# Patient Record
Sex: MALE | Race: WHITE | Employment: OTHER
[De-identification: names, ages, dates, MRNs, and addresses within clinical notes are randomized per-mention and may not be internally consistent; named-entity substitution may affect disease eponyms.]

---

## 2024-04-09 ENCOUNTER — APPOINTMENT (OUTPATIENT)
Facility: HOSPITAL | Age: 69
DRG: 871 | End: 2024-04-09
Payer: MEDICARE

## 2024-04-09 ENCOUNTER — HOSPITAL ENCOUNTER (INPATIENT)
Facility: HOSPITAL | Age: 69
LOS: 6 days | Discharge: NURSING, REHAB OR LONG-TERM CARE | DRG: 871 | End: 2024-04-15
Attending: EMERGENCY MEDICINE | Admitting: HOSPITALIST
Payer: MEDICARE

## 2024-04-09 ENCOUNTER — APPOINTMENT (OUTPATIENT)
Facility: HOSPITAL | Age: 69
DRG: 871 | End: 2024-04-09
Attending: EMERGENCY MEDICINE
Payer: MEDICARE

## 2024-04-09 DIAGNOSIS — J18.9 HAP (HOSPITAL-ACQUIRED PNEUMONIA): Primary | ICD-10-CM
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
## 2024-04-09 DIAGNOSIS — Z86.79 HISTORY OF INTRACRANIAL HEMORRHAGE: ICD-10-CM

## 2024-04-09 PROBLEM — R41.82 ALTERED MENTAL STATUS: Status: ACTIVE | Noted: 2024-04-09

## 2024-04-09 LAB
ALBUMIN SERPL-MCNC: 2.4 G/DL (ref 3.5–5)
ALBUMIN/GLOB SERPL: 0.5 (ref 1.1–2.2)
ALP SERPL-CCNC: 227 U/L (ref 45–117)
ALT SERPL-CCNC: 98 U/L (ref 12–78)
AMMONIA PLAS-SCNC: 48 UMOL/L
ANION GAP SERPL CALC-SCNC: 6 MMOL/L (ref 5–15)
APPEARANCE UR: CLEAR
AST SERPL W P-5'-P-CCNC: 73 U/L (ref 15–37)
BACTERIA URNS QL MICRO: ABNORMAL /HPF
BASE EXCESS BLD CALC-SCNC: 0.1 MMOL/L
BASOPHILS # BLD: 0.2 K/UL (ref 0–0.1)
BASOPHILS NFR BLD: 1 % (ref 0–1)
BILIRUB SERPL-MCNC: 1.8 MG/DL (ref 0.2–1)
BILIRUB UR QL: NEGATIVE
BUN SERPL-MCNC: 39 MG/DL (ref 6–20)
BUN/CREAT SERPL: 28 (ref 12–20)
CA-I BLD-MCNC: 1.26 MMOL/L (ref 1.12–1.32)
CA-I BLD-MCNC: 9.2 MG/DL (ref 8.5–10.1)
CHLORIDE BLD-SCNC: 97 MMOL/L (ref 98–107)
CHLORIDE SERPL-SCNC: 100 MMOL/L (ref 97–108)
CO2 BLD-SCNC: 24 MMOL/L
CO2 SERPL-SCNC: 24 MMOL/L (ref 21–32)
COLOR UR: ABNORMAL
CREAT SERPL-MCNC: 1.37 MG/DL (ref 0.7–1.3)
CREAT UR-MCNC: 1.05 MG/DL (ref 0.6–1.3)
DIFFERENTIAL METHOD BLD: ABNORMAL
EKG ATRIAL RATE: 97 BPM
EKG DIAGNOSIS: NORMAL
EKG P AXIS: 60 DEGREES
EKG P-R INTERVAL: 262 MS
EKG Q-T INTERVAL: 330 MS
EKG QRS DURATION: 98 MS
EKG QTC CALCULATION (BAZETT): 419 MS
EKG R AXIS: 14 DEGREES
EKG T AXIS: 42 DEGREES
EKG VENTRICULAR RATE: 97 BPM
EOSINOPHIL # BLD: 0 K/UL (ref 0–0.4)
EOSINOPHIL NFR BLD: 0 % (ref 0–7)
EPITH CASTS URNS QL MICRO: ABNORMAL /LPF
ERYTHROCYTE [DISTWIDTH] IN BLOOD BY AUTOMATED COUNT: 14 % (ref 11.5–14.5)
FLUAV AG NPH QL IA: NEGATIVE
FLUBV AG NOSE QL IA: NEGATIVE
GLOBULIN SER CALC-MCNC: 4.7 G/DL (ref 2–4)
GLUCOSE BLD STRIP.AUTO-MCNC: 193 MG/DL (ref 65–100)
GLUCOSE BLD STRIP.AUTO-MCNC: 288 MG/DL (ref 65–100)
GLUCOSE SERPL-MCNC: 280 MG/DL (ref 65–100)
GLUCOSE UR STRIP.AUTO-MCNC: >500 MG/DL
HCO3 BLD-SCNC: 24.3 MMOL/L (ref 19–28)
HCT VFR BLD AUTO: 33.6 % (ref 36.6–50.3)
HGB BLD-MCNC: 11.5 G/DL (ref 12.1–17)
HGB UR QL STRIP: ABNORMAL
IMM GRANULOCYTES # BLD AUTO: 0 K/UL
IMM GRANULOCYTES NFR BLD AUTO: 0 %
KETONES UR QL STRIP.AUTO: NEGATIVE MG/DL
LACTATE BLD-SCNC: 1.49 MMOL/L (ref 0.4–2)
LACTATE BLD-SCNC: 2.08 MMOL/L (ref 0.4–2)
LACTATE BLD-SCNC: 2.99 MMOL/L (ref 0.4–2)
LEUKOCYTE ESTERASE UR QL STRIP.AUTO: ABNORMAL
LYMPHOCYTES # BLD: 1.4 K/UL (ref 0.8–3.5)
LYMPHOCYTES NFR BLD: 7 % (ref 12–49)
MCH RBC QN AUTO: 33 PG (ref 26–34)
MCHC RBC AUTO-ENTMCNC: 34.2 G/DL (ref 30–36.5)
MCV RBC AUTO: 96.6 FL (ref 80–99)
METAMYELOCYTES NFR BLD MANUAL: 1 %
MONOCYTES # BLD: 1.4 K/UL (ref 0–1)
MONOCYTES NFR BLD: 7 % (ref 5–13)
NEUTS BAND NFR BLD MANUAL: 7 % (ref 0–6)
NEUTS SEG # BLD: 16.5 K/UL (ref 1.8–8)
NEUTS SEG NFR BLD: 77 % (ref 32–75)
NITRITE UR QL STRIP.AUTO: POSITIVE
NRBC # BLD: 0 K/UL (ref 0–0.01)
NRBC BLD-RTO: 0 PER 100 WBC
PCO2 BLD: 37 MMHG (ref 35–45)
PERFORMED BY:: ABNORMAL
PERFORMED BY:: NORMAL
PH BLD: 7.42 (ref 7.35–7.45)
PH UR STRIP: 5 (ref 5–8)
PLATELET # BLD AUTO: 73 K/UL (ref 150–400)
PLATELET COMMENT: ABNORMAL
PMV BLD AUTO: 12.6 FL (ref 8.9–12.9)
PO2 BLD: 38 MMHG (ref 75–100)
POTASSIUM BLD-SCNC: 4.2 MMOL/L (ref 3.5–5.5)
POTASSIUM SERPL-SCNC: 4 MMOL/L (ref 3.5–5.1)
PROT SERPL-MCNC: 7.1 G/DL (ref 6.4–8.2)
PROT UR STRIP-MCNC: NEGATIVE MG/DL
RBC # BLD AUTO: 3.48 M/UL (ref 4.1–5.7)
RBC #/AREA URNS HPF: ABNORMAL /HPF (ref 0–5)
RBC MORPH BLD: ABNORMAL
SAO2 % BLD: 74 %
SARS-COV-2 RDRP RESP QL NAA+PROBE: NOT DETECTED
SODIUM BLD-SCNC: 132 MMOL/L (ref 136–145)
SODIUM SERPL-SCNC: 130 MMOL/L (ref 136–145)
SP GR UR REFRACTOMETRY: 1.02 (ref 1–1.03)
SPECIMEN SITE: ABNORMAL
TROPONIN I SERPL HS-MCNC: 5 NG/L (ref 0–76)
URINE CULTURE IF INDICATED: ABNORMAL
UROBILINOGEN UR QL STRIP.AUTO: 2 EU/DL (ref 0.1–1)
WBC # BLD AUTO: 19.7 K/UL (ref 4.1–11.1)
WBC URNS QL MICRO: ABNORMAL /HPF (ref 0–4)

## 2024-04-09 PROCEDURE — 87635 SARS-COV-2 COVID-19 AMP PRB: CPT

## 2024-04-09 PROCEDURE — 80053 COMPREHEN METABOLIC PANEL: CPT

## 2024-04-09 PROCEDURE — 6360000002 HC RX W HCPCS: Performed by: EMERGENCY MEDICINE

## 2024-04-09 PROCEDURE — 3E0G76Z INTRODUCTION OF NUTRITIONAL SUBSTANCE INTO UPPER GI, VIA NATURAL OR ARTIFICIAL OPENING: ICD-10-PCS | Performed by: INTERNAL MEDICINE

## 2024-04-09 PROCEDURE — 81001 URINALYSIS AUTO W/SCOPE: CPT

## 2024-04-09 PROCEDURE — 82140 ASSAY OF AMMONIA: CPT

## 2024-04-09 PROCEDURE — 96375 TX/PRO/DX INJ NEW DRUG ADDON: CPT

## 2024-04-09 PROCEDURE — 87086 URINE CULTURE/COLONY COUNT: CPT

## 2024-04-09 PROCEDURE — 87186 SC STD MICRODIL/AGAR DIL: CPT

## 2024-04-09 PROCEDURE — 96366 THER/PROPH/DIAG IV INF ADDON: CPT

## 2024-04-09 PROCEDURE — 82962 GLUCOSE BLOOD TEST: CPT

## 2024-04-09 PROCEDURE — 70450 CT HEAD/BRAIN W/O DYE: CPT

## 2024-04-09 PROCEDURE — 71045 X-RAY EXAM CHEST 1 VIEW: CPT

## 2024-04-09 PROCEDURE — 2580000003 HC RX 258: Performed by: EMERGENCY MEDICINE

## 2024-04-09 PROCEDURE — 99285 EMERGENCY DEPT VISIT HI MDM: CPT

## 2024-04-09 PROCEDURE — 84295 ASSAY OF SERUM SODIUM: CPT

## 2024-04-09 PROCEDURE — 84484 ASSAY OF TROPONIN QUANT: CPT

## 2024-04-09 PROCEDURE — 6360000002 HC RX W HCPCS: Performed by: HOSPITALIST

## 2024-04-09 PROCEDURE — 83605 ASSAY OF LACTIC ACID: CPT

## 2024-04-09 PROCEDURE — 96365 THER/PROPH/DIAG IV INF INIT: CPT

## 2024-04-09 PROCEDURE — 87804 INFLUENZA ASSAY W/OPTIC: CPT

## 2024-04-09 PROCEDURE — 82803 BLOOD GASES ANY COMBINATION: CPT

## 2024-04-09 PROCEDURE — 2000000000 HC ICU R&B

## 2024-04-09 PROCEDURE — 36415 COLL VENOUS BLD VENIPUNCTURE: CPT

## 2024-04-09 PROCEDURE — 85025 COMPLETE CBC W/AUTO DIFF WBC: CPT

## 2024-04-09 PROCEDURE — 2580000003 HC RX 258: Performed by: HOSPITALIST

## 2024-04-09 PROCEDURE — 93005 ELECTROCARDIOGRAM TRACING: CPT | Performed by: EMERGENCY MEDICINE

## 2024-04-09 PROCEDURE — 6370000000 HC RX 637 (ALT 250 FOR IP): Performed by: HOSPITALIST

## 2024-04-09 PROCEDURE — 96367 TX/PROPH/DG ADDL SEQ IV INF: CPT

## 2024-04-09 PROCEDURE — 87040 BLOOD CULTURE FOR BACTERIA: CPT

## 2024-04-09 PROCEDURE — 82330 ASSAY OF CALCIUM: CPT

## 2024-04-09 PROCEDURE — 82947 ASSAY GLUCOSE BLOOD QUANT: CPT

## 2024-04-09 PROCEDURE — 87088 URINE BACTERIA CULTURE: CPT

## 2024-04-09 PROCEDURE — 84132 ASSAY OF SERUM POTASSIUM: CPT

## 2024-04-09 RX ORDER — ALBUTEROL SULFATE 2.5 MG/3ML
2.5 SOLUTION RESPIRATORY (INHALATION) EVERY 8 HOURS
COMMUNITY
Start: 2024-04-03 | End: 2024-05-03

## 2024-04-09 RX ORDER — ALBUTEROL SULFATE 90 UG/1
2 AEROSOL, METERED RESPIRATORY (INHALATION) EVERY 6 HOURS PRN
Status: DISCONTINUED | OUTPATIENT
Start: 2024-04-09 | End: 2024-04-15 | Stop reason: HOSPADM

## 2024-04-09 RX ORDER — AMANTADINE HYDROCHLORIDE 100 MG/1
100 CAPSULE, GELATIN COATED ORAL DAILY
Status: DISCONTINUED | OUTPATIENT
Start: 2024-04-10 | End: 2024-04-13

## 2024-04-09 RX ORDER — BISACODYL 10 MG
10 SUPPOSITORY, RECTAL RECTAL DAILY PRN
Status: DISCONTINUED | OUTPATIENT
Start: 2024-04-09 | End: 2024-04-15 | Stop reason: HOSPADM

## 2024-04-09 RX ORDER — AMANTADINE HYDROCHLORIDE 100 MG/1
100 CAPSULE, GELATIN COATED ORAL DAILY
Status: ON HOLD | COMMUNITY
End: 2024-04-13 | Stop reason: HOSPADM

## 2024-04-09 RX ORDER — POTASSIUM CHLORIDE 7.45 MG/ML
10 INJECTION INTRAVENOUS PRN
Status: DISCONTINUED | OUTPATIENT
Start: 2024-04-09 | End: 2024-04-15 | Stop reason: HOSPADM

## 2024-04-09 RX ORDER — INSULIN LISPRO 100 [IU]/ML
16 INJECTION, SOLUTION INTRAVENOUS; SUBCUTANEOUS EVERY 4 HOURS
COMMUNITY
Start: 2024-04-03 | End: 2025-04-03

## 2024-04-09 RX ORDER — INSULIN GLARGINE 100 [IU]/ML
34 INJECTION, SOLUTION SUBCUTANEOUS NIGHTLY
COMMUNITY
Start: 2024-04-03 | End: 2024-05-03

## 2024-04-09 RX ORDER — PROPRANOLOL HYDROCHLORIDE 10 MG/1
10 TABLET ORAL EVERY 8 HOURS
Status: ON HOLD | COMMUNITY
Start: 2024-04-03 | End: 2024-04-14 | Stop reason: HOSPADM

## 2024-04-09 RX ORDER — DORZOLAMIDE HCL 20 MG/ML
1 SOLUTION/ DROPS OPHTHALMIC 3 TIMES DAILY
Status: DISCONTINUED | OUTPATIENT
Start: 2024-04-09 | End: 2024-04-10

## 2024-04-09 RX ORDER — GLUCAGON 1 MG/ML
1 KIT INJECTION PRN
Status: DISCONTINUED | OUTPATIENT
Start: 2024-04-09 | End: 2024-04-15 | Stop reason: HOSPADM

## 2024-04-09 RX ORDER — INSULIN LISPRO 100 [IU]/ML
0-8 INJECTION, SOLUTION INTRAVENOUS; SUBCUTANEOUS EVERY 4 HOURS
Status: DISCONTINUED | OUTPATIENT
Start: 2024-04-09 | End: 2024-04-11

## 2024-04-09 RX ORDER — GUAIFENESIN 200 MG/1
200 TABLET ORAL EVERY 6 HOURS PRN
Status: ON HOLD | COMMUNITY
Start: 2024-04-03 | End: 2024-04-12 | Stop reason: HOSPADM

## 2024-04-09 RX ORDER — METHYLPHENIDATE HYDROCHLORIDE 10 MG/1
10 TABLET ORAL 2 TIMES DAILY
Status: ON HOLD | COMMUNITY
End: 2024-04-13 | Stop reason: HOSPADM

## 2024-04-09 RX ORDER — SENNOSIDES A AND B 8.6 MG/1
2 TABLET, FILM COATED ORAL NIGHTLY
Status: ON HOLD | COMMUNITY
End: 2024-04-12 | Stop reason: HOSPADM

## 2024-04-09 RX ORDER — HYDROXYZINE HYDROCHLORIDE 25 MG/1
25 TABLET, FILM COATED ORAL EVERY 6 HOURS PRN
Status: DISCONTINUED | OUTPATIENT
Start: 2024-04-09 | End: 2024-04-15 | Stop reason: HOSPADM

## 2024-04-09 RX ORDER — DORZOLAMIDE HYDROCHLORIDE AND TIMOLOL MALEATE 20; 5 MG/ML; MG/ML
1 SOLUTION/ DROPS OPHTHALMIC 2 TIMES DAILY
COMMUNITY

## 2024-04-09 RX ORDER — INSULIN GLARGINE 100 [IU]/ML
34 INJECTION, SOLUTION SUBCUTANEOUS NIGHTLY
Status: DISCONTINUED | OUTPATIENT
Start: 2024-04-09 | End: 2024-04-15 | Stop reason: HOSPADM

## 2024-04-09 RX ORDER — CALCIUM CARBONATE 500(1250)
500 TABLET ORAL 4 TIMES DAILY
COMMUNITY

## 2024-04-09 RX ORDER — AMLODIPINE BESYLATE 10 MG/1
10 TABLET ORAL DAILY
Status: ON HOLD | COMMUNITY
Start: 2024-04-04 | End: 2024-04-12 | Stop reason: HOSPADM

## 2024-04-09 RX ORDER — POLYETHYLENE GLYCOL 3350 17 G/17G
17 POWDER, FOR SOLUTION ORAL DAILY PRN
Status: DISCONTINUED | OUTPATIENT
Start: 2024-04-09 | End: 2024-04-15 | Stop reason: HOSPADM

## 2024-04-09 RX ORDER — LANOLIN ALCOHOL/MO/W.PET/CERES
3 CREAM (GRAM) TOPICAL NIGHTLY PRN
COMMUNITY

## 2024-04-09 RX ORDER — BENZOCAINE/MENTHOL 6 MG-10 MG
LOZENGE MUCOUS MEMBRANE 2 TIMES DAILY
Status: ON HOLD | COMMUNITY
End: 2024-04-12 | Stop reason: HOSPADM

## 2024-04-09 RX ORDER — 0.9 % SODIUM CHLORIDE 0.9 %
1000 INTRAVENOUS SOLUTION INTRAVENOUS ONCE
Status: COMPLETED | OUTPATIENT
Start: 2024-04-09 | End: 2024-04-09

## 2024-04-09 RX ORDER — LACTULOSE 10 G/15ML
20 SOLUTION ORAL DAILY
Status: DISCONTINUED | OUTPATIENT
Start: 2024-04-10 | End: 2024-04-15 | Stop reason: HOSPADM

## 2024-04-09 RX ORDER — SODIUM CHLORIDE 0.9 % (FLUSH) 0.9 %
5-40 SYRINGE (ML) INJECTION PRN
Status: DISCONTINUED | OUTPATIENT
Start: 2024-04-09 | End: 2024-04-15 | Stop reason: HOSPADM

## 2024-04-09 RX ORDER — ONDANSETRON 4 MG/1
4 TABLET, ORALLY DISINTEGRATING ORAL EVERY 6 HOURS
COMMUNITY

## 2024-04-09 RX ORDER — ONDANSETRON 2 MG/ML
4 INJECTION INTRAMUSCULAR; INTRAVENOUS EVERY 6 HOURS PRN
Status: DISCONTINUED | OUTPATIENT
Start: 2024-04-09 | End: 2024-04-15 | Stop reason: HOSPADM

## 2024-04-09 RX ORDER — SIMETHICONE 80 MG
80 TABLET,CHEWABLE ORAL EVERY 6 HOURS PRN
COMMUNITY

## 2024-04-09 RX ORDER — DORZOLAMIDE HYDROCHLORIDE AND TIMOLOL MALEATE 20; 5 MG/ML; MG/ML
1 SOLUTION/ DROPS OPHTHALMIC 2 TIMES DAILY
Status: DISCONTINUED | OUTPATIENT
Start: 2024-04-09 | End: 2024-04-09

## 2024-04-09 RX ORDER — SODIUM CHLORIDE 9 MG/ML
INJECTION, SOLUTION INTRAVENOUS PRN
Status: DISCONTINUED | OUTPATIENT
Start: 2024-04-09 | End: 2024-04-10

## 2024-04-09 RX ORDER — POTASSIUM CHLORIDE 29.8 MG/ML
20 INJECTION INTRAVENOUS PRN
Status: DISCONTINUED | OUTPATIENT
Start: 2024-04-09 | End: 2024-04-15 | Stop reason: HOSPADM

## 2024-04-09 RX ORDER — LATANOPROST 50 UG/ML
1 SOLUTION/ DROPS OPHTHALMIC NIGHTLY
COMMUNITY

## 2024-04-09 RX ORDER — SODIUM CHLORIDE 0.9 % (FLUSH) 0.9 %
5-40 SYRINGE (ML) INJECTION EVERY 12 HOURS SCHEDULED
Status: DISCONTINUED | OUTPATIENT
Start: 2024-04-09 | End: 2024-04-15 | Stop reason: HOSPADM

## 2024-04-09 RX ORDER — METRONIDAZOLE 500 MG/1
500 TABLET ORAL EVERY 12 HOURS
Status: ON HOLD | COMMUNITY
End: 2024-04-12 | Stop reason: HOSPADM

## 2024-04-09 RX ORDER — LEVETIRACETAM 500 MG/5ML
1500 INJECTION, SOLUTION, CONCENTRATE INTRAVENOUS
Status: COMPLETED | OUTPATIENT
Start: 2024-04-09 | End: 2024-04-09

## 2024-04-09 RX ORDER — SIMETHICONE 80 MG
80 TABLET,CHEWABLE ORAL EVERY 6 HOURS PRN
Status: DISCONTINUED | OUTPATIENT
Start: 2024-04-09 | End: 2024-04-15 | Stop reason: HOSPADM

## 2024-04-09 RX ORDER — LANOLIN ALCOHOL/MO/W.PET/CERES
3 CREAM (GRAM) TOPICAL NIGHTLY PRN
Status: DISCONTINUED | OUTPATIENT
Start: 2024-04-09 | End: 2024-04-15 | Stop reason: HOSPADM

## 2024-04-09 RX ORDER — ASPIRIN 81 MG/1
81 TABLET, CHEWABLE ORAL DAILY
Status: ON HOLD | COMMUNITY
Start: 2024-04-04 | End: 2024-04-12

## 2024-04-09 RX ORDER — POLYETHYLENE GLYCOL 3350 17 G/17G
17 POWDER, FOR SOLUTION ORAL DAILY PRN
Status: ON HOLD | COMMUNITY
End: 2024-04-12 | Stop reason: HOSPADM

## 2024-04-09 RX ORDER — MAGNESIUM SULFATE IN WATER 40 MG/ML
2000 INJECTION, SOLUTION INTRAVENOUS PRN
Status: DISCONTINUED | OUTPATIENT
Start: 2024-04-09 | End: 2024-04-15 | Stop reason: HOSPADM

## 2024-04-09 RX ORDER — BISACODYL 10 MG
10 SUPPOSITORY, RECTAL RECTAL DAILY PRN
COMMUNITY

## 2024-04-09 RX ORDER — SENNOSIDES A AND B 8.6 MG/1
2 TABLET, FILM COATED ORAL NIGHTLY
Status: DISCONTINUED | OUTPATIENT
Start: 2024-04-09 | End: 2024-04-13

## 2024-04-09 RX ORDER — HYDROXYZINE HYDROCHLORIDE 25 MG/1
25 TABLET, FILM COATED ORAL EVERY 6 HOURS PRN
COMMUNITY

## 2024-04-09 RX ORDER — METHYLPHENIDATE HYDROCHLORIDE 5 MG/1
10 TABLET ORAL 2 TIMES DAILY
Status: DISCONTINUED | OUTPATIENT
Start: 2024-04-10 | End: 2024-04-13

## 2024-04-09 RX ORDER — NALOXONE HYDROCHLORIDE 0.4 MG/ML
0.4 INJECTION, SOLUTION INTRAMUSCULAR; INTRAVENOUS; SUBCUTANEOUS PRN
COMMUNITY

## 2024-04-09 RX ORDER — LATANOPROST 50 UG/ML
1 SOLUTION/ DROPS OPHTHALMIC NIGHTLY
Status: DISCONTINUED | OUTPATIENT
Start: 2024-04-09 | End: 2024-04-15 | Stop reason: HOSPADM

## 2024-04-09 RX ORDER — ACETAMINOPHEN 325 MG/1
650 TABLET ORAL EVERY 4 HOURS PRN
Status: ON HOLD | COMMUNITY
End: 2024-04-12

## 2024-04-09 RX ORDER — DEXTROSE MONOHYDRATE 100 MG/ML
INJECTION, SOLUTION INTRAVENOUS CONTINUOUS PRN
Status: DISCONTINUED | OUTPATIENT
Start: 2024-04-09 | End: 2024-04-15 | Stop reason: HOSPADM

## 2024-04-09 RX ORDER — TIMOLOL MALEATE 5 MG/ML
1 SOLUTION/ DROPS OPHTHALMIC 2 TIMES DAILY
Status: DISCONTINUED | OUTPATIENT
Start: 2024-04-09 | End: 2024-04-10

## 2024-04-09 RX ORDER — ACETAMINOPHEN 650 MG/1
650 SUPPOSITORY RECTAL EVERY 6 HOURS PRN
Status: DISCONTINUED | OUTPATIENT
Start: 2024-04-09 | End: 2024-04-12

## 2024-04-09 RX ORDER — LISINOPRIL 10 MG/1
10 TABLET ORAL DAILY
COMMUNITY
Start: 2024-02-03 | End: 2024-04-09 | Stop reason: ALTCHOICE

## 2024-04-09 RX ORDER — ONDANSETRON 4 MG/1
4 TABLET, ORALLY DISINTEGRATING ORAL EVERY 8 HOURS PRN
Status: DISCONTINUED | OUTPATIENT
Start: 2024-04-09 | End: 2024-04-15 | Stop reason: HOSPADM

## 2024-04-09 RX ORDER — ALBUTEROL SULFATE 90 UG/1
2 AEROSOL, METERED RESPIRATORY (INHALATION) EVERY 6 HOURS PRN
COMMUNITY

## 2024-04-09 RX ORDER — ACETAMINOPHEN 325 MG/1
650 TABLET ORAL EVERY 6 HOURS PRN
Status: DISCONTINUED | OUTPATIENT
Start: 2024-04-09 | End: 2024-04-12

## 2024-04-09 RX ORDER — HYDRALAZINE HYDROCHLORIDE 25 MG/1
25 TABLET, FILM COATED ORAL EVERY 6 HOURS PRN
COMMUNITY

## 2024-04-09 RX ADMIN — PIPERACILLIN AND TAZOBACTAM 4500 MG: 4; .5 INJECTION, POWDER, LYOPHILIZED, FOR SOLUTION INTRAVENOUS at 13:22

## 2024-04-09 RX ADMIN — SODIUM CHLORIDE, PRESERVATIVE FREE 10 ML: 5 INJECTION INTRAVENOUS at 23:19

## 2024-04-09 RX ADMIN — SODIUM CHLORIDE 1000 ML: 9 INJECTION, SOLUTION INTRAVENOUS at 13:32

## 2024-04-09 RX ADMIN — SODIUM CHLORIDE 1000 ML: 9 INJECTION, SOLUTION INTRAVENOUS at 16:29

## 2024-04-09 RX ADMIN — VANCOMYCIN HYDROCHLORIDE 1500 MG: 750 INJECTION, POWDER, LYOPHILIZED, FOR SOLUTION INTRAVENOUS at 14:03

## 2024-04-09 RX ADMIN — PIPERACILLIN AND TAZOBACTAM 3375 MG: 3; .375 INJECTION, POWDER, LYOPHILIZED, FOR SOLUTION INTRAVENOUS at 23:11

## 2024-04-09 RX ADMIN — LEVETIRACETAM 1500 MG: 100 INJECTION, SOLUTION INTRAVENOUS at 16:30

## 2024-04-09 RX ADMIN — INSULIN GLARGINE 34 UNITS: 100 INJECTION, SOLUTION SUBCUTANEOUS at 23:10

## 2024-04-09 ASSESSMENT — PAIN SCALES - GENERAL
PAINLEVEL_OUTOF10: 0

## 2024-04-09 ASSESSMENT — PAIN - FUNCTIONAL ASSESSMENT: PAIN_FUNCTIONAL_ASSESSMENT: NONE - DENIES PAIN

## 2024-04-09 ASSESSMENT — LIFESTYLE VARIABLES
HOW MANY STANDARD DRINKS CONTAINING ALCOHOL DO YOU HAVE ON A TYPICAL DAY: PATIENT DOES NOT DRINK
HOW OFTEN DO YOU HAVE A DRINK CONTAINING ALCOHOL: NEVER

## 2024-04-09 NOTE — CONSULTS
Hospitalist Admission Note    NAME: Florentino Dalal   :  1955   MRN:  [id number]     Date/Time:  2024 6:11 PM    Patient PCP: No primary care provider on file.    ______________________________________________________________________  Given the patient's current clinical presentation in regards to the patient's multiple medical problems, complex decision making was performed, which includes reviewing the patient's available past medical records, laboratory results, and diagnostic studies.       My assessment of this patient's clinical condition and my plan of care is as follows.    Assessment / Plan:    68-year-old male with a large stroke burden with new acute bleeding episodes and ongoing evolution of the stroke with new hypoattenuation consistent with ongoing stroke burden.  New neurological changes are consistent with evolving stroke.  Considering the amount of midline shift and effacement of the right ventricle even though slightly improved from previous CT this facility does not provide onsite neurology nor neurosurgery and the family has elected for the patient to be transferred for ongoing care.  They have elected to be transferred back to [facility] and [facility] as a second option.  There is not a significant pneumonia burden to be consistent with this being driven by sepsis although may be contributing.  Would recommend continuing vancomycin and Zosyn for now. Have also discussed comfort care and what the patient would want and the family is considering comfort care. Iif that is the case the patient would be appropriate for admission to this facility.  At this point they have not made that decision and again the recommendation is for a transfer to a higher level of care.  Would also recommend no further heparin treatment due to the thrombocytopenia from the cirrhosis with new acute hemorrhagic stroke.  Have discussed the above plan with the ER physician, Dr. Springer and Dr. Andres.  Would

## 2024-04-09 NOTE — ED PROVIDER NOTES
[facility] EMERGENCY DEPT  EMERGENCY DEPARTMENT HISTORY AND PHYSICAL EXAM      Date: 4/9/2024  Patient Name: Florentino Dalal  MRN: [id number]  Birthdate 1955  Date of evaluation: 4/9/2024  Provider: Trupti Pozo MD   Note Started: 3:27 PM EDT 4/9/24    HISTORY OF PRESENT ILLNESS     Chief Complaint   Patient presents with    Altered Mental Status       History Provided By: Patient    HPI: Florentino Dalal is a 68 y.o. male past medical history of right MCA stroke with hemorrhagic conversion after thrombectomy presents for evaluation of confusion.  Patient was diagnosed with a pneumonia earlier today and started on antibiotics.  Becoming progressively more confused is brought to the emergency department.  Here patient is requiring oxygen which he does not typically need.    PAST MEDICAL HISTORY   Past Medical History:  Past Medical History:   Diagnosis Date    CVA (cerebral vascular accident) (HCC)     Left-sided weakness     Subdural hematoma (HCC)        Past Surgical History:  No past surgical history on file.    Family History:  No family history on file.    Social History:  Social History     Tobacco Use    Smoking status: Never    Smokeless tobacco: Never   Vaping Use    Vaping Use: Never used   Substance Use Topics    Alcohol use: Never    Drug use: Never       Allergies:  Allergies   Allergen Reactions    Statins Myalgia    Levetiracetam Other (See Comments)     agitation    Sulfa Antibiotics Hives       PCP: No primary care provider on file.    Current Meds:   Current Facility-Administered Medications   Medication Dose Route Frequency Provider Last Rate Last Admin    lisinopril (PRINIVIL;ZESTRIL) tablet 10 mg  10 mg Oral Daily Jan Velazquez PA-C   10 mg at 04/15/24 0913    ibuprofen (ADVIL;MOTRIN) 100 MG/5ML suspension 200 mg  200 mg Per NG tube Q6H PRN Jan Velazquez PA-C   200 mg at 04/13/24 0923    acetaminophen (TYLENOL) 160 MG/5ML solution 650 mg  650 mg Per G Tube Q12H PRN Jan Velazquez

## 2024-04-09 NOTE — ED TRIAGE NOTES
Per EMS, pt transferrred to [facility] after hemoorgaic CVA from [facility], Sunday pt becamse altered. Chest XR was done at [facility], showd infiltrates in left lower lung. Pt was 90-92% RA, placed on 2L NC, came up to 98%.

## 2024-04-10 ENCOUNTER — APPOINTMENT (OUTPATIENT)
Facility: HOSPITAL | Age: 69
DRG: 871 | End: 2024-04-10
Payer: MEDICARE

## 2024-04-10 LAB
ALBUMIN SERPL-MCNC: 2.2 G/DL (ref 3.5–5)
ALBUMIN/GLOB SERPL: 0.6 (ref 1.1–2.2)
ALP SERPL-CCNC: 202 U/L (ref 45–117)
ALT SERPL-CCNC: 91 U/L (ref 12–78)
ANION GAP SERPL CALC-SCNC: 6 MMOL/L (ref 5–15)
AST SERPL W P-5'-P-CCNC: 64 U/L (ref 15–37)
BASOPHILS # BLD: 0 K/UL (ref 0–0.1)
BASOPHILS NFR BLD: 0 % (ref 0–1)
BILIRUB SERPL-MCNC: 1.7 MG/DL (ref 0.2–1)
BUN SERPL-MCNC: 26 MG/DL (ref 6–20)
BUN/CREAT SERPL: 27 (ref 12–20)
CA-I BLD-MCNC: 8.2 MG/DL (ref 8.5–10.1)
CHLORIDE SERPL-SCNC: 105 MMOL/L (ref 97–108)
CO2 SERPL-SCNC: 24 MMOL/L (ref 21–32)
CREAT SERPL-MCNC: 0.98 MG/DL (ref 0.7–1.3)
DIFFERENTIAL METHOD BLD: ABNORMAL
EOSINOPHIL # BLD: 0.1 K/UL (ref 0–0.4)
EOSINOPHIL NFR BLD: 1 % (ref 0–7)
ERYTHROCYTE [DISTWIDTH] IN BLOOD BY AUTOMATED COUNT: 13.4 % (ref 11.5–14.5)
EST. AVERAGE GLUCOSE BLD GHB EST-MCNC: 186 MG/DL
GLOBULIN SER CALC-MCNC: 3.6 G/DL (ref 2–4)
GLUCOSE BLD STRIP.AUTO-MCNC: 168 MG/DL (ref 65–100)
GLUCOSE BLD STRIP.AUTO-MCNC: 179 MG/DL (ref 65–100)
GLUCOSE BLD STRIP.AUTO-MCNC: 237 MG/DL (ref 65–100)
GLUCOSE BLD STRIP.AUTO-MCNC: 250 MG/DL (ref 65–100)
GLUCOSE SERPL-MCNC: 153 MG/DL (ref 65–100)
HBA1C MFR BLD: 8.1 % (ref 4–5.6)
HCT VFR BLD AUTO: 30.4 % (ref 36.6–50.3)
HGB BLD-MCNC: 10.6 G/DL (ref 12.1–17)
IMM GRANULOCYTES # BLD AUTO: 0.1 K/UL (ref 0–0.04)
IMM GRANULOCYTES NFR BLD AUTO: 1 % (ref 0–0.5)
LYMPHOCYTES # BLD: 1.1 K/UL (ref 0.8–3.5)
LYMPHOCYTES NFR BLD: 8 % (ref 12–49)
MCH RBC QN AUTO: 33.3 PG (ref 26–34)
MCHC RBC AUTO-ENTMCNC: 34.9 G/DL (ref 30–36.5)
MCV RBC AUTO: 95.6 FL (ref 80–99)
MONOCYTES # BLD: 0.7 K/UL (ref 0–1)
MONOCYTES NFR BLD: 6 % (ref 5–13)
NEUTS SEG # BLD: 11.1 K/UL (ref 1.8–8)
NEUTS SEG NFR BLD: 84 % (ref 32–75)
NRBC # BLD: 0 K/UL (ref 0–0.01)
NRBC BLD-RTO: 0 PER 100 WBC
PERFORMED BY:: ABNORMAL
PHOSPHATE SERPL-MCNC: 2.5 MG/DL (ref 2.6–4.7)
PLATELET # BLD AUTO: 60 K/UL (ref 150–400)
PMV BLD AUTO: 11.6 FL (ref 8.9–12.9)
POTASSIUM SERPL-SCNC: 3.6 MMOL/L (ref 3.5–5.1)
PROT SERPL-MCNC: 5.8 G/DL (ref 6.4–8.2)
RBC # BLD AUTO: 3.18 M/UL (ref 4.1–5.7)
SODIUM SERPL-SCNC: 135 MMOL/L (ref 136–145)
WBC # BLD AUTO: 13.2 K/UL (ref 4.1–11.1)

## 2024-04-10 PROCEDURE — 83036 HEMOGLOBIN GLYCOSYLATED A1C: CPT

## 2024-04-10 PROCEDURE — 92526 ORAL FUNCTION THERAPY: CPT

## 2024-04-10 PROCEDURE — 2000000000 HC ICU R&B

## 2024-04-10 PROCEDURE — 43762 RPLC GTUBE NO REVJ TRC: CPT

## 2024-04-10 PROCEDURE — 6360000002 HC RX W HCPCS: Performed by: PHYSICIAN ASSISTANT

## 2024-04-10 PROCEDURE — 80053 COMPREHEN METABOLIC PANEL: CPT

## 2024-04-10 PROCEDURE — 6360000002 HC RX W HCPCS: Performed by: HOSPITALIST

## 2024-04-10 PROCEDURE — 2580000003 HC RX 258: Performed by: HOSPITALIST

## 2024-04-10 PROCEDURE — 92610 EVALUATE SWALLOWING FUNCTION: CPT

## 2024-04-10 PROCEDURE — 84100 ASSAY OF PHOSPHORUS: CPT

## 2024-04-10 PROCEDURE — 71045 X-RAY EXAM CHEST 1 VIEW: CPT

## 2024-04-10 PROCEDURE — 82962 GLUCOSE BLOOD TEST: CPT

## 2024-04-10 PROCEDURE — 36415 COLL VENOUS BLD VENIPUNCTURE: CPT

## 2024-04-10 PROCEDURE — 85025 COMPLETE CBC W/AUTO DIFF WBC: CPT

## 2024-04-10 PROCEDURE — 6370000000 HC RX 637 (ALT 250 FOR IP): Performed by: HOSPITALIST

## 2024-04-10 RX ORDER — POLYETHYLENE GLYCOL 3350 17 G/17G
17 POWDER, FOR SOLUTION ORAL DAILY PRN
Status: DISCONTINUED | OUTPATIENT
Start: 2024-04-10 | End: 2024-04-10

## 2024-04-10 RX ORDER — SODIUM CHLORIDE 0.9 % (FLUSH) 0.9 %
5-40 SYRINGE (ML) INJECTION EVERY 12 HOURS SCHEDULED
Status: DISCONTINUED | OUTPATIENT
Start: 2024-04-10 | End: 2024-04-10

## 2024-04-10 RX ORDER — DORZOLAMIDE HCL 20 MG/ML
1 SOLUTION/ DROPS OPHTHALMIC DAILY
Status: DISCONTINUED | OUTPATIENT
Start: 2024-04-11 | End: 2024-04-15 | Stop reason: HOSPADM

## 2024-04-10 RX ORDER — SODIUM CHLORIDE 0.9 % (FLUSH) 0.9 %
5-40 SYRINGE (ML) INJECTION PRN
Status: DISCONTINUED | OUTPATIENT
Start: 2024-04-10 | End: 2024-04-10

## 2024-04-10 RX ORDER — SODIUM CHLORIDE 9 MG/ML
INJECTION, SOLUTION INTRAVENOUS PRN
Status: DISCONTINUED | OUTPATIENT
Start: 2024-04-10 | End: 2024-04-15 | Stop reason: HOSPADM

## 2024-04-10 RX ORDER — LEVETIRACETAM 500 MG/5ML
1000 INJECTION, SOLUTION, CONCENTRATE INTRAVENOUS EVERY 12 HOURS
Status: DISCONTINUED | OUTPATIENT
Start: 2024-04-10 | End: 2024-04-11

## 2024-04-10 RX ORDER — ONDANSETRON 4 MG/1
4 TABLET, ORALLY DISINTEGRATING ORAL EVERY 8 HOURS PRN
Status: DISCONTINUED | OUTPATIENT
Start: 2024-04-10 | End: 2024-04-10

## 2024-04-10 RX ORDER — TIMOLOL MALEATE 5 MG/ML
1 SOLUTION/ DROPS OPHTHALMIC DAILY
Status: DISCONTINUED | OUTPATIENT
Start: 2024-04-11 | End: 2024-04-15 | Stop reason: HOSPADM

## 2024-04-10 RX ORDER — ONDANSETRON 2 MG/ML
4 INJECTION INTRAMUSCULAR; INTRAVENOUS EVERY 6 HOURS PRN
Status: DISCONTINUED | OUTPATIENT
Start: 2024-04-10 | End: 2024-04-10

## 2024-04-10 RX ADMIN — LEVETIRACETAM 1000 MG: 100 INJECTION, SOLUTION INTRAVENOUS at 10:18

## 2024-04-10 RX ADMIN — ACETAMINOPHEN 650 MG: 325 TABLET ORAL at 19:00

## 2024-04-10 RX ADMIN — INSULIN LISPRO 4 UNITS: 100 INJECTION, SOLUTION INTRAVENOUS; SUBCUTANEOUS at 18:23

## 2024-04-10 RX ADMIN — INSULIN GLARGINE 34 UNITS: 100 INJECTION, SOLUTION SUBCUTANEOUS at 20:35

## 2024-04-10 RX ADMIN — PIPERACILLIN AND TAZOBACTAM 3375 MG: 3; .375 INJECTION, POWDER, LYOPHILIZED, FOR SOLUTION INTRAVENOUS at 06:34

## 2024-04-10 RX ADMIN — LEVETIRACETAM 1000 MG: 100 INJECTION, SOLUTION INTRAVENOUS at 20:36

## 2024-04-10 RX ADMIN — VANCOMYCIN HYDROCHLORIDE 1750 MG: 750 INJECTION, POWDER, LYOPHILIZED, FOR SOLUTION INTRAVENOUS at 17:14

## 2024-04-10 RX ADMIN — PIPERACILLIN AND TAZOBACTAM 3375 MG: 3; .375 INJECTION, POWDER, LYOPHILIZED, FOR SOLUTION INTRAVENOUS at 21:53

## 2024-04-10 RX ADMIN — ACETAMINOPHEN 650 MG: 325 TABLET ORAL at 12:55

## 2024-04-10 RX ADMIN — SODIUM CHLORIDE, PRESERVATIVE FREE 10 ML: 5 INJECTION INTRAVENOUS at 10:18

## 2024-04-10 RX ADMIN — INSULIN LISPRO 2 UNITS: 100 INJECTION, SOLUTION INTRAVENOUS; SUBCUTANEOUS at 20:35

## 2024-04-10 RX ADMIN — HYDROXYZINE HYDROCHLORIDE 25 MG: 25 TABLET, FILM COATED ORAL at 19:00

## 2024-04-10 RX ADMIN — LATANOPROST 1 DROP: 50 SOLUTION OPHTHALMIC at 21:38

## 2024-04-10 RX ADMIN — SODIUM CHLORIDE, PRESERVATIVE FREE 10 ML: 5 INJECTION INTRAVENOUS at 20:38

## 2024-04-10 RX ADMIN — DORZOLAMIDE HYDROCHLORIDE 1 DROP: 20 SOLUTION/ DROPS OPHTHALMIC at 15:38

## 2024-04-10 RX ADMIN — DORZOLAMIDE HYDROCHLORIDE 1 DROP: 20 SOLUTION/ DROPS OPHTHALMIC at 20:36

## 2024-04-10 RX ADMIN — DORZOLAMIDE HYDROCHLORIDE 1 DROP: 20 SOLUTION/ DROPS OPHTHALMIC at 10:20

## 2024-04-10 RX ADMIN — PIPERACILLIN AND TAZOBACTAM 3375 MG: 3; .375 INJECTION, POWDER, LYOPHILIZED, FOR SOLUTION INTRAVENOUS at 15:37

## 2024-04-10 ASSESSMENT — PAIN SCALES - GENERAL
PAINLEVEL_OUTOF10: 0

## 2024-04-10 ASSESSMENT — PAIN DESCRIPTION - LOCATION: LOCATION: BACK

## 2024-04-10 NOTE — H&P
°F (36.8 °C) Oral 92 19 98 % -- --   24 2000 111/65 98.1 °F (36.7 °C) Oral 92 20 93 % -- --   24 1900 (!) 109/57 98.4 °F (36.9 °C) Oral 93 19 96 % -- --   24 1747 131/79 -- -- 99 19 94 % -- --   24 1727 133/79 -- -- 98 19 95 % -- --   24 1717 132/81 -- -- 97 20 96 % -- --   24 1708 -- -- -- -- -- 93 % -- --   24 1658 129/81 -- -- 96 17 -- -- --   24 1643 131/76 -- -- 97 18 -- -- --   24 1430 120/70 -- -- 95 19 -- -- --   24 1415 116/77 -- -- 94 19 -- -- --   24 1400 126/69 -- -- 94 18 -- -- --   24 1330 123/77 -- -- 91 19 -- -- --   24 1315 109/67 -- -- 95 21 -- -- --   24 1301 105/67 98.5 °F (36.9 °C) Oral 94 20 92 % 1.905 m (6' 3\") 117.9 kg (260 lb)       Temp (24hrs), Av.4 °F (36.9 °C), Min:98 °F (36.7 °C), Max:98.6 °F (37 °C)           Wt Readings from Last 12 Encounters:   24 117.9 kg (260 lb)       Review of Systems   Unable to perform ROS: Acuity of condition        PHYSICAL EXAM:  Physical Exam  Vitals reviewed.   Eyes:      Extraocular Movements: Extraocular movements intact.      Conjunctiva/sclera: Conjunctivae normal.   Cardiovascular:      Rate and Rhythm: Normal rate and regular rhythm.      Heart sounds: Normal heart sounds.   Pulmonary:      Effort: Pulmonary effort is normal.      Breath sounds: Normal breath sounds.   Abdominal:      General: Abdomen is flat. Bowel sounds are normal.      Palpations: Abdomen is soft.   Musculoskeletal:      Cervical back: Normal range of motion and neck supple.      Comments:  Left sided hemiparesis although sensation is present  Right arm and leg with passive motion especially to painful stimuli     Skin:     General: Skin is warm and dry.   Neurological:      Mental Status: He is alert. He is disoriented.      Comments: Left facial droop  Slurred speech               LAB DATA REVIEWED:    Recent Results (from the past 12 hour(s))   Comprehensive Metabolic Panel w/ Reflex

## 2024-04-10 NOTE — CARE COORDINATION
04/10/24 1250   Service Assessment   Patient Orientation Unable to Assess   Cognition Other (see comment)  (UNable to assess)   History Provided By Spouse   Primary Caregiver Family   Support Systems Spouse/Significant Other;Children;Family Members;Friends/Neighbors   Patient's Healthcare Decision Maker is: Legal Next of Kin   PCP Verified by CM Yes  (Dr. Larry Johnson)   Last Visit to PCP Within last 3 months   Prior Functional Level Assistance with the following:;Bathing;Dressing;Toileting;Feeding   Current Functional Level Assistance with the following:;Bathing;Dressing;Toileting;Feeding   Can patient return to prior living arrangement Unknown at present   Ability to make needs known: Unable   Family able to assist with home care needs: Yes   Would you like for me to discuss the discharge plan with any other family members/significant others, and if so, who? Yes   Financial Resources None   Community Resources None   Social/Functional History   Lives With Spouse     CM met f/f with Pt wife, she confirmed that the information on the face sheet is correct. Pt wife stated that Pt was at [facility], she stated that he went there last Wednesday.    No HH, no DME.    Send RX to [facility] on [address]    PCP: Dr. Larry Johnson    Transport: TBD    Pt wife stated that she is hoping Pt can get up and walk again.     CM dispo: TBD    Advance Care Planning     General Advance Care Planning (ACP) Conversation    Date of Conversation: 4/10/2024  Conducted with:     Healthcare Decision Maker:    Primary Decision Maker: Zhane Dalal - Spouse - [phone]  Click here to complete Healthcare Decision Makers including selection of the Healthcare Decision Maker Relationship (ie \"Primary\").       Content/Action Overview:    Reviewed DNR/DNI and patient elects Full Code (Attempt Resuscitation)

## 2024-04-10 NOTE — CONSULTS
Comprehensive Nutrition Assessment    Type and Reason for Visit:  Consult (TF)    Nutrition Recommendations/Plan:   Rec diet per SLP  Consider TF:  TwoCal (2.0 calorie) via PEG at goal of 237mL bolus 5x/day  Flush 250mL H2O s/p each bolus or per team  Provides 2370kcal (98%), 99g protein (100%), 2080mL H2O (90%)  Please obtain updated measured weight as able  Monitor and record TF rate, flushes, and Bms in I/Os     Malnutrition Assessment:  Malnutrition Status:  No malnutrition (04/10/24 1058)    Context:  Acute Illness     Findings of the 6 clinical characteristics of malnutrition:  Energy Intake:  No significant decrease in energy intake  Weight Loss:  Unable to assess (estimated weight available)     Body Fat Loss:  No significant body fat loss     Muscle Mass Loss:  No significant muscle mass loss    Fluid Accumulation:  No significant fluid accumulation     Strength:  Not Performed    Nutrition Assessment:    Admitted for AMS. S/p recent CVA with PEG placed 3/31/24 per chart review - hx twocal HN 237mL 4x/day f/b 100mL H2O flush. UTO weight hx - last measured weight 92.7kg at OSH, only estimated weight available s/p admit. Rec's above. Labs: Na 135, K 3.6, BUN 26, Creat 0.98, Gluc 168. Meds: insulin glargine, insulin lispro    Nutrition Related Findings:    NFPE without acute findings. No n/v, d/c, hx dysphagia +PEG. No edema. BM PTA. Wound Type: None       Current Nutrition Intake & Therapies:    Average Meal Intake: Unable to assess  Average Supplements Intake: None Ordered  ADULT DIET; Dysphagia - Pureed; Mildly Thick (Nectar)    Anthropometric Measures:  Height: 190.5 cm (6' 3\")  Ideal Body Weight (IBW): 196 lbs (89 kg)    Current Body Weight: 117.9 kg (259 lb 14.8 oz), 132.6 % IBW. Weight Source: Other (Comment) (estimated, last measured weight 92.7kg 3/16/24 OSH)  Current BMI (kg/m2): 32.5  BMI Categories: Obese Class 1 (BMI 30.0-34.9)    Estimated Daily Nutrient Needs:  Energy Requirements Based On:

## 2024-04-10 NOTE — ED NOTES
TRANSFER - OUT REPORT:    Verbal report given to NELLIE Pierson on Florentino Dalal  being transferred to ICU     Report consisted of patient's Situation, Background, Assessment and   Recommendations(SBAR).     Information from the following report(s) ED Encounter Summary, ED SBAR, MAR, Recent Results, and Neuro Assessment was reviewed with the receiving nurse.    [place] Fall Assessment:    Presents to emergency department  because of falls (Syncope, seizure, or loss of consciousness): No  Age > 70: Yes  Altered Mental Status, Intoxication with alcohol or substance confusion (Disorientation, impaired judgment, poor safety awaremess, or inability to follow instructions): Yes  Impaired Mobility: Ambulates or transfers with assistive devices or assistance; Unable to ambulate or transer.: Yes  Nursing Judgement: Yes          Lines:   Peripheral IV 04/09/24 Distal;Right Forearm (Active)       Peripheral IV 04/09/24 Left;Posterior Hand (Active)        Opportunity for questions and clarification was provided.      Patient transported with:  Monitor, O2 @ 2lpm, Registered Nurse, and Tech

## 2024-04-11 ENCOUNTER — APPOINTMENT (OUTPATIENT)
Facility: HOSPITAL | Age: 69
DRG: 871 | End: 2024-04-11
Payer: MEDICARE

## 2024-04-11 LAB
ALBUMIN SERPL-MCNC: 2.2 G/DL (ref 3.5–5)
ALBUMIN/GLOB SERPL: 0.6 (ref 1.1–2.2)
ALP SERPL-CCNC: 275 U/L (ref 45–117)
ALT SERPL-CCNC: 152 U/L (ref 12–78)
AMMONIA PLAS-SCNC: 41 UMOL/L
ANION GAP SERPL CALC-SCNC: 6 MMOL/L (ref 5–15)
AST SERPL W P-5'-P-CCNC: 138 U/L (ref 15–37)
BASOPHILS # BLD: 0.1 K/UL (ref 0–0.1)
BASOPHILS NFR BLD: 1 % (ref 0–1)
BILIRUB SERPL-MCNC: 1.2 MG/DL (ref 0.2–1)
BUN SERPL-MCNC: 22 MG/DL (ref 6–20)
BUN/CREAT SERPL: 30 (ref 12–20)
CA-I BLD-MCNC: 8.3 MG/DL (ref 8.5–10.1)
CHLORIDE SERPL-SCNC: 109 MMOL/L (ref 97–108)
CHOLEST SERPL-MCNC: 120 MG/DL
CO2 SERPL-SCNC: 22 MMOL/L (ref 21–32)
CREAT SERPL-MCNC: 0.74 MG/DL (ref 0.7–1.3)
DATE LAST DOSE: NORMAL
DIFFERENTIAL METHOD BLD: ABNORMAL
DOSE AMOUNT: NORMAL UNITS
EOSINOPHIL # BLD: 0.2 K/UL (ref 0–0.4)
EOSINOPHIL NFR BLD: 3 % (ref 0–7)
ERYTHROCYTE [DISTWIDTH] IN BLOOD BY AUTOMATED COUNT: 13.1 % (ref 11.5–14.5)
EST. AVERAGE GLUCOSE BLD GHB EST-MCNC: 180 MG/DL
GLOBULIN SER CALC-MCNC: 3.9 G/DL (ref 2–4)
GLUCOSE BLD STRIP.AUTO-MCNC: 136 MG/DL (ref 65–100)
GLUCOSE BLD STRIP.AUTO-MCNC: 213 MG/DL (ref 65–100)
GLUCOSE BLD STRIP.AUTO-MCNC: 242 MG/DL (ref 65–100)
GLUCOSE BLD STRIP.AUTO-MCNC: 250 MG/DL (ref 65–100)
GLUCOSE BLD STRIP.AUTO-MCNC: 264 MG/DL (ref 65–100)
GLUCOSE BLD STRIP.AUTO-MCNC: 291 MG/DL (ref 65–100)
GLUCOSE SERPL-MCNC: 133 MG/DL (ref 65–100)
HBA1C MFR BLD: 7.9 % (ref 4–5.6)
HCT VFR BLD AUTO: 34.2 % (ref 36.6–50.3)
HDLC SERPL-MCNC: 35 MG/DL
HDLC SERPL: 3.4 (ref 0–5)
HGB BLD-MCNC: 11.4 G/DL (ref 12.1–17)
IMM GRANULOCYTES # BLD AUTO: 0.2 K/UL (ref 0–0.04)
IMM GRANULOCYTES NFR BLD AUTO: 2 % (ref 0–0.5)
LDLC SERPL CALC-MCNC: 73.4 MG/DL (ref 0–100)
LIPID PANEL: NORMAL
LYMPHOCYTES # BLD: 0.8 K/UL (ref 0.8–3.5)
LYMPHOCYTES NFR BLD: 11 % (ref 12–49)
MCH RBC QN AUTO: 32.5 PG (ref 26–34)
MCHC RBC AUTO-ENTMCNC: 33.3 G/DL (ref 30–36.5)
MCV RBC AUTO: 97.4 FL (ref 80–99)
MONOCYTES # BLD: 0.6 K/UL (ref 0–1)
MONOCYTES NFR BLD: 8 % (ref 5–13)
NEUTS SEG # BLD: 5.7 K/UL (ref 1.8–8)
NEUTS SEG NFR BLD: 75 % (ref 32–75)
NRBC # BLD: 0 K/UL (ref 0–0.01)
NRBC BLD-RTO: 0 PER 100 WBC
PERFORMED BY:: ABNORMAL
PLATELET # BLD AUTO: 68 K/UL (ref 150–400)
PMV BLD AUTO: 10.9 FL (ref 8.9–12.9)
POTASSIUM SERPL-SCNC: 3.6 MMOL/L (ref 3.5–5.1)
PROT SERPL-MCNC: 6.1 G/DL (ref 6.4–8.2)
RBC # BLD AUTO: 3.51 M/UL (ref 4.1–5.7)
RBC MORPH BLD: ABNORMAL
SODIUM SERPL-SCNC: 137 MMOL/L (ref 136–145)
TRIGL SERPL-MCNC: 58 MG/DL
VANCOMYCIN SERPL-MCNC: 8.9 UG/ML
VLDLC SERPL CALC-MCNC: 11.6 MG/DL
WBC # BLD AUTO: 7.6 K/UL (ref 4.1–11.1)

## 2024-04-11 PROCEDURE — 6370000000 HC RX 637 (ALT 250 FOR IP): Performed by: PHYSICIAN ASSISTANT

## 2024-04-11 PROCEDURE — 70450 CT HEAD/BRAIN W/O DYE: CPT

## 2024-04-11 PROCEDURE — 2580000003 HC RX 258: Performed by: HOSPITALIST

## 2024-04-11 PROCEDURE — G0425 INPT/ED TELECONSULT30: HCPCS | Performed by: PSYCHIATRY & NEUROLOGY

## 2024-04-11 PROCEDURE — 6360000002 HC RX W HCPCS: Performed by: PHYSICIAN ASSISTANT

## 2024-04-11 PROCEDURE — 82962 GLUCOSE BLOOD TEST: CPT

## 2024-04-11 PROCEDURE — 6370000000 HC RX 637 (ALT 250 FOR IP): Performed by: INTERNAL MEDICINE

## 2024-04-11 PROCEDURE — 6360000002 HC RX W HCPCS: Performed by: HOSPITALIST

## 2024-04-11 PROCEDURE — 83036 HEMOGLOBIN GLYCOSYLATED A1C: CPT

## 2024-04-11 PROCEDURE — 82140 ASSAY OF AMMONIA: CPT

## 2024-04-11 PROCEDURE — 80202 ASSAY OF VANCOMYCIN: CPT

## 2024-04-11 PROCEDURE — 80053 COMPREHEN METABOLIC PANEL: CPT

## 2024-04-11 PROCEDURE — 6370000000 HC RX 637 (ALT 250 FOR IP): Performed by: HOSPITALIST

## 2024-04-11 PROCEDURE — 80061 LIPID PANEL: CPT

## 2024-04-11 PROCEDURE — 36415 COLL VENOUS BLD VENIPUNCTURE: CPT

## 2024-04-11 PROCEDURE — 85025 COMPLETE CBC W/AUTO DIFF WBC: CPT

## 2024-04-11 PROCEDURE — 2000000000 HC ICU R&B

## 2024-04-11 RX ORDER — DEXTROSE MONOHYDRATE 100 MG/ML
INJECTION, SOLUTION INTRAVENOUS CONTINUOUS PRN
Status: DISCONTINUED | OUTPATIENT
Start: 2024-04-11 | End: 2024-04-11

## 2024-04-11 RX ORDER — GLUCAGON 1 MG/ML
1 KIT INJECTION PRN
Status: DISCONTINUED | OUTPATIENT
Start: 2024-04-11 | End: 2024-04-11

## 2024-04-11 RX ORDER — INSULIN LISPRO 100 [IU]/ML
0-4 INJECTION, SOLUTION INTRAVENOUS; SUBCUTANEOUS NIGHTLY
Status: DISCONTINUED | OUTPATIENT
Start: 2024-04-11 | End: 2024-04-15 | Stop reason: HOSPADM

## 2024-04-11 RX ORDER — INSULIN LISPRO 100 [IU]/ML
0-16 INJECTION, SOLUTION INTRAVENOUS; SUBCUTANEOUS
Status: DISCONTINUED | OUTPATIENT
Start: 2024-04-11 | End: 2024-04-15 | Stop reason: HOSPADM

## 2024-04-11 RX ADMIN — AMANTADINE HYDROCHLORIDE 100 MG: 100 CAPSULE ORAL at 12:16

## 2024-04-11 RX ADMIN — INSULIN LISPRO 4 UNITS: 100 INJECTION, SOLUTION INTRAVENOUS; SUBCUTANEOUS at 08:25

## 2024-04-11 RX ADMIN — PIPERACILLIN AND TAZOBACTAM 3375 MG: 3; .375 INJECTION, POWDER, LYOPHILIZED, FOR SOLUTION INTRAVENOUS at 15:52

## 2024-04-11 RX ADMIN — METHYLPHENIDATE HYDROCHLORIDE 10 MG: 5 TABLET ORAL at 21:00

## 2024-04-11 RX ADMIN — INSULIN LISPRO 4 UNITS: 100 INJECTION, SOLUTION INTRAVENOUS; SUBCUTANEOUS at 12:17

## 2024-04-11 RX ADMIN — TIMOLOL MALEATE 1 DROP: 5 SOLUTION/ DROPS OPHTHALMIC at 08:24

## 2024-04-11 RX ADMIN — DORZOLAMIDE HCL 1 DROP: 20 SOLUTION/ DROPS OPHTHALMIC at 08:24

## 2024-04-11 RX ADMIN — INSULIN LISPRO 8 UNITS: 100 INJECTION, SOLUTION INTRAVENOUS; SUBCUTANEOUS at 18:01

## 2024-04-11 RX ADMIN — LATANOPROST 1 DROP: 50 SOLUTION OPHTHALMIC at 21:00

## 2024-04-11 RX ADMIN — PIPERACILLIN AND TAZOBACTAM 3375 MG: 3; .375 INJECTION, POWDER, LYOPHILIZED, FOR SOLUTION INTRAVENOUS at 08:21

## 2024-04-11 RX ADMIN — PIPERACILLIN AND TAZOBACTAM 3375 MG: 3; .375 INJECTION, POWDER, LYOPHILIZED, FOR SOLUTION INTRAVENOUS at 23:00

## 2024-04-11 RX ADMIN — SENNOSIDES 17.2 MG: 8.6 TABLET, FILM COATED ORAL at 21:00

## 2024-04-11 RX ADMIN — LACTULOSE 20 G: 20 SOLUTION ORAL at 08:24

## 2024-04-11 RX ADMIN — INSULIN GLARGINE 34 UNITS: 100 INJECTION, SOLUTION SUBCUTANEOUS at 21:00

## 2024-04-11 RX ADMIN — METHYLPHENIDATE HYDROCHLORIDE 10 MG: 5 TABLET ORAL at 08:23

## 2024-04-11 RX ADMIN — INSULIN LISPRO 2 UNITS: 100 INJECTION, SOLUTION INTRAVENOUS; SUBCUTANEOUS at 00:50

## 2024-04-11 RX ADMIN — SODIUM CHLORIDE, PRESERVATIVE FREE 10 ML: 5 INJECTION INTRAVENOUS at 08:23

## 2024-04-11 RX ADMIN — SODIUM CHLORIDE, PRESERVATIVE FREE 10 ML: 5 INJECTION INTRAVENOUS at 21:00

## 2024-04-11 RX ADMIN — LEVETIRACETAM 1000 MG: 100 INJECTION, SOLUTION INTRAVENOUS at 08:23

## 2024-04-11 RX ADMIN — ACETAMINOPHEN 650 MG: 325 TABLET ORAL at 04:38

## 2024-04-11 RX ADMIN — ACETAMINOPHEN 650 MG: 325 TABLET ORAL at 18:01

## 2024-04-11 ASSESSMENT — PAIN SCALES - GENERAL
PAINLEVEL_OUTOF10: 0

## 2024-04-11 ASSESSMENT — PAIN DESCRIPTION - LOCATION
LOCATION: HEAD
LOCATION: HEAD

## 2024-04-11 ASSESSMENT — PAIN SCALES - WONG BAKER: WONGBAKER_NUMERICALRESPONSE: NO HURT

## 2024-04-11 ASSESSMENT — PAIN DESCRIPTION - DESCRIPTORS: DESCRIPTORS: ACHING

## 2024-04-11 NOTE — CONSULTS
[facility] NEUROLOGICAL CONSULTATION   Sushma Manda, MD; Neurohospitalist   Date of Service 4/11/2024       REASON Altered mental status [R41.82]    ASSESSMENT  Transient encephalopathy- metabolic, sepsis, UTI,PNA, dehydration, VERA, hyperammonemia  Currently at his baseline per wife  Clinical presentation not c/w seizures    Hx R MCA CVA, GINA dissection, s/p mechanical thrombectomy, followed by hemorrhagic transformation, mid line shift, managed conservatively; residual L facial droop, L hemiparesis. Clinically stable.  Last CT head 4/9- Large area of heterogeneous hypoattenuation in the right cerebral hemisphere  with edema and 5 mm midline shift to the left. There is central and peripheral  hyperdensity which may represent petechial hemorrhages. Small amount of acute subarachnoid blood in the left parieto-occipital lobe and small amount of acute subdural blood along the right tentorium.Hypoattenuation in the medial right temporal lobe and right occipital lobe  may represent intra-axial lesions versus acute ischemic injuries.     No prior images to compare with    Dysphagia ; PEG+- recent bleeding at PEG site- prompted pausing aspirin and heparin SQ per wife  one week ago; No active bleeding was reported per RN    DM-II A1c 10.6  Hyperlipidemia     PLAN  Hold antiplatelet agents and anticoagulants   CT head pending- to assist with clearance for aspirin +/- heparin SQ    Dc keppra- wife reported intolerance in the past-agitation and no seizures were reported  No strong indication for EEG at this time    Hydrate, optimize treatment of UTI, PNA and hyperammonemia - per prim team    Unable to do statin due to cirrhosis  Risk factor modification  Keep SBP<150. Monitor coags  MRI brain- unlikely to change overall management  PT  Consider discussion of goals of care- given multiple comorbidities  D/w wife  D/w RN  D/w Linus Springer MD Via perfect serve    HISTORY OF PRESENT ILLNESS  67 y/o M was admitted  Detail Level: Zone Detail Level: Generalized

## 2024-04-11 NOTE — CARE COORDINATION
CM reviewed Pt medicals, CM is following Pt for D/C needs.     11:45  Met f/f with Pt wife, she asked CM to send a referral to [facility].    Pt wife signed the choice letter for [facility], will send a referral, will place choice letter on Pt chart.

## 2024-04-12 ENCOUNTER — APPOINTMENT (OUTPATIENT)
Facility: HOSPITAL | Age: 69
DRG: 871 | End: 2024-04-12
Payer: MEDICARE

## 2024-04-12 LAB
ALBUMIN SERPL-MCNC: 2 G/DL (ref 3.5–5)
ALBUMIN/GLOB SERPL: 0.5 (ref 1.1–2.2)
ALP SERPL-CCNC: 292 U/L (ref 45–117)
ALT SERPL-CCNC: 216 U/L (ref 12–78)
AMMONIA PLAS-SCNC: 54 UMOL/L
ANION GAP SERPL CALC-SCNC: 6 MMOL/L (ref 5–15)
AST SERPL W P-5'-P-CCNC: 156 U/L (ref 15–37)
BACTERIA SPEC CULT: ABNORMAL
BACTERIA SPEC CULT: ABNORMAL
BASOPHILS # BLD: 0 K/UL (ref 0–0.1)
BASOPHILS NFR BLD: 0 % (ref 0–1)
BILIRUB SERPL-MCNC: 1 MG/DL (ref 0.2–1)
BUN SERPL-MCNC: 19 MG/DL (ref 6–20)
BUN/CREAT SERPL: 24 (ref 12–20)
CA-I BLD-MCNC: 8.5 MG/DL (ref 8.5–10.1)
CHLORIDE SERPL-SCNC: 107 MMOL/L (ref 97–108)
CO2 SERPL-SCNC: 22 MMOL/L (ref 21–32)
COLONY COUNT, CNT: ABNORMAL
CREAT SERPL-MCNC: 0.78 MG/DL (ref 0.7–1.3)
DIFFERENTIAL METHOD BLD: ABNORMAL
EOSINOPHIL # BLD: 0.1 K/UL (ref 0–0.4)
EOSINOPHIL NFR BLD: 1 % (ref 0–7)
ERYTHROCYTE [DISTWIDTH] IN BLOOD BY AUTOMATED COUNT: 13.2 % (ref 11.5–14.5)
GLOBULIN SER CALC-MCNC: 4.1 G/DL (ref 2–4)
GLUCOSE BLD STRIP.AUTO-MCNC: 121 MG/DL (ref 65–100)
GLUCOSE BLD STRIP.AUTO-MCNC: 205 MG/DL (ref 65–100)
GLUCOSE BLD STRIP.AUTO-MCNC: 214 MG/DL (ref 65–100)
GLUCOSE BLD STRIP.AUTO-MCNC: 240 MG/DL (ref 65–100)
GLUCOSE BLD STRIP.AUTO-MCNC: 267 MG/DL (ref 65–100)
GLUCOSE BLD STRIP.AUTO-MCNC: 305 MG/DL (ref 65–100)
GLUCOSE SERPL-MCNC: 301 MG/DL (ref 65–100)
HCT VFR BLD AUTO: 31 % (ref 36.6–50.3)
HGB BLD-MCNC: 10.7 G/DL (ref 12.1–17)
IMM GRANULOCYTES # BLD AUTO: 0 K/UL
IMM GRANULOCYTES NFR BLD AUTO: 0 %
LYMPHOCYTES # BLD: 1.2 K/UL (ref 0.8–3.5)
LYMPHOCYTES NFR BLD: 20 % (ref 12–49)
Lab: ABNORMAL
MCH RBC QN AUTO: 32.4 PG (ref 26–34)
MCHC RBC AUTO-ENTMCNC: 34.5 G/DL (ref 30–36.5)
MCV RBC AUTO: 93.9 FL (ref 80–99)
METAMYELOCYTES NFR BLD MANUAL: 2 %
MONOCYTES # BLD: 0.6 K/UL (ref 0–1)
MONOCYTES NFR BLD: 9 % (ref 5–13)
MYELOCYTES NFR BLD MANUAL: 3 %
NEUTS BAND NFR BLD MANUAL: 5 % (ref 0–6)
NEUTS SEG # BLD: 4 K/UL (ref 1.8–8)
NEUTS SEG NFR BLD: 60 % (ref 32–75)
NRBC # BLD: 0 K/UL (ref 0–0.01)
NRBC BLD-RTO: 0 PER 100 WBC
PERFORMED BY:: ABNORMAL
PLATELET # BLD AUTO: 72 K/UL (ref 150–400)
PMV BLD AUTO: 10.5 FL (ref 8.9–12.9)
POTASSIUM SERPL-SCNC: 3.8 MMOL/L (ref 3.5–5.1)
PROT SERPL-MCNC: 6.1 G/DL (ref 6.4–8.2)
RBC # BLD AUTO: 3.3 M/UL (ref 4.1–5.7)
RBC MORPH BLD: ABNORMAL
SODIUM SERPL-SCNC: 135 MMOL/L (ref 136–145)
WBC # BLD AUTO: 6.2 K/UL (ref 4.1–11.1)

## 2024-04-12 PROCEDURE — 85025 COMPLETE CBC W/AUTO DIFF WBC: CPT

## 2024-04-12 PROCEDURE — 97161 PT EVAL LOW COMPLEX 20 MIN: CPT

## 2024-04-12 PROCEDURE — 97530 THERAPEUTIC ACTIVITIES: CPT

## 2024-04-12 PROCEDURE — 97166 OT EVAL MOD COMPLEX 45 MIN: CPT

## 2024-04-12 PROCEDURE — 6370000000 HC RX 637 (ALT 250 FOR IP): Performed by: PHYSICIAN ASSISTANT

## 2024-04-12 PROCEDURE — 1100000000 HC RM PRIVATE

## 2024-04-12 PROCEDURE — 82962 GLUCOSE BLOOD TEST: CPT

## 2024-04-12 PROCEDURE — 80053 COMPREHEN METABOLIC PANEL: CPT

## 2024-04-12 PROCEDURE — 36415 COLL VENOUS BLD VENIPUNCTURE: CPT

## 2024-04-12 PROCEDURE — 76705 ECHO EXAM OF ABDOMEN: CPT

## 2024-04-12 PROCEDURE — 6360000002 HC RX W HCPCS: Performed by: HOSPITALIST

## 2024-04-12 PROCEDURE — 82140 ASSAY OF AMMONIA: CPT

## 2024-04-12 PROCEDURE — 6370000000 HC RX 637 (ALT 250 FOR IP): Performed by: HOSPITALIST

## 2024-04-12 PROCEDURE — 2580000003 HC RX 258: Performed by: HOSPITALIST

## 2024-04-12 RX ORDER — ACETAMINOPHEN 650 MG/1
650 SUPPOSITORY RECTAL EVERY 6 HOURS PRN
Status: DISCONTINUED | OUTPATIENT
Start: 2024-04-12 | End: 2024-04-15 | Stop reason: HOSPADM

## 2024-04-12 RX ORDER — FLUCONAZOLE 100 MG/1
200 TABLET ORAL DAILY
Status: DISCONTINUED | OUTPATIENT
Start: 2024-04-12 | End: 2024-04-15 | Stop reason: HOSPADM

## 2024-04-12 RX ORDER — ACETAMINOPHEN 325 MG/1
650 TABLET ORAL EVERY 12 HOURS PRN
Qty: 120 TABLET | Refills: 3 | Status: SHIPPED | OUTPATIENT
Start: 2024-04-12

## 2024-04-12 RX ORDER — ASPIRIN 81 MG/1
81 TABLET, CHEWABLE ORAL DAILY
Qty: 30 TABLET | Refills: 0 | Status: SHIPPED | OUTPATIENT
Start: 2024-04-26 | End: 2024-05-26

## 2024-04-12 RX ORDER — LEVOFLOXACIN 250 MG/1
750 TABLET, FILM COATED ORAL DAILY
Status: DISCONTINUED | OUTPATIENT
Start: 2024-04-12 | End: 2024-04-12

## 2024-04-12 RX ORDER — ACETAMINOPHEN 160 MG/5ML
650 LIQUID ORAL EVERY 12 HOURS PRN
Status: DISCONTINUED | OUTPATIENT
Start: 2024-04-12 | End: 2024-04-15 | Stop reason: HOSPADM

## 2024-04-12 RX ADMIN — ACETAMINOPHEN 650 MG: 325 TABLET ORAL at 01:00

## 2024-04-12 RX ADMIN — FLUCONAZOLE 200 MG: 100 TABLET ORAL at 14:06

## 2024-04-12 RX ADMIN — INSULIN LISPRO 12 UNITS: 100 INJECTION, SOLUTION INTRAVENOUS; SUBCUTANEOUS at 12:18

## 2024-04-12 RX ADMIN — INSULIN LISPRO 4 UNITS: 100 INJECTION, SOLUTION INTRAVENOUS; SUBCUTANEOUS at 08:15

## 2024-04-12 RX ADMIN — SODIUM CHLORIDE, PRESERVATIVE FREE 10 ML: 5 INJECTION INTRAVENOUS at 20:29

## 2024-04-12 RX ADMIN — NYSTATIN 500000 UNITS: 100000 SUSPENSION ORAL at 20:45

## 2024-04-12 RX ADMIN — INSULIN LISPRO 4 UNITS: 100 INJECTION, SOLUTION INTRAVENOUS; SUBCUTANEOUS at 17:09

## 2024-04-12 RX ADMIN — TIMOLOL MALEATE 1 DROP: 5 SOLUTION/ DROPS OPHTHALMIC at 08:19

## 2024-04-12 RX ADMIN — IBUPROFEN 200 MG: 100 SUSPENSION ORAL at 12:19

## 2024-04-12 RX ADMIN — SODIUM CHLORIDE, PRESERVATIVE FREE 10 ML: 5 INJECTION INTRAVENOUS at 08:17

## 2024-04-12 RX ADMIN — LACTULOSE 20 G: 20 SOLUTION ORAL at 08:16

## 2024-04-12 RX ADMIN — AMANTADINE HYDROCHLORIDE 100 MG: 100 CAPSULE ORAL at 08:16

## 2024-04-12 RX ADMIN — NYSTATIN 500000 UNITS: 100000 SUSPENSION ORAL at 14:06

## 2024-04-12 RX ADMIN — LATANOPROST 1 DROP: 50 SOLUTION OPHTHALMIC at 20:28

## 2024-04-12 RX ADMIN — DORZOLAMIDE HCL 1 DROP: 20 SOLUTION/ DROPS OPHTHALMIC at 08:17

## 2024-04-12 RX ADMIN — NYSTATIN 500000 UNITS: 100000 SUSPENSION ORAL at 17:10

## 2024-04-12 RX ADMIN — METHYLPHENIDATE HYDROCHLORIDE 10 MG: 5 TABLET ORAL at 08:16

## 2024-04-12 RX ADMIN — PIPERACILLIN AND TAZOBACTAM 3375 MG: 3; .375 INJECTION, POWDER, LYOPHILIZED, FOR SOLUTION INTRAVENOUS at 06:30

## 2024-04-12 ASSESSMENT — PAIN SCALES - GENERAL
PAINLEVEL_OUTOF10: 4
PAINLEVEL_OUTOF10: 0

## 2024-04-12 ASSESSMENT — PAIN DESCRIPTION - LOCATION
LOCATION: HIP
LOCATION: HEAD

## 2024-04-12 ASSESSMENT — PAIN DESCRIPTION - ORIENTATION: ORIENTATION: RIGHT

## 2024-04-12 ASSESSMENT — PAIN - FUNCTIONAL ASSESSMENT: PAIN_FUNCTIONAL_ASSESSMENT: ACTIVITIES ARE NOT PREVENTED

## 2024-04-12 ASSESSMENT — PAIN SCALES - WONG BAKER: WONGBAKER_NUMERICALRESPONSE: NO HURT

## 2024-04-12 ASSESSMENT — PAIN DESCRIPTION - DESCRIPTORS
DESCRIPTORS: ACHING
DESCRIPTORS: ACHING

## 2024-04-12 NOTE — CARE COORDINATION
CM reviewed Pt medicals, CM sent a referral to [facility] at the request of Pt wife.    Will need PT/OT eval so [facility] can assess Pt.     [facility] will have to get auth if they accept Pt.

## 2024-04-13 LAB
ALBUMIN SERPL-MCNC: 2.5 G/DL (ref 3.5–5)
ALBUMIN/GLOB SERPL: 0.6 (ref 1.1–2.2)
ALP SERPL-CCNC: 355 U/L (ref 45–117)
ALT SERPL-CCNC: 216 U/L (ref 12–78)
AMMONIA PLAS-SCNC: 40 UMOL/L
ANION GAP SERPL CALC-SCNC: 6 MMOL/L (ref 5–15)
AST SERPL W P-5'-P-CCNC: 117 U/L (ref 15–37)
BASOPHILS # BLD: 0 K/UL (ref 0–0.1)
BASOPHILS NFR BLD: 0 % (ref 0–1)
BILIRUB SERPL-MCNC: 1.3 MG/DL (ref 0.2–1)
BUN SERPL-MCNC: 16 MG/DL (ref 6–20)
BUN/CREAT SERPL: 21 (ref 12–20)
CA-I BLD-MCNC: 8.8 MG/DL (ref 8.5–10.1)
CHLORIDE SERPL-SCNC: 109 MMOL/L (ref 97–108)
CO2 SERPL-SCNC: 22 MMOL/L (ref 21–32)
CREAT SERPL-MCNC: 0.78 MG/DL (ref 0.7–1.3)
DIFFERENTIAL METHOD BLD: ABNORMAL
EOSINOPHIL # BLD: 0.1 K/UL (ref 0–0.4)
EOSINOPHIL NFR BLD: 1 % (ref 0–7)
ERYTHROCYTE [DISTWIDTH] IN BLOOD BY AUTOMATED COUNT: 13.4 % (ref 11.5–14.5)
GLOBULIN SER CALC-MCNC: 4 G/DL (ref 2–4)
GLUCOSE BLD STRIP.AUTO-MCNC: 156 MG/DL (ref 65–100)
GLUCOSE BLD STRIP.AUTO-MCNC: 157 MG/DL (ref 65–100)
GLUCOSE BLD STRIP.AUTO-MCNC: 183 MG/DL (ref 65–100)
GLUCOSE BLD STRIP.AUTO-MCNC: 194 MG/DL (ref 65–100)
GLUCOSE BLD STRIP.AUTO-MCNC: 260 MG/DL (ref 65–100)
GLUCOSE BLD STRIP.AUTO-MCNC: 282 MG/DL (ref 65–100)
GLUCOSE BLD STRIP.AUTO-MCNC: 293 MG/DL (ref 65–100)
GLUCOSE SERPL-MCNC: 178 MG/DL (ref 65–100)
HCT VFR BLD AUTO: 34.8 % (ref 36.6–50.3)
HGB BLD-MCNC: 12 G/DL (ref 12.1–17)
IMM GRANULOCYTES # BLD AUTO: 0 K/UL
IMM GRANULOCYTES NFR BLD AUTO: 0 %
LYMPHOCYTES # BLD: 0.9 K/UL (ref 0.8–3.5)
LYMPHOCYTES NFR BLD: 12 % (ref 12–49)
MCH RBC QN AUTO: 32.5 PG (ref 26–34)
MCHC RBC AUTO-ENTMCNC: 34.5 G/DL (ref 30–36.5)
MCV RBC AUTO: 94.3 FL (ref 80–99)
METAMYELOCYTES NFR BLD MANUAL: 1 %
MONOCYTES # BLD: 0.5 K/UL (ref 0–1)
MONOCYTES NFR BLD: 6 % (ref 5–13)
MYELOCYTES NFR BLD MANUAL: 4 %
NEUTS BAND NFR BLD MANUAL: 2 % (ref 0–6)
NEUTS SEG # BLD: 5.9 K/UL (ref 1.8–8)
NEUTS SEG NFR BLD: 74 % (ref 32–75)
NRBC # BLD: 0 K/UL (ref 0–0.01)
NRBC BLD-RTO: 0 PER 100 WBC
PERFORMED BY:: ABNORMAL
PLATELET # BLD AUTO: 84 K/UL (ref 150–400)
PMV BLD AUTO: 10.5 FL (ref 8.9–12.9)
POTASSIUM SERPL-SCNC: 4 MMOL/L (ref 3.5–5.1)
PROT SERPL-MCNC: 6.5 G/DL (ref 6.4–8.2)
RBC # BLD AUTO: 3.69 M/UL (ref 4.1–5.7)
RBC MORPH BLD: ABNORMAL
SODIUM SERPL-SCNC: 137 MMOL/L (ref 136–145)
WBC # BLD AUTO: 7.8 K/UL (ref 4.1–11.1)

## 2024-04-13 PROCEDURE — 97530 THERAPEUTIC ACTIVITIES: CPT

## 2024-04-13 PROCEDURE — 82140 ASSAY OF AMMONIA: CPT

## 2024-04-13 PROCEDURE — 36415 COLL VENOUS BLD VENIPUNCTURE: CPT

## 2024-04-13 PROCEDURE — 1100000000 HC RM PRIVATE

## 2024-04-13 PROCEDURE — 82962 GLUCOSE BLOOD TEST: CPT

## 2024-04-13 PROCEDURE — 6370000000 HC RX 637 (ALT 250 FOR IP): Performed by: HOSPITALIST

## 2024-04-13 PROCEDURE — 6370000000 HC RX 637 (ALT 250 FOR IP): Performed by: PHYSICIAN ASSISTANT

## 2024-04-13 PROCEDURE — 85025 COMPLETE CBC W/AUTO DIFF WBC: CPT

## 2024-04-13 PROCEDURE — G0407 INPT/TELE FOLLOW UP 25: HCPCS | Performed by: PSYCHIATRY & NEUROLOGY

## 2024-04-13 PROCEDURE — 2580000003 HC RX 258: Performed by: HOSPITALIST

## 2024-04-13 PROCEDURE — 80053 COMPREHEN METABOLIC PANEL: CPT

## 2024-04-13 RX ORDER — FLUCONAZOLE 200 MG/1
200 TABLET ORAL DAILY
Qty: 7 TABLET | Refills: 0 | Status: SHIPPED | OUTPATIENT
Start: 2024-04-14 | End: 2024-04-21

## 2024-04-13 RX ORDER — LEVOFLOXACIN 750 MG/1
750 TABLET, FILM COATED ORAL DAILY
Qty: 5 TABLET | Refills: 0 | Status: SHIPPED | OUTPATIENT
Start: 2024-04-14 | End: 2024-04-19

## 2024-04-13 RX ADMIN — IBUPROFEN 200 MG: 100 SUSPENSION ORAL at 09:23

## 2024-04-13 RX ADMIN — INSULIN LISPRO 8 UNITS: 100 INJECTION, SOLUTION INTRAVENOUS; SUBCUTANEOUS at 12:40

## 2024-04-13 RX ADMIN — LEVOFLOXACIN 750 MG: 500 TABLET, FILM COATED ORAL at 08:48

## 2024-04-13 RX ADMIN — NYSTATIN 500000 UNITS: 100000 SUSPENSION ORAL at 21:41

## 2024-04-13 RX ADMIN — TIMOLOL MALEATE 1 DROP: 5 SOLUTION/ DROPS OPHTHALMIC at 08:57

## 2024-04-13 RX ADMIN — SODIUM CHLORIDE, PRESERVATIVE FREE 10 ML: 5 INJECTION INTRAVENOUS at 21:41

## 2024-04-13 RX ADMIN — NYSTATIN 500000 UNITS: 100000 SUSPENSION ORAL at 17:54

## 2024-04-13 RX ADMIN — INSULIN GLARGINE 34 UNITS: 100 INJECTION, SOLUTION SUBCUTANEOUS at 21:45

## 2024-04-13 RX ADMIN — DORZOLAMIDE HCL 1 DROP: 20 SOLUTION/ DROPS OPHTHALMIC at 08:57

## 2024-04-13 RX ADMIN — FLUCONAZOLE 200 MG: 100 TABLET ORAL at 08:55

## 2024-04-13 RX ADMIN — SODIUM CHLORIDE, PRESERVATIVE FREE 10 ML: 5 INJECTION INTRAVENOUS at 08:57

## 2024-04-13 RX ADMIN — NYSTATIN 500000 UNITS: 100000 SUSPENSION ORAL at 12:33

## 2024-04-13 RX ADMIN — NYSTATIN 500000 UNITS: 100000 SUSPENSION ORAL at 08:56

## 2024-04-13 RX ADMIN — LACTULOSE 20 G: 20 SOLUTION ORAL at 08:56

## 2024-04-13 RX ADMIN — LATANOPROST 1 DROP: 50 SOLUTION OPHTHALMIC at 21:41

## 2024-04-13 ASSESSMENT — PAIN SCALES - WONG BAKER
WONGBAKER_NUMERICALRESPONSE: NO HURT

## 2024-04-13 ASSESSMENT — PAIN SCALES - GENERAL
PAINLEVEL_OUTOF10: 0
PAINLEVEL_OUTOF10: 4
PAINLEVEL_OUTOF10: 0
PAINLEVEL_OUTOF10: 2

## 2024-04-13 ASSESSMENT — PAIN DESCRIPTION - DESCRIPTORS: DESCRIPTORS: ACHING

## 2024-04-13 NOTE — CARE COORDINATION
CM noted discharge order.  Patient is accepted  and auth has been started at [facility]  Reference number is [id number]. CM will continue to follow.

## 2024-04-14 LAB
ALBUMIN SERPL-MCNC: 2.3 G/DL (ref 3.5–5)
ALBUMIN/GLOB SERPL: 0.5 (ref 1.1–2.2)
ALP SERPL-CCNC: 384 U/L (ref 45–117)
ALT SERPL-CCNC: 198 U/L (ref 12–78)
ANION GAP SERPL CALC-SCNC: 8 MMOL/L (ref 5–15)
AST SERPL W P-5'-P-CCNC: 93 U/L (ref 15–37)
BASOPHILS # BLD: 0.1 K/UL (ref 0–0.1)
BASOPHILS NFR BLD: 1 % (ref 0–1)
BILIRUB SERPL-MCNC: 0.9 MG/DL (ref 0.2–1)
BUN SERPL-MCNC: 20 MG/DL (ref 6–20)
BUN/CREAT SERPL: 20 (ref 12–20)
CA-I BLD-MCNC: 8.8 MG/DL (ref 8.5–10.1)
CHLORIDE SERPL-SCNC: 105 MMOL/L (ref 97–108)
CO2 SERPL-SCNC: 21 MMOL/L (ref 21–32)
CREAT SERPL-MCNC: 0.98 MG/DL (ref 0.7–1.3)
DIFFERENTIAL METHOD BLD: ABNORMAL
EOSINOPHIL # BLD: 0.2 K/UL (ref 0–0.4)
EOSINOPHIL NFR BLD: 4 % (ref 0–7)
ERYTHROCYTE [DISTWIDTH] IN BLOOD BY AUTOMATED COUNT: 13.9 % (ref 11.5–14.5)
GLOBULIN SER CALC-MCNC: 4.7 G/DL (ref 2–4)
GLUCOSE BLD STRIP.AUTO-MCNC: 181 MG/DL (ref 65–100)
GLUCOSE BLD STRIP.AUTO-MCNC: 184 MG/DL (ref 65–100)
GLUCOSE BLD STRIP.AUTO-MCNC: 194 MG/DL (ref 65–100)
GLUCOSE BLD STRIP.AUTO-MCNC: 264 MG/DL (ref 65–100)
GLUCOSE BLD STRIP.AUTO-MCNC: 276 MG/DL (ref 65–100)
GLUCOSE BLD STRIP.AUTO-MCNC: 309 MG/DL (ref 65–100)
GLUCOSE SERPL-MCNC: 377 MG/DL (ref 65–100)
HCT VFR BLD AUTO: 34 % (ref 36.6–50.3)
HGB BLD-MCNC: 11.9 G/DL (ref 12.1–17)
IMM GRANULOCYTES # BLD AUTO: 0.7 K/UL (ref 0–0.04)
IMM GRANULOCYTES NFR BLD AUTO: 11 % (ref 0–0.5)
LYMPHOCYTES # BLD: 1 K/UL (ref 0.8–3.5)
LYMPHOCYTES NFR BLD: 15 % (ref 12–49)
MCH RBC QN AUTO: 33.3 PG (ref 26–34)
MCHC RBC AUTO-ENTMCNC: 35 G/DL (ref 30–36.5)
MCV RBC AUTO: 95.2 FL (ref 80–99)
MONOCYTES # BLD: 0.5 K/UL (ref 0–1)
MONOCYTES NFR BLD: 7 % (ref 5–13)
NEUTS SEG # BLD: 4.1 K/UL (ref 1.8–8)
NEUTS SEG NFR BLD: 62 % (ref 32–75)
NRBC # BLD: 0 K/UL (ref 0–0.01)
NRBC BLD-RTO: 0 PER 100 WBC
PERFORMED BY:: ABNORMAL
PLATELET # BLD AUTO: 87 K/UL (ref 150–400)
PMV BLD AUTO: 10.7 FL (ref 8.9–12.9)
POTASSIUM SERPL-SCNC: 4.2 MMOL/L (ref 3.5–5.1)
PROT SERPL-MCNC: 7 G/DL (ref 6.4–8.2)
RBC # BLD AUTO: 3.57 M/UL (ref 4.1–5.7)
SODIUM SERPL-SCNC: 134 MMOL/L (ref 136–145)
WBC # BLD AUTO: 6.5 K/UL (ref 4.1–11.1)

## 2024-04-14 PROCEDURE — 82962 GLUCOSE BLOOD TEST: CPT

## 2024-04-14 PROCEDURE — 2580000003 HC RX 258: Performed by: HOSPITALIST

## 2024-04-14 PROCEDURE — 80053 COMPREHEN METABOLIC PANEL: CPT

## 2024-04-14 PROCEDURE — 6370000000 HC RX 637 (ALT 250 FOR IP): Performed by: HOSPITALIST

## 2024-04-14 PROCEDURE — 36415 COLL VENOUS BLD VENIPUNCTURE: CPT

## 2024-04-14 PROCEDURE — 85025 COMPLETE CBC W/AUTO DIFF WBC: CPT

## 2024-04-14 PROCEDURE — 97530 THERAPEUTIC ACTIVITIES: CPT

## 2024-04-14 PROCEDURE — 6370000000 HC RX 637 (ALT 250 FOR IP): Performed by: PHYSICIAN ASSISTANT

## 2024-04-14 PROCEDURE — 1100000000 HC RM PRIVATE

## 2024-04-14 RX ORDER — LISINOPRIL 10 MG/1
10 TABLET ORAL DAILY
Status: DISCONTINUED | OUTPATIENT
Start: 2024-04-14 | End: 2024-04-15 | Stop reason: HOSPADM

## 2024-04-14 RX ORDER — LISINOPRIL 10 MG/1
10 TABLET ORAL DAILY
Qty: 30 TABLET | Refills: 3 | Status: SHIPPED | OUTPATIENT
Start: 2024-04-14

## 2024-04-14 RX ADMIN — INSULIN LISPRO 12 UNITS: 100 INJECTION, SOLUTION INTRAVENOUS; SUBCUTANEOUS at 09:13

## 2024-04-14 RX ADMIN — TIMOLOL MALEATE 1 DROP: 5 SOLUTION/ DROPS OPHTHALMIC at 08:31

## 2024-04-14 RX ADMIN — NYSTATIN 500000 UNITS: 100000 SUSPENSION ORAL at 17:48

## 2024-04-14 RX ADMIN — NYSTATIN 500000 UNITS: 100000 SUSPENSION ORAL at 13:35

## 2024-04-14 RX ADMIN — DORZOLAMIDE HCL 1 DROP: 20 SOLUTION/ DROPS OPHTHALMIC at 08:31

## 2024-04-14 RX ADMIN — LATANOPROST 1 DROP: 50 SOLUTION OPHTHALMIC at 21:46

## 2024-04-14 RX ADMIN — LACTULOSE 20 G: 20 SOLUTION ORAL at 08:30

## 2024-04-14 RX ADMIN — INSULIN LISPRO 8 UNITS: 100 INJECTION, SOLUTION INTRAVENOUS; SUBCUTANEOUS at 13:35

## 2024-04-14 RX ADMIN — SODIUM CHLORIDE, PRESERVATIVE FREE 10 ML: 5 INJECTION INTRAVENOUS at 21:46

## 2024-04-14 RX ADMIN — LISINOPRIL 10 MG: 10 TABLET ORAL at 10:22

## 2024-04-14 RX ADMIN — FLUCONAZOLE 200 MG: 100 TABLET ORAL at 08:30

## 2024-04-14 RX ADMIN — NYSTATIN 500000 UNITS: 100000 SUSPENSION ORAL at 08:30

## 2024-04-14 RX ADMIN — SODIUM CHLORIDE, PRESERVATIVE FREE 10 ML: 5 INJECTION INTRAVENOUS at 08:32

## 2024-04-14 RX ADMIN — INSULIN GLARGINE 34 UNITS: 100 INJECTION, SOLUTION SUBCUTANEOUS at 21:45

## 2024-04-14 RX ADMIN — NYSTATIN 500000 UNITS: 100000 SUSPENSION ORAL at 21:46

## 2024-04-14 RX ADMIN — LEVOFLOXACIN 750 MG: 500 TABLET, FILM COATED ORAL at 08:31

## 2024-04-14 ASSESSMENT — PAIN SCALES - WONG BAKER
WONGBAKER_NUMERICALRESPONSE: NO HURT

## 2024-04-14 ASSESSMENT — PAIN SCALES - GENERAL
PAINLEVEL_OUTOF10: 0

## 2024-04-14 NOTE — CARE COORDINATION
Received call from pt insurance rep@16:17 on Sat 04/13/2024.  Peer to Peer offered for IRF.  Must call before 12 noon on Monday 04/15/2024.  3   opt#5

## 2024-04-15 ENCOUNTER — APPOINTMENT (OUTPATIENT)
Facility: HOSPITAL | Age: 69
DRG: 871 | End: 2024-04-15
Payer: MEDICARE

## 2024-04-15 VITALS
RESPIRATION RATE: 17 BRPM | OXYGEN SATURATION: 97 % | HEART RATE: 86 BPM | SYSTOLIC BLOOD PRESSURE: 119 MMHG | HEIGHT: 75 IN | DIASTOLIC BLOOD PRESSURE: 83 MMHG | BODY MASS INDEX: 32.33 KG/M2 | WEIGHT: 260 LBS | TEMPERATURE: 97.2 F

## 2024-04-15 LAB
ALBUMIN SERPL-MCNC: 2.6 G/DL (ref 3.5–5)
ALBUMIN/GLOB SERPL: 0.5 (ref 1.1–2.2)
ALP SERPL-CCNC: 375 U/L (ref 45–117)
ALT SERPL-CCNC: 172 U/L (ref 12–78)
ANION GAP SERPL CALC-SCNC: 9 MMOL/L (ref 5–15)
AST SERPL W P-5'-P-CCNC: 69 U/L (ref 15–37)
BACTERIA SPEC CULT: NORMAL
BACTERIA SPEC CULT: NORMAL
BASOPHILS # BLD: 0 K/UL (ref 0–0.1)
BASOPHILS NFR BLD: 0 % (ref 0–1)
BILIRUB SERPL-MCNC: 1 MG/DL (ref 0.2–1)
BUN SERPL-MCNC: 19 MG/DL (ref 6–20)
BUN/CREAT SERPL: 20 (ref 12–20)
CA-I BLD-MCNC: 9.4 MG/DL (ref 8.5–10.1)
CHLORIDE SERPL-SCNC: 105 MMOL/L (ref 97–108)
CO2 SERPL-SCNC: 22 MMOL/L (ref 21–32)
CREAT SERPL-MCNC: 0.95 MG/DL (ref 0.7–1.3)
DIFFERENTIAL METHOD BLD: ABNORMAL
EOSINOPHIL # BLD: 0.2 K/UL (ref 0–0.4)
EOSINOPHIL NFR BLD: 2 % (ref 0–7)
ERYTHROCYTE [DISTWIDTH] IN BLOOD BY AUTOMATED COUNT: 14.2 % (ref 11.5–14.5)
GLOBULIN SER CALC-MCNC: 4.8 G/DL (ref 2–4)
GLUCOSE BLD STRIP.AUTO-MCNC: 190 MG/DL (ref 65–100)
GLUCOSE BLD STRIP.AUTO-MCNC: 197 MG/DL (ref 65–100)
GLUCOSE BLD STRIP.AUTO-MCNC: 218 MG/DL (ref 65–100)
GLUCOSE BLD STRIP.AUTO-MCNC: 271 MG/DL (ref 65–100)
GLUCOSE SERPL-MCNC: 221 MG/DL (ref 65–100)
HCT VFR BLD AUTO: 38 % (ref 36.6–50.3)
HGB BLD-MCNC: 13 G/DL (ref 12.1–17)
IMM GRANULOCYTES # BLD AUTO: 0 K/UL
IMM GRANULOCYTES NFR BLD AUTO: 0 %
LYMPHOCYTES # BLD: 1.8 K/UL (ref 0.8–3.5)
LYMPHOCYTES NFR BLD: 20 % (ref 12–49)
Lab: NORMAL
Lab: NORMAL
MCH RBC QN AUTO: 32.5 PG (ref 26–34)
MCHC RBC AUTO-ENTMCNC: 34.2 G/DL (ref 30–36.5)
MCV RBC AUTO: 95 FL (ref 80–99)
MONOCYTES # BLD: 0.7 K/UL (ref 0–1)
MONOCYTES NFR BLD: 8 % (ref 5–13)
NEUTS BAND NFR BLD MANUAL: 5 % (ref 0–6)
NEUTS SEG # BLD: 6.4 K/UL (ref 1.8–8)
NEUTS SEG NFR BLD: 65 % (ref 32–75)
NRBC # BLD: 0 K/UL (ref 0–0.01)
NRBC BLD-RTO: 0 PER 100 WBC
PERFORMED BY:: ABNORMAL
PLATELET # BLD AUTO: 103 K/UL (ref 150–400)
PMV BLD AUTO: 10.4 FL (ref 8.9–12.9)
POTASSIUM SERPL-SCNC: 4.1 MMOL/L (ref 3.5–5.1)
PROT SERPL-MCNC: 7.4 G/DL (ref 6.4–8.2)
RBC # BLD AUTO: 4 M/UL (ref 4.1–5.7)
RBC MORPH BLD: ABNORMAL
SODIUM SERPL-SCNC: 136 MMOL/L (ref 136–145)
WBC # BLD AUTO: 9.1 K/UL (ref 4.1–11.1)

## 2024-04-15 PROCEDURE — 82962 GLUCOSE BLOOD TEST: CPT

## 2024-04-15 PROCEDURE — 85025 COMPLETE CBC W/AUTO DIFF WBC: CPT

## 2024-04-15 PROCEDURE — 6370000000 HC RX 637 (ALT 250 FOR IP): Performed by: HOSPITALIST

## 2024-04-15 PROCEDURE — 74230 X-RAY XM SWLNG FUNCJ C+: CPT

## 2024-04-15 PROCEDURE — 80053 COMPREHEN METABOLIC PANEL: CPT

## 2024-04-15 PROCEDURE — 2580000003 HC RX 258: Performed by: HOSPITALIST

## 2024-04-15 PROCEDURE — 6370000000 HC RX 637 (ALT 250 FOR IP): Performed by: PHYSICIAN ASSISTANT

## 2024-04-15 PROCEDURE — 2500000003 HC RX 250 WO HCPCS: Performed by: PHYSICIAN ASSISTANT

## 2024-04-15 PROCEDURE — 92611 MOTION FLUOROSCOPY/SWALLOW: CPT

## 2024-04-15 PROCEDURE — 36415 COLL VENOUS BLD VENIPUNCTURE: CPT

## 2024-04-15 RX ADMIN — LISINOPRIL 10 MG: 10 TABLET ORAL at 09:13

## 2024-04-15 RX ADMIN — NYSTATIN 500000 UNITS: 100000 SUSPENSION ORAL at 09:12

## 2024-04-15 RX ADMIN — BARIUM SULFATE 15 ML: 400 PASTE ORAL at 15:08

## 2024-04-15 RX ADMIN — BARIUM SULFATE 30 ML: 400 SUSPENSION ORAL at 15:08

## 2024-04-15 RX ADMIN — DORZOLAMIDE HCL 1 DROP: 20 SOLUTION/ DROPS OPHTHALMIC at 11:25

## 2024-04-15 RX ADMIN — TIMOLOL MALEATE 1 DROP: 5 SOLUTION/ DROPS OPHTHALMIC at 11:25

## 2024-04-15 RX ADMIN — LACTULOSE 20 G: 20 SOLUTION ORAL at 09:12

## 2024-04-15 RX ADMIN — LEVOFLOXACIN 750 MG: 500 TABLET, FILM COATED ORAL at 09:13

## 2024-04-15 RX ADMIN — BARIUM SULFATE 15 ML: 400 SUSPENSION ORAL at 15:10

## 2024-04-15 RX ADMIN — INSULIN LISPRO 4 UNITS: 100 INJECTION, SOLUTION INTRAVENOUS; SUBCUTANEOUS at 09:12

## 2024-04-15 RX ADMIN — BARIUM SULFATE 30 ML: 400 SUSPENSION ORAL at 15:10

## 2024-04-15 RX ADMIN — FLUCONAZOLE 200 MG: 100 TABLET ORAL at 09:12

## 2024-04-15 RX ADMIN — SODIUM CHLORIDE, PRESERVATIVE FREE 10 ML: 5 INJECTION INTRAVENOUS at 11:26

## 2024-04-15 ASSESSMENT — PAIN SCALES - GENERAL: PAINLEVEL_OUTOF10: 0

## 2024-04-15 NOTE — CARE COORDINATION
DC Plan: [facility]    Peer to peer was offered. Cm provided attending with peer to peer information.     __________________________________________________    Peer to peer was approved. Cm notified [facility] via careport.     __________________________________________________    Discharge is pending MBS.     [facility]. Transport set up for a \"will call\".

## 2024-04-15 NOTE — CARE COORDINATION
DC Plan: [facility]       Report: [phone]       Transport: [facility] 4:30pm    Ines can take pt at 4pm.     [facility] will need to be contacted for .     Cm spoke with pt and family at the bedside and updated them.     Cm called [facility]. ETA 4:30pm    Transition of Care Plan:    RUR: 13%  Prior Level of Functioning: needs assistance  Disposition: IRF  If SNF or IPR: Date FOC offered: 4/11  Date FOC received: 4/11  Accepting facility: [facility]  Date authorization started with reference number: 4/12  Date authorization received and expires: 415  Follow up appointments:   DME needed: None  Transportation at discharge: stretcher  IM/IMM Medicare/ letter given: N/A - IRF  Is patient a  and connected with [facility]?    If yes, was  transfer form completed and [facility] notified? N/A  Caregiver Contact: Spouse  Discharge Caregiver contacted prior to discharge? Yes  Care Conference needed? No  Barriers to discharge: None

## 2024-04-15 NOTE — DISCHARGE SUMMARY
Discharge Summary    Name: Florentino Dalal  [id number]  YOB: 1955 (Age: 68 y.o.)   Date of Admission: 4/9/2024  Date of Discharge: 4/14/2024  Attending Physician: Linus Springer MD    Discharge Diagnosis:   Principal Problem:    Altered mental status  Active Problems:    Pneumonia  Resolved Problems:    * No resolved hospital problems. *  Sepsis with urinary and pulmonary origin  Septic encephalopathy  Hepatic encephalopathy  Cirrhosis  Acute kidney injury  Right MCA distribution stroke with evolution  Acute subarachnoid bleed  Left parietal occipital subdural bleed  Glaucoma  Diabetes mellitus type 2  Dysphagia with PEG    Consultations:  IP CONSULT TO DIETITIAN  IP CONSULT TO TELE-NEUROLOGY  IP CONSULT TO DIETITIAN      Brief Admission History/Reason for Admission Per Aletha Mendoza MD:       Brief Hospital Course by Main Problems:   Florentino Dalal is a 68 y.o.  male with PMHx significant for  Diabetes mellitus, insulin-dependent, thrombocytopenia, cirrhosis, essential hypertension, internal carotid artery dissection, intracranial hemorrhage, altered mental status, right MCA stroke who was admitted to [facility] on 3/16/2024 for an acute stroke alert and slurred speech.  Patient was subsequently found to have a right MCA occlusion with a large right MCA distribution stroke.  Patient was treated with TNK after CT of the head and neck revealed right M1 segment occlusion, neurosurgery was consulted and angiogram was performed revealing a mid cervical ICA dissection.  Mechanical thrombectomy was performed with reperfusion.  Patient developed hemorrhagic transformation which was complicated by his cirrhosis and thrombocytopenia.  Patient developed mass effect with 8 mm midline shift and compression of the right ventricle.  Echocardiogram revealed an EF of 65 to 70% with no regional wall abnormalities or evidence of a shunt.  Patient subsequently underwent PEG 
                                       Discharge Summary    Name: Florentino Dalal  [id number]  YOB: 1955 (Age: 68 y.o.)   Date of Admission: 4/9/2024  Date of Discharge: 4/13/2024  Attending Physician: Linus Springer MD    Discharge Diagnosis:   Principal Problem:    Altered mental status  Active Problems:    Pneumonia  Resolved Problems:    * No resolved hospital problems. *  Sepsis with urinary and pulmonary origin  Septic encephalopathy  Hepatic encephalopathy  Cirrhosis  Acute kidney injury  Right MCA distribution stroke with evolution  Acute subarachnoid bleed  Left parietal occipital subdural bleed  Glaucoma  Diabetes mellitus type 2  Dysphagia with PEG    Consultations:  IP CONSULT TO DIETITIAN  IP CONSULT TO TELE-NEUROLOGY  IP CONSULT TO DIETITIAN      Brief Admission History/Reason for Admission Per Aletha Mendoza MD:       Brief Hospital Course by Main Problems:   Florentino Dalal is a 68 y.o.  male with PMHx significant for  Diabetes mellitus, insulin-dependent, thrombocytopenia, cirrhosis, essential hypertension, internal carotid artery dissection, intracranial hemorrhage, altered mental status, right MCA stroke who was admitted to [facility] on 3/16/2024 for an acute stroke alert and slurred speech.  Patient was subsequently found to have a right MCA occlusion with a large right MCA distribution stroke.  Patient was treated with TNK after CT of the head and neck revealed right M1 segment occlusion, neurosurgery was consulted and angiogram was performed revealing a mid cervical ICA dissection.  Mechanical thrombectomy was performed with reperfusion.  Patient developed hemorrhagic transformation which was complicated by his cirrhosis and thrombocytopenia.  Patient developed mass effect with 8 mm midline shift and compression of the right ventricle.  Echocardiogram revealed an EF of 65 to 70% with no regional wall abnormalities or evidence of a shunt.  Patient subsequently underwent PEG 
04/15/24  0740      K 4.1      CO2 22   BUN 19   CREATININE 0.95   GLUCOSE 221*   CALCIUM 9.4       Recent Labs     04/15/24  0740   HGB 13.0   HCT 38.0   WBC 9.1   *         Discharge Medications:     Medication List        START taking these medications      fluconazole 200 MG tablet  Commonly known as: DIFLUCAN  Take 1 tablet by mouth daily for 7 days     ibuprofen 100 MG/5ML suspension  Commonly known as: ADVIL;MOTRIN  10 mLs by Per NG tube route every 6 hours as needed for Fever or Pain     levoFLOXacin 750 MG tablet  Commonly known as: LEVAQUIN  1 tablet by Per G Tube route daily for 5 doses     nystatin 361120 UNIT/ML suspension  Commonly known as: MYCOSTATIN  Take 5 mLs by mouth 4 times daily            CHANGE how you take these medications      acetaminophen 325 MG tablet  Commonly known as: TYLENOL  Take 2 tablets by mouth every 12 hours as needed for Pain  What changed: when to take this     aspirin 81 MG chewable tablet  Take 1 tablet by mouth daily  Start taking on: April 26, 2024  What changed: These instructions start on April 26, 2024. If you are unsure what to do until then, ask your doctor or other care provider.            CONTINUE taking these medications      * Ventolin  (90 Base) MCG/ACT inhaler  Generic drug: albuterol sulfate HFA     * albuterol (2.5 MG/3ML) 0.083% nebulizer solution  Commonly known as: PROVENTIL     bisacodyl 10 MG suppository  Commonly known as: DULCOLAX     calcium carbonate 500 MG Tabs tablet  Commonly known as: OSCAL     dorzolamide-timolol 2-0.5 % ophthalmic solution  Commonly known as: COSOPT     hydrALAZINE 25 MG tablet  Commonly known as: APRESOLINE     hydrOXYzine HCl 25 MG tablet  Commonly known as: ATARAX     insulin glargine 100 UNIT/ML injection vial  Commonly known as: LANTUS     insulin lispro 100 UNIT/ML Soln injection vial  Commonly known as: HUMALOG     lactulose 20 g packet  Commonly known as: CEPHULAC     latanoprost 0.005 % 
89 19 99 %       Physical Exam  Vitals reviewed.   Cardiovascular:      Rate and Rhythm: Normal rate and regular rhythm.      Heart sounds: Normal heart sounds.   Pulmonary:      Effort: Pulmonary effort is normal.      Breath sounds: Normal breath sounds.   Abdominal:      General: Abdomen is flat. Bowel sounds are normal.      Palpations: Abdomen is soft.   Musculoskeletal:      Comments: Left-sided hemiparesis  Skin:     General: Skin is warm and dry.   Neurological:      Mental Status: He is alert.      Comments: Left facial droop    Discharge/Recent Laboratory Results:  Recent Labs     04/10/24  0430 04/11/24  0420 04/12/24  0251   *   < > 135*   K 3.6   < > 3.8      < > 107   CO2 24   < > 22   BUN 26*   < > 19   CREATININE 0.98   < > 0.78   GLUCOSE 153*   < > 301*   CALCIUM 8.2*   < > 8.5   PHOS 2.5*  --   --     < > = values in this interval not displayed.     Recent Labs     04/12/24  0251   HGB 10.7*   HCT 31.0*   WBC 6.2   PLT 72*       Discharge Medications:     Medication List        START taking these medications      ibuprofen 100 MG/5ML suspension  Commonly known as: ADVIL;MOTRIN  10 mLs by Per NG tube route every 6 hours as needed for Fever or Pain            CHANGE how you take these medications      acetaminophen 325 MG tablet  Commonly known as: TYLENOL  Take 2 tablets by mouth every 12 hours as needed for Pain  What changed: when to take this     aspirin 81 MG chewable tablet  Take 1 tablet by mouth daily  Start taking on: April 26, 2024  What changed: These instructions start on April 26, 2024. If you are unsure what to do until then, ask your doctor or other care provider.            CONTINUE taking these medications      * Ventolin  (90 Base) MCG/ACT inhaler  Generic drug: albuterol sulfate HFA     * albuterol (2.5 MG/3ML) 0.083% nebulizer solution  Commonly known as: PROVENTIL     amantadine 100 MG capsule  Commonly known as: SYMMETREL     bisacodyl 10 MG

## 2024-04-15 NOTE — PROGRESS NOTES
Hospitalist Progress Note    NAME:   Florentino Dalal   : 1955   MRN: [id number]     Date/Time: 4/10/2024 5:19 PM  Patient PCP: No primary care provider on file.    Estimated discharge date:48  Barriers: Urine culture, tele-neurology    Hospital course:  Florentino Dalal is a 68 y.o.  male with PMHx significant for Florentino Dalal is a 68 y.o.  male with PMHx significant for  Diabetes mellitus, insulin-dependent, thrombocytopenia, cirrhosis, essential hypertension, internal carotid artery dissection, intracranial hemorrhage, altered mental status, right MCA stroke who was admitted to [facility] on 3/16/2024 for an acute stroke alert and slurred speech.  Patient was subsequently found to have a right MCA occlusion with a large right MCA distribution stroke.  Patient was treated with TNK after CT of the head and neck revealed right M1 segment occlusion, neurosurgery was consulted and angiogram was performed revealing a mid cervical ICA dissection.  Mechanical thrombectomy was performed with reperfusion.  Patient developed hemorrhagic transformation which was complicated by his cirrhosis and thrombocytopenia.  Patient developed mass effect with 8 mm midline shift and compression of the right ventricle.  Echocardiogram revealed an EF of 65 to 70% with no regional wall abnormalities or evidence of a shunt.  Patient subsidy underwent PEG tube placement due to ongoing dysphagia.  Repeat angiogram on 2024 revealed a dissection flap extending from the cervical right internal carotid artery into the proximal petrous segment of the internal carotid artery at the level of the intracranial insertion.  All other vessels revealed patency without flow-limiting lesions.  CT on 3/26/2024 at [facility] again showed large right MCA territorial infarction with edema with focal hematoma.  There was subdural hematoma also present with a leftward midline shift of approximately 7 mm.  Patient was ultimately discharged to [facility] with 
  My colleague already reviewed this patient, had a long discussion with the patient family.  Requested to transfer to the [facility], also tried it [facility] which did not happen.  Physician from [facility] already talked to the patient family and requested them to be admitted here, they are agreeable.  History and physical was already done.  
2230 Received patient from CVICU, a case of AMS for further evaluation and management. Skin assessment done noted bruising on the bilateral upper limbs and the abdomen, otherwise no skin breakdown. Patient and the wife refused for bolus feeding at 12midnight and at 4am but they were informed that blood sugar must be monitored every 4 hours to monitor hypoglycemia, agreed and understood. Reorient to the room, educated on using the call bell and fall preventive measures, verbalized understanding.   
4 Eyes Skin Assessment     NAME:  Florentino Dalal  YOB: 1955  MEDICAL RECORD NUMBER:  [id number]    The patient is being assessed for  Transfer to New Unit    I agree that at least one RN has performed a thorough Head to Toe Skin Assessment on the patient. ALL assessment sites listed below have been assessed.      Areas assessed by both nurses:    Head, Face, Ears, Shoulders, Back, Chest, Arms, Elbows, Hands, Sacrum. Buttock, Coccyx, Ischium, Legs. Feet and Heels, and Under Medical Devices         Does the Patient have a Wound? No noted wound(s)       [place] Prevention initiated by RN: Yes  Wound Care Orders initiated by RN: No    Pressure Injury (Stage 3,4, Unstageable, DTI, NWPT, and Complex wounds) if present, place Wound referral order by RN under : No    New Ostomies, if present place, Ostomy referral order under : No     Nurse 1 eSignature: Electronically signed by Tiffanie Billy RN on 4/13/24 at 1:17 AM EDT    **SHARE this note so that the co-signing nurse can place an eSignature**    Nurse 2 eSignature: Electronically signed by Linda Madden RN on 4/13/24 at 1:35 AM EDT   
Attempted PT 1435 however pt off the floor at this time. Will continue to follow and attempt at a later time. Thank you.  
Discharge paperwork completed. Print and send with patient when patient is ready to leave.  
Follow Up Comprehensive Nutrition Assessment    Type and Reason for Visit:  Consult (TF)    Nutrition Recommendations/Plan:   None at this time     Malnutrition Assessment:  Malnutrition Status:  No malnutrition (04/10/24 1058)    Context:  Acute Illness     Findings of the 6 clinical characteristics of malnutrition:  Energy Intake:  No significant decrease in energy intake  Weight Loss:  Unable to assess (estimated weight available)     Body Fat Loss:  No significant body fat loss     Muscle Mass Loss:  No significant muscle mass loss    Fluid Accumulation:  No significant fluid accumulation     Strength:  Not Performed    Nutrition Assessment:    Admitted for AMS. S/p recent CVA with PEG placed 3/31/24 per chart review - hx twocal HN 237mL 4x/day f/b 100mL H2O flush. UTO weight hx - last measured weight 92.7kg at OSH, only estimated weight available s/p admit. Rec's above. Labs: Na 135, K 3.6, BUN 26, Creat 0.98, Gluc 168. Meds: insulin glargine, insulin lispro    Nutrition Related Findings:    NFPE without acute findings. No n/v, d/c, hx dysphagia +PEG. No edema. BM PTA. Wound Type: None       Current Nutrition Intake & Therapies:    Average Meal Intake: Unable to assess  Average Supplements Intake: None Ordered  ADULT TUBE FEEDING; PEG; 2.0 Calorie; Bolus; 5 Times Daily; 237; Syringe Push; 250; After bolus  ADULT DIET; Dysphagia - Pureed; Moderately Thick (Honey)    Anthropometric Measures:  Height: 190.5 cm (6' 3\")  Ideal Body Weight (IBW): 196 lbs (89 kg)       Current Body Weight: 117.9 kg (259 lb 14.8 oz), 132.6 % IBW. Weight Source: Other (Comment) (estimated, last measured weight 92.7kg 3/16/24 OSH)  Current BMI (kg/m2): 32.5                          BMI Categories: Obese Class 1 (BMI 30.0-34.9)    Estimated Daily Nutrient Needs:  Energy Requirements Based On: Formula  Weight Used for Energy Requirements: Other (Comment)  Energy (kcal/day): 2318kcal (25kcal/kg)  Weight Used for Protein Requirements: 
New ST consult placed. Clinician discussed with MD previous SLP's rec for MBS prior to d/c and prior to diet initiation due to silent aspiration on previous MBS. MD is agreeable with MBS scheduled for 4/15 to further assess swallow function  
[facility] NEUROLOGY PROGRESS NOTE   Date of Service 4/13/2024          ASSESSMENT  Transient encephalopathy- metabolic, sepsis, UTI,PNA, dehydration, VERA, hyperammonemia  Currently at his baseline per wife  Clinical presentation not c/w seizures     Hx R MCA CVA, GINA dissection, s/p mechanical thrombectomy, followed by hemorrhagic transformation, mid line shift, managed conservatively; residual L facial droop, L hemiparesis. Clinically stable.     Dysphagia ; PEG+- recent bleeding at PEG site- prompted pausing aspirin and heparin SQ per wife  one week ago; No active bleeding was reported per RN     DM-II A1c 10.6  Hyperlipidemia     CT head -4/11- Large right MCA territory infarction, with areas of encephalomalacia and  laminar necrosis, though with persistent vague areas of mixed density in the  basal ganglia and anterior/posterior right temporal lobe which again may  indicate areas of hemorrhage or mass. Unchanged regional mass effect and 4 mm  right to left midline shift. Consider MRI for better characterization.  2. Unchanged small right temporal subdural isodense hematoma.  3. Slight decreased conspicuity of left occipital subarachnoid hemorrhage.    Pertinent interim neurodiagnostic studies- personally reviewed imaging and concur with the reports    PLAN  Hold antiplatelet agents and anticoagulants   Recommend CT head w/o c on 4/16- for clearance for aspirin  OK to resume lovenox for DVT ppx     Dc keppra- wife reported intolerance in the past-agitation and no seizures were reported    Wife says he was not on ritalin or amantadine prior to admission and she is not sure why he was prescribed them here- she requested that we discontinue them    She also refused senna- dcd it    Lactulose, serial ammonia levels- per prim team    Hydrate, optimize treatment of UTI, PNA and hyperammonemia - per prim team     Unable to do statin due to cirrhosis  Risk factor modification  Goal BP- normotension  Monitor 
OT tx attempted at 1435 however pt off the unit. Will continue to follow and re-attempt OT at a later time. Thank you.     
Patient resting following working w/ PT. Per wife, he is tolerating ice chips when requested however he is rarely requesting at this time. ST will cont to follow. Rec repeat MBS prior to diet initiation s/t silent aspiration on previous MBS.   
Patient stable for DC. Discharge paperwork discussed with patient and wife. They voiced an understanding. IV removed. Patient is discharged to [facility]. [facility] will provider transportation. Attempted to call report. [facility] stated someone will call me back   
Per patient wife she no longer would like the patient to be q1h neuro assessments due to the patient not being able to rest adequately. The RN educated the wife and explained the risk of not having frequent neuro checks.  
Report called to [facility] at [facility]   
Vancomycin Dosing Consult  Florentino Dalal is a 68 y.o. male with Sepsis. Pharmacy was consulted by Dr. Mendoza to dose and monitor Vancomycin. Today is day 2.    Antibiotic regimen: Vancomycin + Zosyn      Temp (24hrs), Av.4 °F (36.9 °C), Min:98.1 °F (36.7 °C), Max:98.6 °F (37 °C)    Recent Labs     24  1320   WBC 19.7*     Recent Labs     24  1320 24  1327   CREATININE 1.37* 1.05   BUN 39*  --        No results for input(s): \"CRP\" in the last 72 hours.  No results for input(s): \"PROCAL\" in the last 72 hours.    Estimated Creatinine Clearance: 91 mL/min (based on SCr of 1.05 mg/dL). ml/min  Concomitant nephrotoxic drugs: None    Cultures:    Blood x 2 - Pending   Urine - Pending    MRSA Swab: Not ordered, patient already received first dose of vancomycin    Target range: AUC/ARLET 400-600       Assessment/Plan:   Pt received an initial Vancomycin dose of 1500 mg iv x 1 dose yesterday afternoon. Will follow up today with MD of 1750 mg iv q 24 hrs for a projected AUC of 479. A level has been scheduled for tomorrow @ 1200  Antimicrobial stop date 7 days      
Impaired  Sitting - Static: Poor (constant support)  Sitting - Dynamic: Poor (constant support)      ADL Assessment:                  Grooming: Setup  Grooming Skilled Clinical Factors: Using swab to clean teeth                                    Toileting: Maximum assistance              Therapeutic Exercises:       Functional Measure:    [facility] AM-PAC \"6 Clicks\"                                                       Daily Activity Inpatient Short Form  How much help from another person does the patient currently need... Total; A Lot A Little None   1.  Putting on and taking off regular lower body clothing? []  1 [x]  2 []  3 []  4   2.  Bathing (including washing, rinsing, drying)? []  1 [x]  2 []  3 []  4   3.  Toileting, which includes using toilet, bedpan or urinal? [] 1 [x]  2 []  3 []  4   4.  Putting on and taking off regular upper body clothing? []  1 [x]  2 []  3 []  4   5.  Taking care of personal grooming such as brushing teeth? []  1 []  2 [x]  3 []  4   6.  Eating meals? []  1 []  2 [x]  3 []  4   © 2007, Trustees of [facility], under license to [employer]. All rights reserved     Score: 14/24     Interpretation of Tool:  Represents clinically-significant functional categories (i.e. Activities of daily living).  Percentage of Impairment CH    0%   CI    1-19% CJ    20-39% CK    40-59% CL    60-79% CM    80-99% CN     100%   [facility] [address]  Score 6-24 24 23 20-22 15-19 10-14 7-9 6     Occupational Therapy Evaluation Charge Determination   History Examination Decision-Making   MEDIUM Complexity : Expanded review of history including physical, cognitive and psychocial history  MEDIUM Complexity: 3-5 Performance deficits relating to physical, cognitive, or psychosocial skills that result in activity limitations and/or participation restrictions HIGH Complexity: Patient presents with comorbidities that affect occupational performance.  Significant modifications of tasks or assistance (eg. 
wheelchair)?   [] 1   [x] 2   [] 3   [] 4   5.  Need to walk in hospital room?   [x] 1   [] 2   [] 3   [] 4   6.  Climbing 3-5 steps with a railing?   [x] 1   [] 2   [] 3   [] 4   © , Trustees of [facility], under license to [employer]. All rights reserved     Score:  Initial: 10/24 Most Recent: (Date: 2024 )   Interpretation of Tool:  Represents activities that are increasingly more difficult (i.e. Bed mobility, Transfers, Gait).  Score 24 23 22-20 19-15 14-10 9-7 6   Modifier CH CI CJ CK CL CM CN         Physical Therapy Evaluation Charge Determination   History Examination Presentation Decision-Making   HIGH Complexity :3+ comorbidities / personal factors will impact the outcome/ POC  HIGH Complexity : 4+ Standardized tests and measures addressing body structure, function, activity limitation and / or participation in recreation  LOW Complexity : Stable, uncomplicated  Other outcome measures ampa 6  MEDIUM      Based on the above components, the patient evaluation is determined to be of the following complexity level: LOW    Pain Ratin/10 reported  Pain Intervention(s):   pain is at a level acceptable to the patient    Activity Tolerance:   Fair , requires frequent rest breaks, and fatigues quickly with mobility     After treatment patient left in no apparent distress:   Bed locked and in lowest position Patient left in no apparent distress in bed, Call bell within reach, Bed/ chair alarm activated, Caregiver / family present, and Side rails x3 and nsg updated.    COMMUNICATION/EDUCATION:   The patient’s plan of care was discussed with: Occupational therapist, Registered nurse, and Case management     Patient Education  Education Given To: Patient;Family  Education Provided: Role of Therapy;Plan of Care;Fall Prevention Strategies;Transfer Training;Equipment;Home Exercise Program;Family Education  Education Provided Comments: DC recommendations, PT POC, LE therex, safety with mobility, 
-- -- -- 91 17 97 %   04/10/24 1319 -- -- -- 93 13 96 %   04/10/24 1318 -- -- -- 88 (!) 8 96 %   04/10/24 1317 -- -- -- 88 11 97 %   04/10/24 1316 -- -- -- 92 12 95 %   04/10/24 1315 -- -- -- 85 (!) 0 96 %   04/10/24 1314 -- -- -- 85 12 97 %   04/10/24 1313 -- -- -- 85 (!) 3 96 %   04/10/24 1312 -- -- -- 88 15 97 %   04/10/24 1311 -- -- -- 89 11 95 %   04/10/24 1310 -- -- -- 85 -- 94 %   04/10/24 1309 -- -- -- 84 10 97 %   04/10/24 1308 -- -- -- 84 11 95 %   04/10/24 1307 -- -- -- 82 16 97 %   04/10/24 1306 -- -- -- 86 16 96 %   04/10/24 1305 -- -- -- 86 17 97 %   04/10/24 1304 -- -- -- 86 17 96 %   04/10/24 1303 -- -- -- 87 21 98 %   04/10/24 1302 -- -- -- 88 17 96 %           Intake/Output Summary (Last 24 hours) at 4/11/2024 1301  Last data filed at 4/11/2024 0330  Gross per 24 hour   Intake 1254.43 ml   Output 1200 ml   Net 54.43 ml          I had a face to face encounter and independently examined this patient on 4/11/2024, as outlined below:    Review of Systems   Unable to perform ROS: Acuity of condition   Constitutional:  Positive for fatigue.   Respiratory: Negative.     Cardiovascular:  Negative for chest pain and leg swelling.   Gastrointestinal: Negative.    Genitourinary: Negative.    Musculoskeletal: Negative.    Neurological:  Positive for facial asymmetry, speech difficulty and weakness.   Psychiatric/Behavioral: Negative.          PHYSICAL EXAM:  Physical Exam  Vitals reviewed.   Cardiovascular:      Rate and Rhythm: Normal rate and regular rhythm.      Heart sounds: Normal heart sounds.   Pulmonary:      Effort: Pulmonary effort is normal.      Breath sounds: Normal breath sounds.   Abdominal:      General: Abdomen is flat. Bowel sounds are normal.      Palpations: Abdomen is soft.   Musculoskeletal:      Comments: Left-sided hemiparesis  Right side response to painful stimuli   Skin:     General: Skin is warm and dry.   Neurological:      Mental Status: He is alert.      Comments: Left facial

## 2024-04-15 NOTE — PLAN OF CARE
Problem: Discharge Planning  Goal: Discharge to home or other facility with appropriate resources  4/14/2024 2337 by Tiffanie Billy RN  Outcome: Progressing  Flowsheets (Taken 4/14/2024 2015)  Discharge to home or other facility with appropriate resources: Identify barriers to discharge with patient and caregiver  4/14/2024 1149 by Paula Chavez RN  Outcome: Progressing  Flowsheets (Taken 4/14/2024 0919)  Discharge to home or other facility with appropriate resources: Identify barriers to discharge with patient and caregiver     Problem: Chronic Conditions and Co-morbidities  Goal: Patient's chronic conditions and co-morbidity symptoms are monitored and maintained or improved  4/14/2024 2337 by Tiffanie Billy RN  Outcome: Progressing  Flowsheets (Taken 4/14/2024 2015)  Care Plan - Patient's Chronic Conditions and Co-Morbidity Symptoms are Monitored and Maintained or Improved: Monitor and assess patient's chronic conditions and comorbid symptoms for stability, deterioration, or improvement  4/14/2024 1149 by Paula Chavez RN  Outcome: Progressing  Flowsheets (Taken 4/14/2024 0919)  Care Plan - Patient's Chronic Conditions and Co-Morbidity Symptoms are Monitored and Maintained or Improved: Monitor and assess patient's chronic conditions and comorbid symptoms for stability, deterioration, or improvement     Problem: Safety - Adult  Goal: Free from fall injury  4/14/2024 2337 by Tiffanie Billy RN  Outcome: Progressing  Flowsheets (Taken 4/14/2024 2212)  Free From Fall Injury: Instruct family/caregiver on patient safety  4/14/2024 1149 by Paula Chavez RN  Outcome: Progressing  Flowsheets (Taken 4/14/2024 0923)  Free From Fall Injury: Instruct family/caregiver on patient safety     Problem: Neurosensory - Adult  Goal: Achieves stable or improved neurological status  4/14/2024 2337 by Tiffanie Billy RN  Outcome: Progressing  Flowsheets (Taken 4/14/2024 2015)  Achieves stable or improved neurological status: 
  Problem: Discharge Planning  Goal: Discharge to home or other facility with appropriate resources  Outcome: Not Progressing     Problem: Chronic Conditions and Co-morbidities  Goal: Patient's chronic conditions and co-morbidity symptoms are monitored and maintained or improved  Outcome: Not Progressing     Problem: Musculoskeletal - Adult  Goal: Return ADL status to a safe level of function  Outcome: Not Progressing     Problem: Discharge Planning  Goal: Discharge to home or other facility with appropriate resources  Outcome: Not Progressing     Problem: Chronic Conditions and Co-morbidities  Goal: Patient's chronic conditions and co-morbidity symptoms are monitored and maintained or improved  Outcome: Not Progressing     Problem: Musculoskeletal - Adult  Goal: Return ADL status to a safe level of function  Outcome: Not Progressing     
  Problem: Discharge Planning  Goal: Discharge to home or other facility with appropriate resources  Outcome: Progressing     Problem: Chronic Conditions and Co-morbidities  Goal: Patient's chronic conditions and co-morbidity symptoms are monitored and maintained or improved  Outcome: Progressing     Problem: Safety - Adult  Goal: Free from fall injury  Outcome: Progressing     Problem: Neurosensory - Adult  Goal: Achieves stable or improved neurological status  Outcome: Progressing  Goal: Absence of seizures  Outcome: Progressing     Problem: Respiratory - Adult  Goal: Achieves optimal ventilation and oxygenation  Outcome: Progressing     Problem: Skin/Tissue Integrity - Adult  Goal: Skin integrity remains intact  Outcome: Progressing     
  Problem: Discharge Planning  Goal: Discharge to home or other facility with appropriate resources  Outcome: Progressing  Flowsheets  Taken 4/13/2024 2000 by Tiffanie Billy RN  Discharge to home or other facility with appropriate resources: Identify barriers to discharge with patient and caregiver  Taken 4/13/2024 1814 by Paula Chavez RN  Discharge to home or other facility with appropriate resources: Identify barriers to discharge with patient and caregiver     Problem: Chronic Conditions and Co-morbidities  Goal: Patient's chronic conditions and co-morbidity symptoms are monitored and maintained or improved  Outcome: Progressing  Flowsheets  Taken 4/13/2024 2000 by Tiffanie Billy RN  Care Plan - Patient's Chronic Conditions and Co-Morbidity Symptoms are Monitored and Maintained or Improved: Monitor and assess patient's chronic conditions and comorbid symptoms for stability, deterioration, or improvement  Taken 4/13/2024 1814 by Paula Chavez RN  Care Plan - Patient's Chronic Conditions and Co-Morbidity Symptoms are Monitored and Maintained or Improved: Monitor and assess patient's chronic conditions and comorbid symptoms for stability, deterioration, or improvement     Problem: Safety - Adult  Goal: Free from fall injury  Outcome: Progressing  Flowsheets  Taken 4/13/2024 2200 by Tiffanie Billy RN  Free From Fall Injury: Instruct family/caregiver on patient safety  Taken 4/13/2024 1818 by Paula Chavez RN  Free From Fall Injury: Instruct family/caregiver on patient safety     Problem: Neurosensory - Adult  Goal: Achieves stable or improved neurological status  Outcome: Progressing  Flowsheets  Taken 4/13/2024 2000 by Tiffanie Billy RN  Achieves stable or improved neurological status: Assess for and report changes in neurological status  Taken 4/13/2024 1814 by Paula Chavez RN  Achieves stable or improved neurological status: Assess for and report changes in neurological status  Goal: Absence of 
  Problem: Discharge Planning  Goal: Discharge to home or other facility with appropriate resources  Outcome: Progressing  Flowsheets (Taken 4/12/2024 2230)  Discharge to home or other facility with appropriate resources: Identify barriers to discharge with patient and caregiver     Problem: Chronic Conditions and Co-morbidities  Goal: Patient's chronic conditions and co-morbidity symptoms are monitored and maintained or improved  Outcome: Progressing  Flowsheets (Taken 4/12/2024 2230)  Care Plan - Patient's Chronic Conditions and Co-Morbidity Symptoms are Monitored and Maintained or Improved: Monitor and assess patient's chronic conditions and comorbid symptoms for stability, deterioration, or improvement     Problem: Safety - Adult  Goal: Free from fall injury  Outcome: Progressing  Flowsheets (Taken 4/13/2024 0057)  Free From Fall Injury: Instruct family/caregiver on patient safety     Problem: Neurosensory - Adult  Goal: Achieves stable or improved neurological status  Outcome: Progressing  Flowsheets (Taken 4/12/2024 2230)  Achieves stable or improved neurological status: Assess for and report changes in neurological status  Goal: Absence of seizures  Outcome: Progressing  Flowsheets (Taken 4/12/2024 2230)  Absence of seizures: Support airway/breathing, administer oxygen as needed  Goal: Achieves maximal functionality and self care  Outcome: Progressing  Flowsheets (Taken 4/12/2024 2230)  Achieves maximal functionality and self care: Monitor swallowing and airway patency with patient fatigue and changes in neurological status     Problem: Respiratory - Adult  Goal: Achieves optimal ventilation and oxygenation  Outcome: Progressing  Flowsheets (Taken 4/12/2024 2230)  Achieves optimal ventilation and oxygenation: Assess for changes in respiratory status     Problem: Cardiovascular - Adult  Goal: Maintains optimal cardiac output and hemodynamic stability  Outcome: Progressing  Flowsheets (Taken 4/12/2024 
  Problem: Discharge Planning  Goal: Discharge to home or other facility with appropriate resources  Outcome: Progressing  Flowsheets (Taken 4/13/2024 1814 by Paula Chavez RN)  Discharge to home or other facility with appropriate resources: Identify barriers to discharge with patient and caregiver     Problem: Chronic Conditions and Co-morbidities  Goal: Patient's chronic conditions and co-morbidity symptoms are monitored and maintained or improved  Outcome: Progressing  Flowsheets (Taken 4/13/2024 1814 by Paula Chavez RN)  Care Plan - Patient's Chronic Conditions and Co-Morbidity Symptoms are Monitored and Maintained or Improved: Monitor and assess patient's chronic conditions and comorbid symptoms for stability, deterioration, or improvement     Problem: Safety - Adult  Goal: Free from fall injury  Outcome: Progressing  Flowsheets (Taken 4/13/2024 1818 by Paula Chavez RN)  Free From Fall Injury: Instruct family/caregiver on patient safety     Problem: Neurosensory - Adult  Goal: Achieves stable or improved neurological status  Outcome: Progressing  Flowsheets (Taken 4/13/2024 1814 by Paula Chavez RN)  Achieves stable or improved neurological status: Assess for and report changes in neurological status  Goal: Absence of seizures  Outcome: Progressing  Flowsheets (Taken 4/13/2024 1814 by Paula Chavez RN)  Absence of seizures: Monitor for seizure activity.  If seizure occurs, document type and location of movements and any associated apnea  Goal: Achieves maximal functionality and self care  Outcome: Progressing  Flowsheets (Taken 4/13/2024 1814 by Paula Chavez RN)  Achieves maximal functionality and self care: Monitor swallowing and airway patency with patient fatigue and changes in neurological status     Problem: Respiratory - Adult  Goal: Achieves optimal ventilation and oxygenation  Outcome: Progressing     Problem: Cardiovascular - Adult  Goal: Maintains optimal cardiac output and hemodynamic 
  Problem: Discharge Planning  Goal: Discharge to home or other facility with appropriate resources  Recent Flowsheet Documentation  Taken 4/11/2024 2000 by Igor Sparks RN  Discharge to home or other facility with appropriate resources:   Identify barriers to discharge with patient and caregiver   Arrange for needed discharge resources and transportation as appropriate   Identify discharge learning needs (meds, wound care, etc)  4/11/2024 1804 by Elena Escobar RN  Outcome: Progressing     Problem: Chronic Conditions and Co-morbidities  Goal: Patient's chronic conditions and co-morbidity symptoms are monitored and maintained or improved  Recent Flowsheet Documentation  Taken 4/11/2024 2000 by Igor Sparks RN  Care Plan - Patient's Chronic Conditions and Co-Morbidity Symptoms are Monitored and Maintained or Improved:   Monitor and assess patient's chronic conditions and comorbid symptoms for stability, deterioration, or improvement   Collaborate with multidisciplinary team to address chronic and comorbid conditions and prevent exacerbation or deterioration   Update acute care plan with appropriate goals if chronic or comorbid symptoms are exacerbated and prevent overall improvement and discharge  4/11/2024 1804 by Elena Escobar RN  Outcome: Progressing     Problem: Safety - Adult  Goal: Free from fall injury  Recent Flowsheet Documentation  Taken 4/11/2024 2000 by Igor Sparks RN  Free From Fall Injury:   Instruct family/caregiver on patient safety   Based on caregiver fall risk screen, instruct family/caregiver to ask for assistance with transferring infant if caregiver noted to have fall risk factors  4/11/2024 1804 by Elena Escobar RN  Outcome: Progressing     Problem: Neurosensory - Adult  Goal: Achieves stable or improved neurological status  Recent Flowsheet Documentation  Taken 4/11/2024 2000 by Igor Sparks RN  Achieves stable or improved neurological status:   Assess for and report changes 
  Problem: Nutrition Deficit:  Goal: Optimize nutritional status  Outcome: Progressing  Flowsheets (Taken 4/15/2024 1538)  Nutrient intake appropriate for improving, restoring, or maintaining nutritional needs:   Assess nutritional status and recommend course of action   Monitor oral intake, labs, and treatment plans   Recommend appropriate diets, oral nutritional supplements, and vitamin/mineral supplements   Recommend, monitor, and adjust tube feedings and TPN/PPN based on assessed needs     
  Problem: Occupational Therapy - Adult  Goal: By Discharge: Performs self-care activities at highest level of function for planned discharge setting.  See evaluation for individualized goals.  Description: FUNCTIONAL STATUS PRIOR TO ADMISSION:  Indep with all adls and functional mobility    HOME SUPPORT: The patient lived with wife but did not require assistance.     OCCUPATIONAL THERAPY TREATMENT  Patient: Florentino Dalal (68 y.o. male)  Date: 4/13/2024  Primary Diagnosis: Altered mental status [R41.82]  History of intracranial hemorrhage [Z86.79]  HAP (hospital-acquired pneumonia) [J18.9, Y95]       Precautions: Fall Risk, Aspiration Risk, NPO                Recommendations for nursing mobility: Frequent repositioning to prevent skin breakdown and Assist x2    In place during session: Peripheral IV, External Catheter, EKG/telemetry , and PEG Tube  Chart, occupational therapy assessment, plan of care, and goals were reviewed.  ASSESSMENT  Patient continues with skilled OT services and is progressing towards goals. Pt received supine in bed with family present in room upon OT arrival, agreeable to session. Pt A&O x 4. Pt requiring max Ax2 for all bed mobility and sat EOB approximately 15 mins. While sitting up, pt performing dynamic sitting reaching task and LB therex 1 set 10 reps including LAQ and ankle pumps. Pt requiring max posterior support sitting EOB. Pt reporting he was tired and needed to lay back down max Ax2. (See below for objective details and assist levels).     Overall pt tolerated session fair today with pt easily fatigued. Potential barriers for safe discharge: pt is not safe to be alone and concern for pt safely navigating or managing the home environment. Current OT recommendations for discharge [facility] . Will continue to benefit from skilled OT services, and will continue to progress as tolerated.      Start of Session End of Session   SPO2 (%) 98 97   Heart Rate 
  Problem: Physical Therapy - Adult  Goal: By Discharge: Performs mobility at highest level of function for planned discharge setting.  See evaluation for individualized goals.  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    Physical Therapy Goals  Initiated 4/12/2024  Pt stated goal: to get stronger  Pt will be I with LE HEP in 7 days.  Pt will perform bed mobility with Minimal Assist in 7 days.  Pt will perform transfers with Minimal Assist in 7 days.   Pt will demonstrate improvement in seated balance from Maximal Assist to Minimal Assist in 7 days.      Outcome: Progressing            PHYSICAL THERAPY TREATMENT     Patient: Florentino Dalal (68 y.o. male)  Date: 4/13/2024  Diagnosis: Altered mental status [R41.82]  History of intracranial hemorrhage [Z86.79]  HAP (hospital-acquired pneumonia) [J18.9, Y95] Altered mental status      Precautions: Fall Risk, Aspiration Risk, NPO                      Recommendations for nursing mobility: Encourage HEP in prep for ADLs/mobility; see handout for details, Frequent repositioning to prevent skin breakdown, Use of bed/chair alarm for safety, and Assist x2    In place during session: Peripheral IV, Nasal Cannula 2L, External Catheter, and EKG/telemetry   Chart, physical therapy assessment, plan of care and goals were reviewed.  ASSESSMENT  Patient continues with skilled PT services and is progressing towards goals. Pt semi-supine upon PT arrival, agreeable to session. Pt A&O x 4. Family present in room with permission from patient. (See below for objective details and assist levels).     Overall pt tolerated session fair today with transfer to EOB performed with maxA x 2 and additional time. Cues for 
OT/PT tx attempt at 0855 however pt and his wife requesting tube feeding prior to tx. Will continue to follow pt later date/time as able. Thank you.  
PT/OT attempted to see patient for therapy session but upon entry into room, nursing about to give PEG tube feed and asked if therapy could return at a later time for session. Will continue to follow patient and attempt skilled therapy session at a later time and/or date as appropriate.   
SPEECH PATHOLOGY MODIFIED BARIUM SWALLOW STUDY  Patient: Florentino Dalal (68 y.o. male)  Date: 4/15/2024  Primary Diagnosis: Altered mental status [R41.82]  History of intracranial hemorrhage [Z86.79]  HAP (hospital-acquired pneumonia) [J18.9, Y95]       Precautions: aspiration  Fall Risk, Aspiration Risk, NPO                  DIET RECOMMENDATIONS:Puree and moderately thick liquids    SWALLOW SAFETY PRECAUTIONS: 1:1 assistance with ALL PO intake, STRICT aspiration and GERD precautions, monitor pt closely for s/s aspiration, meds crushed if able in applesauce or pudding, FEED ONLY IF AWAKE AND ALERT.      ASSESSMENT :  Based on the objective data described below, the patient presents with mild-moderate oropharyngeal dysphagia c/b weakness. Silent penetration to the vocal cords w/ thin and mildly thick. No aspiration, however patient remains at risk.     Oral phase: Reduced bolus control w/ thins resulting in premature spillage over BOT. Reduced mastication w/ oral bolus fragmentation and pocketing left side. Patient able to clear w/ use of lingual sweep and verbal cues. Over-all delayed oral transit w/ thicker consistencies and delay in the swallow initiation. Reduced tongue base retraction. Swallow initiated at the level of the pyriforms.     Pharyngeal phase: Reduced hyolaryngeal excursion and protraction. Reduced epiglottic inversion for airway protection. Epiglottis slow to invert w/ incomplete inversion w/ thinner consistencies. Weight of consistency does aid in inversion.   There is silent penetration to the vocal cords w/ thin via tsp and mildly thick liquids via cup. Patient is cued to elicit throat clear. No aspiration observed. Questionable trace penetration w/ remaining consistencies, however may be pharyngeal residue from previous trials.   Trace residue in the pyriforms after the swallow.     UES: appears WFL.     Patient will benefit from skilled intervention to address the above 
without clinical indicators of aspiration given no cues within 7 day(s).      -Patient will participate in modified barium swallow study within 7 day(s).      -Patient will demonstrate understanding of swallow safety precautions and aspiration precautions, diet recs with no cues within 7day(s).    -Patient/caregiver goal: safely swallow           Outcome: Progressing        PLAN :  Recommendations and Planned Interventions:  DIET RECOMMENDATIONS: NPO and ice chips, meds via PEG,    SWALLOW SAFETY PRECAUTIONS: Strict aspiration precautions, strict oral care   Rec MBS to further assess oropharyngeal phase of swallow and r/o aspiration  Acute SLP Services: Yes, patient will be followed by speech-language pathology 5x/week to address goals. Patient's rehabilitation potential is considered to be Guarded.    Discharge Recommendations: SNF vs IRF      SUBJECTIVE:   Wife reports had FEES performed last week and it went well. Do not have records.     OBJECTIVE:   Recent MBS on 3/36/24 by Cheryl Barnett CCC-SLP:  Assessment:  Patient seen this date for Modified Barium Swallow Study (MBS), to objectively evaluate oropharyngeal swallow function for determination of most appropriate PO diet recommendation. Overall, patient presents with severe oropharyngeal dysphagia. Oral phase was significant for anterior and posterior loss of over half the bolus, delayed initiation of tongue motion, and a collection of residue on oral structures. Pharyngeal phase was c/b delayed initiation, decreased hyolaryngeal excursion, diminished pharyngeal contraction, reduced BOT retraction, and incomplete laryngeal vestibule closure. These deficits resulted in aspiration during and after the swallow with all consistencies (thin, nectar, honey, puree). Spontaneous cough was inconsistent and ineffective in clearing aspirated material. Significant vallecular and pyriform residue was also noted across trials, with increased residuals corresponding to 
SUMMARY:   Critical Behavior:  Orientation  Overall Orientation Status: Within Normal Limits  Orientation Level: Oriented X4  Cognition  Overall Cognitive Status: Exceptions  Following Commands: Follows one step commands consistently;Follows multistep commands with increased time  Attention Span: Appears intact  Memory: Appears intact  Insights: Decreased awareness of deficits  Initiation: Requires cues for some  Sequencing: Requires cues for some    Functional Mobility Training:  Bed Mobility:  Bed Mobility Training  Bed Mobility Training: Yes  Overall Level of Assistance: Maximum assistance;Assist X2;Additional time  Interventions: Verbal cues;Tactile cues;Manual cues;Visual cues  Rolling: Maximum assistance;Assist X2  Supine to Sit: Maximum assistance;Assist X2  Sit to Supine: Maximum assistance;Assist X2  Scooting: Maximum assistance;Assist X2;Additional time  Transfers:  Transfer Training  Transfer Training: No  Balance:  Balance  Sitting: Impaired  Sitting - Static: Poor (constant support);Prop sitting  Sitting - Dynamic: Poor (constant support);Prop sitting  Wheelchair Mobility:  Wheelchair Management  Wheelchair Management: No  Ambulation/Gait Training:    Gait  Gait Training: No    Therapeutic Exercises:   Performed with patient seated EOB, maxA, to maintain sitting balance; rest breaks between sets due to fatigue     All AROM on R side and PROM on L side:     EXERCISE   Sets   Reps   Active Active Assist   Passive Self ROM   Comments   Ankle Pumps 1 10 [x] [] [x] []    Quad Sets/Glut Sets   [] [] [] []    Hamstring Sets   [] [] [] []    Short Arc Quads   [] [] [] []    Heel Slides   [] [] [] []    Straight Leg Raises   [] [] [] []    Hip abd/add   [] [] [] []    Long Arc Quads 1 10 [x] [] [x] []    Marching 1 10 [x] [] [x] []    Seated HR/TR   [] [] [] []       [] [] [] []       Pain Ratin/10 reported    Activity Tolerance:   Fair , requires frequent rest breaks, observed shortness of breath on 
Within Normal Limits  Orientation Level: Oriented X4  Cognition  Overall Cognitive Status: Exceptions  Following Commands: Follows one step commands consistently;Follows multistep commands with increased time  Attention Span: Appears intact  Memory: Appears intact  Insights: Decreased awareness of deficits  Initiation: Requires cues for some  Sequencing: Requires cues for some    Functional Mobility and Transfers for ADLs:  Bed Mobility:  Bed Mobility Training  Bed Mobility Training: Yes  Overall Level of Assistance: Maximum assistance;Assist X2;Additional time  Interventions: Verbal cues;Tactile cues;Manual cues;Visual cues  Rolling: Maximum assistance;Assist X2  Supine to Sit: Maximum assistance;Assist X2  Sit to Supine: Maximum assistance;Assist X2  Scooting: Maximum assistance;Assist X2;Additional time    Transfers:  Transfer Training  Transfer Training: No    Balance:  Balance  Sitting: Impaired  Sitting - Static: Poor (constant support);Prop sitting  Sitting - Dynamic: Poor (constant support);Prop sitting    Therapeutic exercise:    Exercise Sets Reps AROM AAROM PROM Self PROM Comments   Shoulder flex/ext 1 10 [x] [] [x] [] PROM to LUE   Elbow flex/ext 1 10 [x] [] [x] [] PROM to LUE   Wrist flex/ext 1 10 [x] [] [x] [] PROM to LUE   Digit flex/ext 1 10 [x] [] [x] [] PROM to LUE     Pain Ratin/10   Activity Tolerance:   Fair  and requires rest breaks    After treatment patient left in no apparent distress:   Bed locked and returned to lowest position, Patient left in no apparent distress in bed, Call bell within reach, Bed/ chair alarm activated, Caregiver / family present, Side rails x3, Heels elevated for pressure relief, and Podus boots applied for pressure relief, and nsg updated     COMMUNICATION/EDUCATION:   The patient’s plan of care was discussed with: Physical therapy assistant and Registered nurse  Co treatment provided with PTA this date for increased clinician and patient safety due to 
2236 by Tiffanie Billy, RN  Outcome: Progressing  Flowsheets (Taken 4/13/2024 2200)  Absence of Physical Injury: Implement safety measures based on patient assessment

## 2024-04-23 ENCOUNTER — HOSPITAL ENCOUNTER (OUTPATIENT)
Facility: HOSPITAL | Age: 69
Setting detail: SPECIMEN
Discharge: HOME | End: 2024-04-26

## 2024-04-23 LAB
ALBUMIN SERPL-MCNC: 2.8 G/DL (ref 3.5–5)
ALBUMIN/GLOB SERPL: 0.5 (ref 1.1–2.2)
ALP SERPL-CCNC: 255 U/L (ref 45–117)
ALT SERPL-CCNC: 89 U/L (ref 12–78)
ANION GAP SERPL CALC-SCNC: 8 MMOL/L (ref 5–15)
AST SERPL W P-5'-P-CCNC: 76 U/L (ref 15–37)
BASOPHILS # BLD: 0.1 K/UL (ref 0–0.1)
BASOPHILS # BLD: 0.1 K/UL (ref 0–0.1)
BASOPHILS NFR BLD: 1 % (ref 0–1)
BASOPHILS NFR BLD: 1 % (ref 0–1)
BILIRUB SERPL-MCNC: 1.6 MG/DL (ref 0.2–1)
BUN SERPL-MCNC: 20 MG/DL (ref 6–20)
BUN/CREAT SERPL: 19 (ref 12–20)
CA-I BLD-MCNC: 9.8 MG/DL (ref 8.5–10.1)
CHLORIDE SERPL-SCNC: 106 MMOL/L (ref 97–108)
CO2 SERPL-SCNC: 20 MMOL/L (ref 21–32)
CREAT SERPL-MCNC: 1.07 MG/DL (ref 0.7–1.3)
DIFFERENTIAL METHOD BLD: ABNORMAL
DIFFERENTIAL METHOD BLD: ABNORMAL
EOSINOPHIL # BLD: 0.1 K/UL (ref 0–0.4)
EOSINOPHIL # BLD: 0.1 K/UL (ref 0–0.4)
EOSINOPHIL NFR BLD: 2 % (ref 0–7)
EOSINOPHIL NFR BLD: 2 % (ref 0–7)
ERYTHROCYTE [DISTWIDTH] IN BLOOD BY AUTOMATED COUNT: 14.5 % (ref 11.5–14.5)
ERYTHROCYTE [DISTWIDTH] IN BLOOD BY AUTOMATED COUNT: 14.6 % (ref 11.5–14.5)
GLOBULIN SER CALC-MCNC: 5.1 G/DL (ref 2–4)
GLUCOSE SERPL-MCNC: 209 MG/DL (ref 65–100)
HCT VFR BLD AUTO: 41 % (ref 36.6–50.3)
HCT VFR BLD AUTO: 42.6 % (ref 36.6–50.3)
HGB BLD-MCNC: 13.6 G/DL (ref 12.1–17)
HGB BLD-MCNC: 14.7 G/DL (ref 12.1–17)
IMM GRANULOCYTES # BLD AUTO: 0 K/UL (ref 0–0.04)
IMM GRANULOCYTES # BLD AUTO: 0 K/UL (ref 0–0.04)
IMM GRANULOCYTES NFR BLD AUTO: 1 % (ref 0–0.5)
IMM GRANULOCYTES NFR BLD AUTO: 1 % (ref 0–0.5)
LYMPHOCYTES # BLD: 1.2 K/UL (ref 0.8–3.5)
LYMPHOCYTES # BLD: 1.6 K/UL (ref 0.8–3.5)
LYMPHOCYTES NFR BLD: 19 % (ref 12–49)
LYMPHOCYTES NFR BLD: 20 % (ref 12–49)
MCH RBC QN AUTO: 32 PG (ref 26–34)
MCH RBC QN AUTO: 32.7 PG (ref 26–34)
MCHC RBC AUTO-ENTMCNC: 33.2 G/DL (ref 30–36.5)
MCHC RBC AUTO-ENTMCNC: 34.5 G/DL (ref 30–36.5)
MCV RBC AUTO: 94.9 FL (ref 80–99)
MCV RBC AUTO: 96.5 FL (ref 80–99)
MONOCYTES # BLD: 0.5 K/UL (ref 0–1)
MONOCYTES # BLD: 0.6 K/UL (ref 0–1)
MONOCYTES NFR BLD: 7 % (ref 5–13)
MONOCYTES NFR BLD: 8 % (ref 5–13)
NEUTS SEG # BLD: 4.6 K/UL (ref 1.8–8)
NEUTS SEG # BLD: 5.3 K/UL (ref 1.8–8)
NEUTS SEG NFR BLD: 68 % (ref 32–75)
NEUTS SEG NFR BLD: 70 % (ref 32–75)
NRBC # BLD: 0 K/UL (ref 0–0.01)
NRBC # BLD: 0 K/UL (ref 0–0.01)
NRBC BLD-RTO: 0 PER 100 WBC
NRBC BLD-RTO: 0 PER 100 WBC
PLATELET # BLD AUTO: 69 K/UL (ref 150–400)
PLATELET # BLD AUTO: ABNORMAL K/UL (ref 150–400)
POTASSIUM SERPL-SCNC: 4.4 MMOL/L (ref 3.5–5.1)
PROT SERPL-MCNC: 7.9 G/DL (ref 6.4–8.2)
RBC # BLD AUTO: 4.25 M/UL (ref 4.1–5.7)
RBC # BLD AUTO: 4.49 M/UL (ref 4.1–5.7)
SODIUM SERPL-SCNC: 134 MMOL/L (ref 136–145)
WBC # BLD AUTO: 6.5 K/UL (ref 4.1–11.1)
WBC # BLD AUTO: 7.7 K/UL (ref 4.1–11.1)

## 2024-04-23 PROCEDURE — 80053 COMPREHEN METABOLIC PANEL: CPT

## 2024-04-23 PROCEDURE — 85025 COMPLETE CBC W/AUTO DIFF WBC: CPT

## 2024-04-25 ENCOUNTER — HOSPITAL ENCOUNTER (OUTPATIENT)
Facility: HOSPITAL | Age: 69
Setting detail: SPECIMEN
Discharge: HOME | End: 2024-04-28

## 2024-04-25 LAB
BASOPHILS # BLD: 0.1 K/UL (ref 0–0.1)
BASOPHILS NFR BLD: 1 % (ref 0–1)
DIFFERENTIAL METHOD BLD: ABNORMAL
EOSINOPHIL # BLD: 0.1 K/UL (ref 0–0.4)
EOSINOPHIL NFR BLD: 3 % (ref 0–7)
ERYTHROCYTE [DISTWIDTH] IN BLOOD BY AUTOMATED COUNT: 14.4 % (ref 11.5–14.5)
HCT VFR BLD AUTO: 38.8 % (ref 36.6–50.3)
HGB BLD-MCNC: 13.3 G/DL (ref 12.1–17)
IMM GRANULOCYTES # BLD AUTO: 0 K/UL (ref 0–0.04)
IMM GRANULOCYTES NFR BLD AUTO: 0 % (ref 0–0.5)
LYMPHOCYTES # BLD: 1.5 K/UL (ref 0.8–3.5)
LYMPHOCYTES NFR BLD: 29 % (ref 12–49)
MCH RBC QN AUTO: 32.4 PG (ref 26–34)
MCHC RBC AUTO-ENTMCNC: 34.3 G/DL (ref 30–36.5)
MCV RBC AUTO: 94.6 FL (ref 80–99)
MONOCYTES # BLD: 0.5 K/UL (ref 0–1)
MONOCYTES NFR BLD: 10 % (ref 5–13)
NEUTS SEG # BLD: 2.9 K/UL (ref 1.8–8)
NEUTS SEG NFR BLD: 57 % (ref 32–75)
NRBC # BLD: 0 K/UL (ref 0–0.01)
NRBC BLD-RTO: 0 PER 100 WBC
PLATELET # BLD AUTO: 53 K/UL (ref 150–400)
PMV BLD AUTO: 12.2 FL (ref 8.9–12.9)
RBC # BLD AUTO: 4.1 M/UL (ref 4.1–5.7)
WBC # BLD AUTO: 5.1 K/UL (ref 4.1–11.1)

## 2024-04-25 PROCEDURE — 85025 COMPLETE CBC W/AUTO DIFF WBC: CPT

## 2024-04-26 ENCOUNTER — HOSPITAL ENCOUNTER (OUTPATIENT)
Facility: HOSPITAL | Age: 69
Setting detail: SPECIMEN
Discharge: HOME | End: 2024-04-29

## 2024-04-26 PROCEDURE — 85025 COMPLETE CBC W/AUTO DIFF WBC: CPT

## 2024-04-26 PROCEDURE — 82140 ASSAY OF AMMONIA: CPT

## 2024-04-27 LAB
AMMONIA PLAS-SCNC: 45 UMOL/L
BASOPHILS # BLD: 0.1 K/UL (ref 0–0.1)
BASOPHILS NFR BLD: 1 % (ref 0–1)
DIFFERENTIAL METHOD BLD: ABNORMAL
EOSINOPHIL # BLD: 0.1 K/UL (ref 0–0.4)
EOSINOPHIL NFR BLD: 3 % (ref 0–7)
ERYTHROCYTE [DISTWIDTH] IN BLOOD BY AUTOMATED COUNT: 13.9 % (ref 11.5–14.5)
HCT VFR BLD AUTO: 38 % (ref 36.6–50.3)
HGB BLD-MCNC: 13.1 G/DL (ref 12.1–17)
IMM GRANULOCYTES # BLD AUTO: 0 K/UL (ref 0–0.04)
IMM GRANULOCYTES NFR BLD AUTO: 0 % (ref 0–0.5)
LYMPHOCYTES # BLD: 1.7 K/UL (ref 0.8–3.5)
LYMPHOCYTES NFR BLD: 34 % (ref 12–49)
MCH RBC QN AUTO: 32.6 PG (ref 26–34)
MCHC RBC AUTO-ENTMCNC: 34.5 G/DL (ref 30–36.5)
MCV RBC AUTO: 94.5 FL (ref 80–99)
MONOCYTES # BLD: 0.5 K/UL (ref 0–1)
MONOCYTES NFR BLD: 10 % (ref 5–13)
NEUTS SEG # BLD: 2.5 K/UL (ref 1.8–8)
NEUTS SEG NFR BLD: 52 % (ref 32–75)
NRBC # BLD: 0 K/UL (ref 0–0.01)
NRBC BLD-RTO: 0 PER 100 WBC
PLATELET # BLD AUTO: 40 K/UL (ref 150–400)
PMV BLD AUTO: 12 FL (ref 8.9–12.9)
RBC # BLD AUTO: 4.02 M/UL (ref 4.1–5.7)
RBC MORPH BLD: ABNORMAL
RBC MORPH BLD: ABNORMAL
WBC # BLD AUTO: 4.9 K/UL (ref 4.1–11.1)

## 2024-04-28 ENCOUNTER — HOSPITAL ENCOUNTER (OUTPATIENT)
Facility: HOSPITAL | Age: 69
Setting detail: SPECIMEN
Discharge: HOME | End: 2024-05-01

## 2024-04-28 LAB
ANION GAP SERPL CALC-SCNC: 12 MMOL/L (ref 5–15)
BASOPHILS # BLD: 0.1 K/UL (ref 0–0.1)
BASOPHILS NFR BLD: 1 % (ref 0–1)
BUN SERPL-MCNC: 31 MG/DL (ref 6–20)
BUN/CREAT SERPL: 19 (ref 12–20)
CA-I BLD-MCNC: 8.4 MG/DL (ref 8.5–10.1)
CHLORIDE SERPL-SCNC: 104 MMOL/L (ref 97–108)
CO2 SERPL-SCNC: 23 MMOL/L (ref 21–32)
CREAT SERPL-MCNC: 1.64 MG/DL (ref 0.7–1.3)
DIFFERENTIAL METHOD BLD: ABNORMAL
EOSINOPHIL # BLD: 0.2 K/UL (ref 0–0.4)
EOSINOPHIL NFR BLD: 1 % (ref 0–7)
ERYTHROCYTE [DISTWIDTH] IN BLOOD BY AUTOMATED COUNT: 18 % (ref 11.5–14.5)
GLUCOSE SERPL-MCNC: 77 MG/DL (ref 65–100)
HCT VFR BLD AUTO: 31.7 % (ref 36.6–50.3)
HGB BLD-MCNC: 10.4 G/DL (ref 12.1–17)
IMM GRANULOCYTES # BLD AUTO: 0.1 K/UL (ref 0–0.04)
IMM GRANULOCYTES NFR BLD AUTO: 1 % (ref 0–0.5)
LYMPHOCYTES # BLD: 1.7 K/UL (ref 0.8–3.5)
LYMPHOCYTES NFR BLD: 15 % (ref 12–49)
MCH RBC QN AUTO: 31.7 PG (ref 26–34)
MCHC RBC AUTO-ENTMCNC: 32.8 G/DL (ref 30–36.5)
MCV RBC AUTO: 96.6 FL (ref 80–99)
MONOCYTES # BLD: 1 K/UL (ref 0–1)
MONOCYTES NFR BLD: 9 % (ref 5–13)
NEUTS SEG # BLD: 8.6 K/UL (ref 1.8–8)
NEUTS SEG NFR BLD: 73 % (ref 32–75)
NRBC # BLD: 0 K/UL (ref 0–0.01)
NRBC BLD-RTO: 0 PER 100 WBC
PLATELET # BLD AUTO: 162 K/UL (ref 150–400)
PMV BLD AUTO: 10 FL (ref 8.9–12.9)
POTASSIUM SERPL-SCNC: 3 MMOL/L (ref 3.5–5.1)
RBC # BLD AUTO: 3.28 M/UL (ref 4.1–5.7)
SODIUM SERPL-SCNC: 139 MMOL/L (ref 136–145)
WBC # BLD AUTO: 11.6 K/UL (ref 4.1–11.1)

## 2024-04-28 PROCEDURE — 85025 COMPLETE CBC W/AUTO DIFF WBC: CPT

## 2024-04-28 PROCEDURE — 80048 BASIC METABOLIC PNL TOTAL CA: CPT

## 2024-04-30 ENCOUNTER — HOSPITAL ENCOUNTER (OUTPATIENT)
Facility: HOSPITAL | Age: 69
Setting detail: SPECIMEN
Discharge: HOME | End: 2024-05-03

## 2024-04-30 LAB
AMMONIA PLAS-SCNC: 48 UMOL/L
BASOPHILS # BLD: 0.1 K/UL (ref 0–0.1)
BASOPHILS NFR BLD: 1 % (ref 0–1)
DIFFERENTIAL METHOD BLD: ABNORMAL
EOSINOPHIL # BLD: 0.1 K/UL (ref 0–0.4)
EOSINOPHIL NFR BLD: 2 % (ref 0–7)
ERYTHROCYTE [DISTWIDTH] IN BLOOD BY AUTOMATED COUNT: 14 % (ref 11.5–14.5)
HCT VFR BLD AUTO: 36.8 % (ref 36.6–50.3)
HGB BLD-MCNC: 12.4 G/DL (ref 12.1–17)
IMM GRANULOCYTES # BLD AUTO: 0.1 K/UL (ref 0–0.04)
IMM GRANULOCYTES NFR BLD AUTO: 1 % (ref 0–0.5)
LYMPHOCYTES # BLD: 1.4 K/UL (ref 0.8–3.5)
LYMPHOCYTES NFR BLD: 16 % (ref 12–49)
MCH RBC QN AUTO: 32.5 PG (ref 26–34)
MCHC RBC AUTO-ENTMCNC: 33.7 G/DL (ref 30–36.5)
MCV RBC AUTO: 96.3 FL (ref 80–99)
MONOCYTES # BLD: 1 K/UL (ref 0–1)
MONOCYTES NFR BLD: 12 % (ref 5–13)
NEUTS SEG # BLD: 6.1 K/UL (ref 1.8–8)
NEUTS SEG NFR BLD: 68 % (ref 32–75)
NRBC # BLD: 0 K/UL (ref 0–0.01)
NRBC BLD-RTO: 0 PER 100 WBC
PLATELET # BLD AUTO: 42 K/UL (ref 150–400)
PMV BLD AUTO: 12.1 FL (ref 8.9–12.9)
RBC # BLD AUTO: 3.82 M/UL (ref 4.1–5.7)
WBC # BLD AUTO: 8.7 K/UL (ref 4.1–11.1)

## 2024-04-30 PROCEDURE — 82140 ASSAY OF AMMONIA: CPT

## 2024-04-30 PROCEDURE — 85025 COMPLETE CBC W/AUTO DIFF WBC: CPT

## 2024-05-03 ENCOUNTER — HOSPITAL ENCOUNTER (OUTPATIENT)
Facility: HOSPITAL | Age: 69
Setting detail: SPECIMEN
Discharge: HOME | End: 2024-05-06

## 2024-05-03 LAB
BASOPHILS # BLD: 0 K/UL (ref 0–0.1)
BASOPHILS NFR BLD: 0 % (ref 0–1)
DIFFERENTIAL METHOD BLD: ABNORMAL
EOSINOPHIL # BLD: 0.3 K/UL (ref 0–0.4)
EOSINOPHIL NFR BLD: 5 % (ref 0–7)
ERYTHROCYTE [DISTWIDTH] IN BLOOD BY AUTOMATED COUNT: 13.5 % (ref 11.5–14.5)
HCT VFR BLD AUTO: 37.7 % (ref 36.6–50.3)
HGB BLD-MCNC: 12.7 G/DL (ref 12.1–17)
IMM GRANULOCYTES # BLD AUTO: 0 K/UL (ref 0–0.04)
IMM GRANULOCYTES NFR BLD AUTO: 1 % (ref 0–0.5)
LYMPHOCYTES # BLD: 1 K/UL (ref 0.8–3.5)
LYMPHOCYTES NFR BLD: 19 % (ref 12–49)
MCH RBC QN AUTO: 32.5 PG (ref 26–34)
MCHC RBC AUTO-ENTMCNC: 33.7 G/DL (ref 30–36.5)
MCV RBC AUTO: 96.4 FL (ref 80–99)
MONOCYTES # BLD: 0.4 K/UL (ref 0–1)
MONOCYTES NFR BLD: 8 % (ref 5–13)
NEUTS SEG # BLD: 3.6 K/UL (ref 1.8–8)
NEUTS SEG NFR BLD: 67 % (ref 32–75)
NRBC # BLD: 0 K/UL (ref 0–0.01)
NRBC BLD-RTO: 0 PER 100 WBC
PLATELET # BLD AUTO: 74 K/UL (ref 150–400)
RBC # BLD AUTO: 3.91 M/UL (ref 4.1–5.7)
WBC # BLD AUTO: 5.3 K/UL (ref 4.1–11.1)

## 2024-05-03 PROCEDURE — 85025 COMPLETE CBC W/AUTO DIFF WBC: CPT

## 2024-05-24 ENCOUNTER — APPOINTMENT (OUTPATIENT)
Facility: HOSPITAL | Age: 69
DRG: 690 | End: 2024-05-24
Attending: EMERGENCY MEDICINE
Payer: MEDICARE

## 2024-05-24 ENCOUNTER — HOSPITAL ENCOUNTER (INPATIENT)
Facility: HOSPITAL | Age: 69
LOS: 13 days | Discharge: HOSPICE | DRG: 690 | End: 2024-06-06
Attending: EMERGENCY MEDICINE | Admitting: INTERNAL MEDICINE
Payer: MEDICARE

## 2024-05-24 DIAGNOSIS — R65.10 SIRS (SYSTEMIC INFLAMMATORY RESPONSE SYNDROME) (HCC): Primary | ICD-10-CM

## 2024-05-24 DIAGNOSIS — E87.20 METABOLIC ACIDOSIS: ICD-10-CM

## 2024-05-24 DIAGNOSIS — N30.00 ACUTE CYSTITIS WITHOUT HEMATURIA: ICD-10-CM

## 2024-05-24 PROBLEM — N39.0 UTI (URINARY TRACT INFECTION): Status: ACTIVE | Noted: 2024-05-24

## 2024-05-24 LAB
ALBUMIN SERPL-MCNC: 2.9 G/DL (ref 3.5–5)
ALBUMIN/GLOB SERPL: 0.6 (ref 1.1–2.2)
ALP SERPL-CCNC: 161 U/L (ref 45–117)
ALT SERPL-CCNC: 51 U/L (ref 12–78)
ANION GAP SERPL CALC-SCNC: 11 MMOL/L (ref 5–15)
APPEARANCE UR: ABNORMAL
AST SERPL W P-5'-P-CCNC: 36 U/L (ref 15–37)
BACTERIA URNS QL MICRO: NEGATIVE /HPF
BASOPHILS # BLD: 0.1 K/UL (ref 0–0.1)
BASOPHILS NFR BLD: 1 % (ref 0–1)
BILIRUB SERPL-MCNC: 1.4 MG/DL (ref 0.2–1)
BILIRUB UR QL: NEGATIVE
BUN SERPL-MCNC: 27 MG/DL (ref 6–20)
BUN/CREAT SERPL: 33 (ref 12–20)
CA-I BLD-MCNC: 10 MG/DL (ref 8.5–10.1)
CHLORIDE SERPL-SCNC: 106 MMOL/L (ref 97–108)
CO2 SERPL-SCNC: 19 MMOL/L (ref 21–32)
COLOR UR: ABNORMAL
CREAT SERPL-MCNC: 0.82 MG/DL (ref 0.7–1.3)
DIFFERENTIAL METHOD BLD: ABNORMAL
EOSINOPHIL # BLD: 0.1 K/UL (ref 0–0.4)
EOSINOPHIL NFR BLD: 1 % (ref 0–7)
EPITH CASTS URNS QL MICRO: ABNORMAL /LPF
ERYTHROCYTE [DISTWIDTH] IN BLOOD BY AUTOMATED COUNT: 14.1 % (ref 11.5–14.5)
GLOBULIN SER CALC-MCNC: 4.9 G/DL (ref 2–4)
GLUCOSE BLD STRIP.AUTO-MCNC: 319 MG/DL (ref 65–100)
GLUCOSE BLD STRIP.AUTO-MCNC: 322 MG/DL (ref 65–100)
GLUCOSE SERPL-MCNC: 298 MG/DL (ref 65–100)
GLUCOSE UR STRIP.AUTO-MCNC: >500 MG/DL
HCT VFR BLD AUTO: 40.4 % (ref 36.6–50.3)
HGB BLD-MCNC: 13.9 G/DL (ref 12.1–17)
HGB UR QL STRIP: ABNORMAL
IMM GRANULOCYTES # BLD AUTO: 0 K/UL (ref 0–0.04)
IMM GRANULOCYTES NFR BLD AUTO: 1 % (ref 0–0.5)
KETONES UR QL STRIP.AUTO: 5 MG/DL
LACTATE BLD-SCNC: 1.91 MMOL/L (ref 0.4–2)
LEUKOCYTE ESTERASE UR QL STRIP.AUTO: ABNORMAL
LIPASE SERPL-CCNC: 25 U/L (ref 13–75)
LYMPHOCYTES # BLD: 1.4 K/UL (ref 0.8–3.5)
LYMPHOCYTES NFR BLD: 16 % (ref 12–49)
MCH RBC QN AUTO: 31.4 PG (ref 26–34)
MCHC RBC AUTO-ENTMCNC: 34.4 G/DL (ref 30–36.5)
MCV RBC AUTO: 91.2 FL (ref 80–99)
MONOCYTES # BLD: 0.6 K/UL (ref 0–1)
MONOCYTES NFR BLD: 7 % (ref 5–13)
MUCOUS THREADS URNS QL MICRO: ABNORMAL /LPF
NEUTS SEG # BLD: 6.6 K/UL (ref 1.8–8)
NEUTS SEG NFR BLD: 74 % (ref 32–75)
NITRITE UR QL STRIP.AUTO: POSITIVE
NRBC # BLD: 0 K/UL (ref 0–0.01)
NRBC BLD-RTO: 0 PER 100 WBC
PERFORMED BY:: ABNORMAL
PERFORMED BY:: ABNORMAL
PH UR STRIP: 6 (ref 5–8)
PLATELET # BLD AUTO: 129 K/UL (ref 150–400)
PMV BLD AUTO: 12.4 FL (ref 8.9–12.9)
POTASSIUM SERPL-SCNC: 4 MMOL/L (ref 3.5–5.1)
PROT SERPL-MCNC: 7.8 G/DL (ref 6.4–8.2)
PROT UR STRIP-MCNC: NEGATIVE MG/DL
RBC # BLD AUTO: 4.43 M/UL (ref 4.1–5.7)
RBC #/AREA URNS HPF: ABNORMAL /HPF (ref 0–5)
SODIUM SERPL-SCNC: 136 MMOL/L (ref 136–145)
SP GR UR REFRACTOMETRY: 1.01 (ref 1–1.03)
SPECIMEN SITE: ABNORMAL
URINE CULTURE IF INDICATED: ABNORMAL
UROBILINOGEN UR QL STRIP.AUTO: 0.1 EU/DL (ref 0.1–1)
WBC # BLD AUTO: 8.8 K/UL (ref 4.1–11.1)
WBC URNS QL MICRO: ABNORMAL /HPF (ref 0–4)

## 2024-05-24 PROCEDURE — 6370000000 HC RX 637 (ALT 250 FOR IP): Performed by: INTERNAL MEDICINE

## 2024-05-24 PROCEDURE — 70498 CT ANGIOGRAPHY NECK: CPT

## 2024-05-24 PROCEDURE — 83605 ASSAY OF LACTIC ACID: CPT

## 2024-05-24 PROCEDURE — 2580000003 HC RX 258: Performed by: INTERNAL MEDICINE

## 2024-05-24 PROCEDURE — 96361 HYDRATE IV INFUSION ADD-ON: CPT

## 2024-05-24 PROCEDURE — 74177 CT ABD & PELVIS W/CONTRAST: CPT

## 2024-05-24 PROCEDURE — 6360000002 HC RX W HCPCS: Performed by: INTERNAL MEDICINE

## 2024-05-24 PROCEDURE — 6360000002 HC RX W HCPCS: Performed by: EMERGENCY MEDICINE

## 2024-05-24 PROCEDURE — 81001 URINALYSIS AUTO W/SCOPE: CPT

## 2024-05-24 PROCEDURE — 96365 THER/PROPH/DIAG IV INF INIT: CPT

## 2024-05-24 PROCEDURE — 2580000003 HC RX 258: Performed by: EMERGENCY MEDICINE

## 2024-05-24 PROCEDURE — 83690 ASSAY OF LIPASE: CPT

## 2024-05-24 PROCEDURE — 96366 THER/PROPH/DIAG IV INF ADDON: CPT

## 2024-05-24 PROCEDURE — 1100000000 HC RM PRIVATE

## 2024-05-24 PROCEDURE — 80047 BASIC METABLC PNL IONIZED CA: CPT

## 2024-05-24 PROCEDURE — 6360000004 HC RX CONTRAST MEDICATION: Performed by: INTERNAL MEDICINE

## 2024-05-24 PROCEDURE — 6370000000 HC RX 637 (ALT 250 FOR IP): Performed by: STUDENT IN AN ORGANIZED HEALTH CARE EDUCATION/TRAINING PROGRAM

## 2024-05-24 PROCEDURE — 80053 COMPREHEN METABOLIC PANEL: CPT

## 2024-05-24 PROCEDURE — 87086 URINE CULTURE/COLONY COUNT: CPT

## 2024-05-24 PROCEDURE — 6360000004 HC RX CONTRAST MEDICATION: Performed by: EMERGENCY MEDICINE

## 2024-05-24 PROCEDURE — 85025 COMPLETE CBC W/AUTO DIFF WBC: CPT

## 2024-05-24 PROCEDURE — 36415 COLL VENOUS BLD VENIPUNCTURE: CPT

## 2024-05-24 PROCEDURE — 82962 GLUCOSE BLOOD TEST: CPT

## 2024-05-24 PROCEDURE — 99285 EMERGENCY DEPT VISIT HI MDM: CPT

## 2024-05-24 PROCEDURE — 99222 1ST HOSP IP/OBS MODERATE 55: CPT | Performed by: STUDENT IN AN ORGANIZED HEALTH CARE EDUCATION/TRAINING PROGRAM

## 2024-05-24 RX ORDER — ONDANSETRON 2 MG/ML
4 INJECTION INTRAMUSCULAR; INTRAVENOUS EVERY 6 HOURS PRN
Status: DISCONTINUED | OUTPATIENT
Start: 2024-05-24 | End: 2024-05-24

## 2024-05-24 RX ORDER — LIDOCAINE 50 MG/G
1 PATCH TOPICAL DAILY
COMMUNITY

## 2024-05-24 RX ORDER — DORZOLAMIDE HCL 20 MG/ML
1 SOLUTION/ DROPS OPHTHALMIC 2 TIMES DAILY
Status: DISCONTINUED | OUTPATIENT
Start: 2024-05-24 | End: 2024-05-29

## 2024-05-24 RX ORDER — ACETAMINOPHEN 325 MG/1
650 TABLET ORAL EVERY 6 HOURS PRN
Status: DISCONTINUED | OUTPATIENT
Start: 2024-05-24 | End: 2024-05-24

## 2024-05-24 RX ORDER — ACETAMINOPHEN 325 MG/1
650 TABLET ORAL EVERY 6 HOURS PRN
Status: DISCONTINUED | OUTPATIENT
Start: 2024-05-24 | End: 2024-06-06 | Stop reason: HOSPADM

## 2024-05-24 RX ORDER — INSULIN LISPRO 100 [IU]/ML
10 INJECTION, SOLUTION INTRAVENOUS; SUBCUTANEOUS ONCE
Status: COMPLETED | OUTPATIENT
Start: 2024-05-24 | End: 2024-05-24

## 2024-05-24 RX ORDER — ONDANSETRON 2 MG/ML
4 INJECTION INTRAMUSCULAR; INTRAVENOUS EVERY 6 HOURS PRN
Status: DISCONTINUED | OUTPATIENT
Start: 2024-05-24 | End: 2024-06-06 | Stop reason: HOSPADM

## 2024-05-24 RX ORDER — ONDANSETRON 4 MG/1
4 TABLET, ORALLY DISINTEGRATING ORAL EVERY 8 HOURS PRN
Status: DISCONTINUED | OUTPATIENT
Start: 2024-05-24 | End: 2024-05-24

## 2024-05-24 RX ORDER — ACETAMINOPHEN 500 MG
500 TABLET ORAL EVERY 6 HOURS PRN
COMMUNITY

## 2024-05-24 RX ORDER — SENNA AND DOCUSATE SODIUM 50; 8.6 MG/1; MG/1
2 TABLET, FILM COATED ORAL DAILY PRN
Status: DISCONTINUED | OUTPATIENT
Start: 2024-05-24 | End: 2024-05-24

## 2024-05-24 RX ORDER — ACETAMINOPHEN 650 MG/1
650 SUPPOSITORY RECTAL EVERY 6 HOURS PRN
Status: DISCONTINUED | OUTPATIENT
Start: 2024-05-24 | End: 2024-05-24

## 2024-05-24 RX ORDER — LISINOPRIL 10 MG/1
10 TABLET ORAL DAILY
Status: DISCONTINUED | OUTPATIENT
Start: 2024-05-24 | End: 2024-06-06 | Stop reason: HOSPADM

## 2024-05-24 RX ORDER — HYDROXYZINE HYDROCHLORIDE 25 MG/1
25 TABLET, FILM COATED ORAL EVERY 6 HOURS PRN
Status: DISCONTINUED | OUTPATIENT
Start: 2024-05-24 | End: 2024-06-06 | Stop reason: HOSPADM

## 2024-05-24 RX ORDER — HYDRALAZINE HYDROCHLORIDE 20 MG/ML
10 INJECTION INTRAMUSCULAR; INTRAVENOUS EVERY 4 HOURS PRN
Status: DISCONTINUED | OUTPATIENT
Start: 2024-05-24 | End: 2024-06-02

## 2024-05-24 RX ORDER — INSULIN GLARGINE 100 [IU]/ML
20 INJECTION, SOLUTION SUBCUTANEOUS NIGHTLY
Status: DISCONTINUED | OUTPATIENT
Start: 2024-05-24 | End: 2024-05-25

## 2024-05-24 RX ORDER — LANOLIN ALCOHOL/MO/W.PET/CERES
3 CREAM (GRAM) TOPICAL NIGHTLY PRN
Status: DISCONTINUED | OUTPATIENT
Start: 2024-05-24 | End: 2024-06-06 | Stop reason: HOSPADM

## 2024-05-24 RX ORDER — SODIUM CHLORIDE 9 MG/ML
INJECTION, SOLUTION INTRAVENOUS PRN
Status: DISCONTINUED | OUTPATIENT
Start: 2024-05-24 | End: 2024-06-06 | Stop reason: HOSPADM

## 2024-05-24 RX ORDER — POTASSIUM CHLORIDE 20 MEQ/1
40 TABLET, EXTENDED RELEASE ORAL PRN
Status: DISCONTINUED | OUTPATIENT
Start: 2024-05-24 | End: 2024-05-24

## 2024-05-24 RX ORDER — INSULIN LISPRO 100 [IU]/ML
0-8 INJECTION, SOLUTION INTRAVENOUS; SUBCUTANEOUS
Status: DISCONTINUED | OUTPATIENT
Start: 2024-05-24 | End: 2024-06-06 | Stop reason: HOSPADM

## 2024-05-24 RX ORDER — BISACODYL 10 MG
10 SUPPOSITORY, RECTAL RECTAL DAILY PRN
Status: DISCONTINUED | OUTPATIENT
Start: 2024-05-24 | End: 2024-06-06 | Stop reason: HOSPADM

## 2024-05-24 RX ORDER — FINASTERIDE 5 MG/1
5 TABLET, FILM COATED ORAL DAILY
Status: DISCONTINUED | OUTPATIENT
Start: 2024-05-24 | End: 2024-06-06 | Stop reason: HOSPADM

## 2024-05-24 RX ORDER — MAGNESIUM SULFATE IN WATER 40 MG/ML
2000 INJECTION, SOLUTION INTRAVENOUS PRN
Status: DISCONTINUED | OUTPATIENT
Start: 2024-05-24 | End: 2024-06-06 | Stop reason: HOSPADM

## 2024-05-24 RX ORDER — ASPIRIN 81 MG/1
81 TABLET, CHEWABLE ORAL DAILY
Status: DISCONTINUED | OUTPATIENT
Start: 2024-05-24 | End: 2024-06-06 | Stop reason: HOSPADM

## 2024-05-24 RX ORDER — ACETAMINOPHEN 650 MG/1
650 SUPPOSITORY RECTAL EVERY 6 HOURS PRN
Status: DISCONTINUED | OUTPATIENT
Start: 2024-05-24 | End: 2024-06-06 | Stop reason: HOSPADM

## 2024-05-24 RX ORDER — LATANOPROST 50 UG/ML
1 SOLUTION/ DROPS OPHTHALMIC NIGHTLY
Status: DISCONTINUED | OUTPATIENT
Start: 2024-05-24 | End: 2024-06-06 | Stop reason: HOSPADM

## 2024-05-24 RX ORDER — POTASSIUM CHLORIDE 7.45 MG/ML
10 INJECTION INTRAVENOUS PRN
Status: DISCONTINUED | OUTPATIENT
Start: 2024-05-24 | End: 2024-05-30

## 2024-05-24 RX ORDER — INSULIN LISPRO 100 [IU]/ML
0-4 INJECTION, SOLUTION INTRAVENOUS; SUBCUTANEOUS NIGHTLY
Status: DISCONTINUED | OUTPATIENT
Start: 2024-05-24 | End: 2024-06-06 | Stop reason: HOSPADM

## 2024-05-24 RX ORDER — CIPROFLOXACIN 2 MG/ML
400 INJECTION, SOLUTION INTRAVENOUS ONCE
Status: COMPLETED | OUTPATIENT
Start: 2024-05-24 | End: 2024-05-24

## 2024-05-24 RX ORDER — SODIUM CHLORIDE 0.9 % (FLUSH) 0.9 %
5-40 SYRINGE (ML) INJECTION EVERY 12 HOURS SCHEDULED
Status: DISCONTINUED | OUTPATIENT
Start: 2024-05-24 | End: 2024-06-06 | Stop reason: HOSPADM

## 2024-05-24 RX ORDER — 0.9 % SODIUM CHLORIDE 0.9 %
1000 INTRAVENOUS SOLUTION INTRAVENOUS ONCE
Status: COMPLETED | OUTPATIENT
Start: 2024-05-24 | End: 2024-05-24

## 2024-05-24 RX ORDER — SODIUM CHLORIDE 0.9 % (FLUSH) 0.9 %
5-40 SYRINGE (ML) INJECTION PRN
Status: DISCONTINUED | OUTPATIENT
Start: 2024-05-24 | End: 2024-06-06 | Stop reason: HOSPADM

## 2024-05-24 RX ORDER — POTASSIUM CHLORIDE 7.45 MG/ML
10 INJECTION INTRAVENOUS PRN
Status: DISCONTINUED | OUTPATIENT
Start: 2024-05-24 | End: 2024-05-24

## 2024-05-24 RX ORDER — ENOXAPARIN SODIUM 100 MG/ML
30 INJECTION SUBCUTANEOUS 2 TIMES DAILY
Status: DISCONTINUED | OUTPATIENT
Start: 2024-05-24 | End: 2024-06-02

## 2024-05-24 RX ORDER — TIMOLOL MALEATE 5 MG/ML
1 SOLUTION/ DROPS OPHTHALMIC 2 TIMES DAILY
Status: DISCONTINUED | OUTPATIENT
Start: 2024-05-24 | End: 2024-05-29

## 2024-05-24 RX ORDER — LABETALOL HYDROCHLORIDE 5 MG/ML
20 INJECTION, SOLUTION INTRAVENOUS ONCE
Status: DISCONTINUED | OUTPATIENT
Start: 2024-05-24 | End: 2024-06-02

## 2024-05-24 RX ORDER — ONDANSETRON 4 MG/1
4 TABLET, ORALLY DISINTEGRATING ORAL EVERY 8 HOURS PRN
Status: DISCONTINUED | OUTPATIENT
Start: 2024-05-24 | End: 2024-06-06 | Stop reason: HOSPADM

## 2024-05-24 RX ORDER — SODIUM CHLORIDE 9 MG/ML
INJECTION, SOLUTION INTRAVENOUS CONTINUOUS
Status: DISPENSED | OUTPATIENT
Start: 2024-05-24 | End: 2024-05-26

## 2024-05-24 RX ORDER — 0.9 % SODIUM CHLORIDE 0.9 %
500 INTRAVENOUS SOLUTION INTRAVENOUS ONCE
Status: COMPLETED | OUTPATIENT
Start: 2024-05-24 | End: 2024-05-24

## 2024-05-24 RX ORDER — LORAZEPAM 1 MG/1
1 TABLET ORAL DAILY
COMMUNITY

## 2024-05-24 RX ORDER — INSULIN LISPRO 100 [IU]/ML
16 INJECTION, SOLUTION INTRAVENOUS; SUBCUTANEOUS EVERY 4 HOURS
Status: DISCONTINUED | OUTPATIENT
Start: 2024-05-24 | End: 2024-05-24 | Stop reason: DRUGHIGH

## 2024-05-24 RX ORDER — DORZOLAMIDE HYDROCHLORIDE AND TIMOLOL MALEATE 20; 5 MG/ML; MG/ML
1 SOLUTION/ DROPS OPHTHALMIC 2 TIMES DAILY
Status: DISCONTINUED | OUTPATIENT
Start: 2024-05-24 | End: 2024-05-24 | Stop reason: CLARIF

## 2024-05-24 RX ORDER — SENNA AND DOCUSATE SODIUM 50; 8.6 MG/1; MG/1
2 TABLET, FILM COATED ORAL DAILY PRN
Status: DISCONTINUED | OUTPATIENT
Start: 2024-05-24 | End: 2024-06-06 | Stop reason: HOSPADM

## 2024-05-24 RX ORDER — TRAMADOL HYDROCHLORIDE 50 MG/1
50 TABLET ORAL 2 TIMES DAILY
COMMUNITY

## 2024-05-24 RX ADMIN — LISINOPRIL 10 MG: 10 TABLET ORAL at 18:12

## 2024-05-24 RX ADMIN — DORZOLAMIDE HYDROCHLORIDE 1 DROP: 20 SOLUTION/ DROPS OPHTHALMIC at 23:31

## 2024-05-24 RX ADMIN — ENOXAPARIN SODIUM 30 MG: 100 INJECTION SUBCUTANEOUS at 22:04

## 2024-05-24 RX ADMIN — METOPROLOL TARTRATE 25 MG: 25 TABLET, FILM COATED ORAL at 23:31

## 2024-05-24 RX ADMIN — CIPROFLOXACIN 400 MG: 400 INJECTION, SOLUTION INTRAVENOUS at 11:44

## 2024-05-24 RX ADMIN — SODIUM CHLORIDE 1000 ML: 9 INJECTION, SOLUTION INTRAVENOUS at 11:17

## 2024-05-24 RX ADMIN — CEFTRIAXONE SODIUM 1000 MG: 1 INJECTION, POWDER, FOR SOLUTION INTRAMUSCULAR; INTRAVENOUS at 15:57

## 2024-05-24 RX ADMIN — INSULIN LISPRO 10 UNITS: 100 INJECTION, SOLUTION INTRAVENOUS; SUBCUTANEOUS at 17:12

## 2024-05-24 RX ADMIN — SODIUM CHLORIDE 500 ML: 9 INJECTION, SOLUTION INTRAVENOUS at 09:35

## 2024-05-24 RX ADMIN — SODIUM CHLORIDE, PRESERVATIVE FREE 10 ML: 5 INJECTION INTRAVENOUS at 21:21

## 2024-05-24 RX ADMIN — LATANOPROST 1 DROP: 50 SOLUTION OPHTHALMIC at 23:32

## 2024-05-24 RX ADMIN — INSULIN LISPRO 6 UNITS: 100 INJECTION, SOLUTION INTRAVENOUS; SUBCUTANEOUS at 17:12

## 2024-05-24 RX ADMIN — SODIUM CHLORIDE: 9 INJECTION, SOLUTION INTRAVENOUS at 15:59

## 2024-05-24 RX ADMIN — INSULIN GLARGINE 20 UNITS: 100 INJECTION, SOLUTION SUBCUTANEOUS at 22:02

## 2024-05-24 RX ADMIN — TIMOLOL MALEATE 1 DROP: 5 SOLUTION OPHTHALMIC at 23:31

## 2024-05-24 RX ADMIN — HYDRALAZINE HYDROCHLORIDE 10 MG: 20 INJECTION, SOLUTION INTRAMUSCULAR; INTRAVENOUS at 18:22

## 2024-05-24 RX ADMIN — IOPAMIDOL 100 ML: 755 INJECTION, SOLUTION INTRAVENOUS at 15:36

## 2024-05-24 RX ADMIN — IOPAMIDOL 100 ML: 755 INJECTION, SOLUTION INTRAVENOUS at 10:56

## 2024-05-24 RX ADMIN — FINASTERIDE 5 MG: 5 TABLET, FILM COATED ORAL at 17:11

## 2024-05-24 ASSESSMENT — PAIN - FUNCTIONAL ASSESSMENT: PAIN_FUNCTIONAL_ASSESSMENT: 0-10

## 2024-05-24 ASSESSMENT — PAIN SCALES - WONG BAKER: WONGBAKER_NUMERICALRESPONSE: NO HURT

## 2024-05-24 ASSESSMENT — PAIN SCALES - GENERAL
PAINLEVEL_OUTOF10: 5
PAINLEVEL_OUTOF10: 0

## 2024-05-24 NOTE — CONSULTS
Urology Consult    Patient: Florentino Dalal MRN: [id number]  SSN: [id number]    YOB: 1955  Age: 68 y.o.  Sex: male          Date of Consultation:  May 24, 2024  Requesting Physician: Jose Frye MD  Reason for Consultation: Bilateral Hydronephrosis           Assessment/Plan:  Urinary Retention - secondary to BPH and severe constipation. Patient is currently urinating into a male wick with clear yellow urine in the bedside cannister. Will start finesteride 5mg daily. Flomax cannot be administered through a PEG tube.   Bilateral Hydronephrosis - secondary to urinary retention and full bladder at the time of the scan. No indication of cystoscopy with stent placement. Optimize bladder emptying  Hypospadias - congenital. Underwent attempted repair in infancy which failed. Family declined to have a catheter placed secondary to possibility of worsening the hypospadias.      History of Present Illness:  Patient is a 68 y.o. male admitted 5/24/2024 to the hospital for UTI (urinary tract infection) [N39.0].  He is a 68 year old male admitted for a urinary tract infection. Recent history of stroke with residual deficits. Was discharged from the hospital and has been in a facility since discharge. Presented with lower abdominal pain and diagnosed with a UTI. CT A/P showed severe constipation, urinary retention, and bilateral hydronephrosis. Urology consulted for assistance.       Past Medical History:  Allergies   Allergen Reactions    Statins Myalgia    Levetiracetam Other (See Comments)     agitation    Sulfa Antibiotics Hives      Prior to Admission medications    Medication Sig Start Date End Date Taking? Authorizing Provider   acetaminophen (TYLENOL) 500 MG tablet Take 1 tablet by mouth every 6 hours as needed for Pain   Yes Provider, MD Odell   traMADol (ULTRAM) 50 MG tablet Take 1 tablet by mouth 2 times daily. Max Daily Amount: 100 mg   Yes Odell Wisdom MD   lidocaine

## 2024-05-24 NOTE — ED NOTES
Pt family knows about the dissection and are not worried about that right now and states that the md that is aware of that is going to address it later MD dennis and talked to the family, wife is POA

## 2024-05-24 NOTE — ED PROVIDER NOTES
RBC, UA 5-10 0 - 5 /hpf    Epithelial Cells, UA Few Few /lpf    BACTERIA, URINE Negative Negative /hpf    Urine Culture if Indicated Urine Culture Ordered (A) Culture not indicated by UA result      Mucus, UA Trace (A) Negative /lpf       EKG:.Not Applicable    Radiologic Studies:  Non-plain film images such as CT, Ultrasound and MRI are read by the radiologist. Plain radiographic images are visualized and preliminarily interpreted by the ED Provider with the following findings: Not Applicable.    Interpretation per the Radiologist below, if available at the time of this note:  CT ABDOMEN PELVIS W IV CONTRAST Additional Contrast? None   Final Result   1. Bladder distention (~1.3 L). Mild secondary hydronephrosis and   ureterectasis.   2. Large stool ball in the rectum.   3. Cirrhosis and portal hypertension. No ascites.         CTA HEAD NECK W CONTRAST    (Results Pending)        ED COURSE and DIFFERENTIAL DIAGNOSIS/MDM   2:12 PM Differential and Considerations: will asses for acuta bd pain with Ct and basic labs    Records Reviewed (source and summary of external notes): Prior medical records and Nursing notes.    Vitals:    Vitals:    05/24/24 0929 05/24/24 0949 05/24/24 0959 05/24/24 1019   BP: (!) 156/111 (!) 164/118 (!) 158/107 (!) 154/105   Pulse:       Resp:       Temp:       TempSrc:       SpO2: 98% 97% 98% 98%   Weight:       Height:            ED COURSE  ED Course as of 05/24/24 1412   Fri May 24, 2024   0944 Patient has produced urine in his brief and was not captured.  Will obtain CT scan to delay more ominous acute abdominal pathology [CS]   1328 Patient has stool ball in the rectal vault and mild hydro likely secondary to urinary retention. [CS]   1346 Patient's family is asked for repeat CTA head neck from the neurosurgeon. [CS]   1348 CARBON DIOXIDE(!): 19 [CS]   1349 Further review of the patient's labs shows that he has a CO2 of 19 is a blood sugar of 300 and a BUN/creatinine ratio of 33 revealing

## 2024-05-24 NOTE — ED TRIAGE NOTES
Pt started with pains bilat lower abd pains yesterday hx of stroke left sided weakness, peg tube feedings daily

## 2024-05-24 NOTE — H&P
History & Physical    Primary Care Provider: Larry Johnson Jr., MD  Source of Information: Patient/family     Chief complaint:   Chief Complaint   Patient presents with    Abdominal Pain    Back Pain        History of Presenting Illness:   Florentino Dalal is a 68 y.o. 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  Presents to the ED accompanied by wife and son with reports of lower abdominal pain.  No nausea or vomiting.  Had constipation for several days prior to presenting.  He has had limited oral intake secondary to poor appetite.  This resulted after last hemorrhagic stroke.  A PEG tube was placed and he gets PEG feeds at home fortified with protein additives.  Evaluation in the ED revealed heart rate in the 120s, normal BP and urine analysis positive for nitrates and leukocyte esterase.  Will be admitted for IV antibiotics and IV fluids.       Review of Systems:  A comprehensive review of systems was negative except for that written in the History of Present Illness.     Past Medical History:   Diagnosis Date    CVA (cerebral vascular accident) (HCC)     Left-sided weakness     Subdural hematoma (HCC)         No past surgical history on file.    Prior to Admission medications    Medication Sig Start Date End Date Taking? Authorizing Provider   lisinopril (PRINIVIL;ZESTRIL) 10 MG tablet Take 1 tablet by mouth daily 4/14/24   Jan Velazquez PA-C   nystatin (MYCOSTATIN) 040456 UNIT/ML suspension Take 5 mLs by mouth 4 times daily 4/13/24   Jan Velazquez PA-C   aspirin 81 MG chewable tablet Take 1 tablet by mouth daily 4/26/24 5/26/24  Jan Velazquez PA-C   ibuprofen (ADVIL;MOTRIN) 100 MG/5ML suspension 10 mLs by Per NG tube route every 6 hours as needed for Fever or Pain 4/12/24   Jan Velazquez PA-C   acetaminophen (TYLENOL) 325 MG tablet Take 2 tablets by mouth

## 2024-05-24 NOTE — ED NOTES
ED TO INPATIENT SBAR HANDOFF    Patient Name: Florentino Dalal   Preferred Name: Florentino  : 1955  68 y.o.   Family/Caregiver Present: no   Code Status Order: DNR  PO Status: NPO:No  Telemetry Order:   C-SSRS: Risk of Suicide: No Risk  Sitter no  No  Restraints:     Sepsis Risk Score Sepsis Risk Score: 2.83    Situation  Chief Complaint   Patient presents with    Abdominal Pain    Back Pain     Brief Description of Patient's Condition: Patient presented for lower abdominal pain and painful urination. CT scan showed large ball of stool on lower colon. Patient was given soap suds enema in ED, but did not pass stool. Due to previously known dissecting carotid artery, MD discontinued further enemas and wished to continue with stool softeners only. The family says that his other doctors are aware of the dissecting carotid, so they aren't concerned about that, but they are concerned about the stool burden. The patient's family has been at bedside for 12 hours of stay in the ED.  Mental Status: oriented, alert, coherent, logical, thought processes intact, and able to concentrate and follow conversation  Arrived from:Home  Imaging:   CTA HEAD NECK W CONTRAST   Final Result   1. 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 No prior studies are   available to assess for evolution. There is no intraluminal thrombus.   2. Other than the right internal carotid artery, there is no acute intracranial   vascular abnormality.   3. Large right MCA territory late subacute to chronic infarct.         CT ABDOMEN PELVIS W IV CONTRAST Additional Contrast? None   Final Result   1. Bladder distention (~1.3 L). Mild secondary hydronephrosis and   ureterectasis.   2. Large stool ball in the rectum.   3. Cirrhosis and portal hypertension. No ascites.         XR ABDOMEN (KUB) (SINGLE AP VIEW)    (Results Pending)     Abnormal labs:   Abnormal Labs

## 2024-05-25 ENCOUNTER — APPOINTMENT (OUTPATIENT)
Facility: HOSPITAL | Age: 69
DRG: 690 | End: 2024-05-25
Payer: MEDICARE

## 2024-05-25 LAB
ANION GAP SERPL CALC-SCNC: 8 MMOL/L (ref 5–15)
BASOPHILS # BLD: 0.1 K/UL (ref 0–0.1)
BASOPHILS NFR BLD: 0 % (ref 0–1)
BUN SERPL-MCNC: 31 MG/DL (ref 6–20)
BUN/CREAT SERPL: 27 (ref 12–20)
CA-I BLD-MCNC: 9.1 MG/DL (ref 8.5–10.1)
CHLORIDE SERPL-SCNC: 111 MMOL/L (ref 97–108)
CO2 SERPL-SCNC: 18 MMOL/L (ref 21–32)
CREAT SERPL-MCNC: 1.16 MG/DL (ref 0.7–1.3)
DIFFERENTIAL METHOD BLD: ABNORMAL
EOSINOPHIL # BLD: 0 K/UL (ref 0–0.4)
EOSINOPHIL NFR BLD: 0 % (ref 0–7)
ERYTHROCYTE [DISTWIDTH] IN BLOOD BY AUTOMATED COUNT: 14.6 % (ref 11.5–14.5)
GLUCOSE SERPL-MCNC: 318 MG/DL (ref 65–100)
HCT VFR BLD AUTO: 36.2 % (ref 36.6–50.3)
HGB BLD-MCNC: 12.6 G/DL (ref 12.1–17)
IMM GRANULOCYTES # BLD AUTO: 0.1 K/UL (ref 0–0.04)
IMM GRANULOCYTES NFR BLD AUTO: 1 % (ref 0–0.5)
LYMPHOCYTES # BLD: 1.2 K/UL (ref 0.8–3.5)
LYMPHOCYTES NFR BLD: 9 % (ref 12–49)
MCH RBC QN AUTO: 32 PG (ref 26–34)
MCHC RBC AUTO-ENTMCNC: 34.8 G/DL (ref 30–36.5)
MCV RBC AUTO: 91.9 FL (ref 80–99)
MONOCYTES # BLD: 0.9 K/UL (ref 0–1)
MONOCYTES NFR BLD: 7 % (ref 5–13)
NEUTS SEG # BLD: 10.8 K/UL (ref 1.8–8)
NEUTS SEG NFR BLD: 83 % (ref 32–75)
NRBC # BLD: 0 K/UL (ref 0–0.01)
NRBC BLD-RTO: 0 PER 100 WBC
PLATELET # BLD AUTO: 110 K/UL (ref 150–400)
PMV BLD AUTO: 10.8 FL (ref 8.9–12.9)
POTASSIUM SERPL-SCNC: 3.8 MMOL/L (ref 3.5–5.1)
RBC # BLD AUTO: 3.94 M/UL (ref 4.1–5.7)
SODIUM SERPL-SCNC: 137 MMOL/L (ref 136–145)
WBC # BLD AUTO: 13 K/UL (ref 4.1–11.1)

## 2024-05-25 PROCEDURE — 6370000000 HC RX 637 (ALT 250 FOR IP): Performed by: STUDENT IN AN ORGANIZED HEALTH CARE EDUCATION/TRAINING PROGRAM

## 2024-05-25 PROCEDURE — 6370000000 HC RX 637 (ALT 250 FOR IP): Performed by: INTERNAL MEDICINE

## 2024-05-25 PROCEDURE — 36415 COLL VENOUS BLD VENIPUNCTURE: CPT

## 2024-05-25 PROCEDURE — 2580000003 HC RX 258: Performed by: INTERNAL MEDICINE

## 2024-05-25 PROCEDURE — 80048 BASIC METABOLIC PNL TOTAL CA: CPT

## 2024-05-25 PROCEDURE — 1100000000 HC RM PRIVATE

## 2024-05-25 PROCEDURE — 6360000002 HC RX W HCPCS: Performed by: INTERNAL MEDICINE

## 2024-05-25 PROCEDURE — 74018 RADEX ABDOMEN 1 VIEW: CPT

## 2024-05-25 PROCEDURE — 85025 COMPLETE CBC W/AUTO DIFF WBC: CPT

## 2024-05-25 RX ORDER — GLUCAGON 1 MG/ML
1 KIT INJECTION PRN
Status: DISCONTINUED | OUTPATIENT
Start: 2024-05-25 | End: 2024-06-06 | Stop reason: HOSPADM

## 2024-05-25 RX ORDER — INSULIN GLARGINE 100 [IU]/ML
20 INJECTION, SOLUTION SUBCUTANEOUS ONCE
Status: COMPLETED | OUTPATIENT
Start: 2024-05-25 | End: 2024-05-25

## 2024-05-25 RX ORDER — ENEMA 19; 7 G/133ML; G/133ML
1 ENEMA RECTAL
Status: COMPLETED | OUTPATIENT
Start: 2024-05-25 | End: 2024-05-25

## 2024-05-25 RX ORDER — INSULIN GLARGINE 100 [IU]/ML
34 INJECTION, SOLUTION SUBCUTANEOUS NIGHTLY
Status: DISCONTINUED | OUTPATIENT
Start: 2024-05-25 | End: 2024-06-06 | Stop reason: HOSPADM

## 2024-05-25 RX ORDER — DEXTROSE MONOHYDRATE 100 MG/ML
INJECTION, SOLUTION INTRAVENOUS CONTINUOUS PRN
Status: DISCONTINUED | OUTPATIENT
Start: 2024-05-25 | End: 2024-06-06 | Stop reason: HOSPADM

## 2024-05-25 RX ADMIN — DORZOLAMIDE HYDROCHLORIDE 1 DROP: 20 SOLUTION/ DROPS OPHTHALMIC at 08:41

## 2024-05-25 RX ADMIN — TIMOLOL MALEATE 1 DROP: 5 SOLUTION OPHTHALMIC at 21:18

## 2024-05-25 RX ADMIN — LATANOPROST 1 DROP: 50 SOLUTION OPHTHALMIC at 21:18

## 2024-05-25 RX ADMIN — INSULIN GLARGINE 34 UNITS: 100 INJECTION, SOLUTION SUBCUTANEOUS at 22:34

## 2024-05-25 RX ADMIN — METOPROLOL TARTRATE 25 MG: 25 TABLET, FILM COATED ORAL at 21:20

## 2024-05-25 RX ADMIN — INSULIN LISPRO 8 UNITS: 100 INJECTION, SOLUTION INTRAVENOUS; SUBCUTANEOUS at 11:50

## 2024-05-25 RX ADMIN — ASPIRIN 81 MG 81 MG: 81 TABLET ORAL at 08:42

## 2024-05-25 RX ADMIN — DORZOLAMIDE HYDROCHLORIDE 1 DROP: 20 SOLUTION/ DROPS OPHTHALMIC at 21:18

## 2024-05-25 RX ADMIN — SODIUM CHLORIDE, PRESERVATIVE FREE 10 ML: 5 INJECTION INTRAVENOUS at 08:31

## 2024-05-25 RX ADMIN — INSULIN LISPRO 2 UNITS: 100 INJECTION, SOLUTION INTRAVENOUS; SUBCUTANEOUS at 16:05

## 2024-05-25 RX ADMIN — SODIUM CHLORIDE: 9 INJECTION, SOLUTION INTRAVENOUS at 05:20

## 2024-05-25 RX ADMIN — INSULIN GLARGINE 20 UNITS: 100 INJECTION, SOLUTION SUBCUTANEOUS at 11:49

## 2024-05-25 RX ADMIN — LISINOPRIL 10 MG: 10 TABLET ORAL at 08:42

## 2024-05-25 RX ADMIN — SODIUM PHOSPHATE 1 ENEMA: 7; 19 ENEMA RECTAL at 14:37

## 2024-05-25 RX ADMIN — METOPROLOL TARTRATE 25 MG: 25 TABLET, FILM COATED ORAL at 08:42

## 2024-05-25 RX ADMIN — FINASTERIDE 5 MG: 5 TABLET, FILM COATED ORAL at 08:42

## 2024-05-25 RX ADMIN — DOCUSATE SODIUM 50MG AND SENNOSIDES 8.6MG 2 TABLET: 8.6; 5 TABLET, FILM COATED ORAL at 00:54

## 2024-05-25 RX ADMIN — CEFTRIAXONE SODIUM 1000 MG: 1 INJECTION, POWDER, FOR SOLUTION INTRAMUSCULAR; INTRAVENOUS at 14:36

## 2024-05-25 RX ADMIN — SODIUM CHLORIDE, PRESERVATIVE FREE 10 ML: 5 INJECTION INTRAVENOUS at 20:18

## 2024-05-25 RX ADMIN — INSULIN LISPRO 8 UNITS: 100 INJECTION, SOLUTION INTRAVENOUS; SUBCUTANEOUS at 08:39

## 2024-05-25 RX ADMIN — TIMOLOL MALEATE 1 DROP: 5 SOLUTION OPHTHALMIC at 08:41

## 2024-05-25 ASSESSMENT — PAIN SCALES - WONG BAKER: WONGBAKER_NUMERICALRESPONSE: NO HURT

## 2024-05-25 ASSESSMENT — PAIN SCALES - GENERAL: PAINLEVEL_OUTOF10: 0

## 2024-05-25 NOTE — CARE COORDINATION
05/25/24 1402   Service Assessment   Patient Orientation Alert and Oriented   Cognition Alert  (sleeping)   History Provided By Spouse   Primary Caregiver Spouse   Accompanied By/Relationship wife   Support Systems Family Members;Spouse/Significant Other   Patient's Healthcare Decision Maker is: Named in Scanned ACP Document   PCP Verified by CM Yes   Last Visit to PCP Within last 3 months   Prior Functional Level Assistance with the following:;Bathing;Dressing;Toileting;Cooking;Housework;Shopping;Mobility;Feeding   Current Functional Level Assistance with the following:;Dressing;Bathing;Toileting;Feeding;Cooking;Housework;Shopping;Mobility   Can patient return to prior living arrangement Unknown at present   Ability to make needs known: Good   Family able to assist with home care needs: Yes   Would you like for me to discuss the discharge plan with any other family members/significant others, and if so, who? Yes   Financial Resources Medicare   Community Resources None   Social/Functional History   Lives With Spouse   Home Equipment Wheelchair - Manual;Hospital bed  (ray lift bedsdie commode, shower chair)   Receives Help From Family     Lives with wife. Open with home health through [facility]. Recent discharge from [facility] rehab. Currently patient is using following DME; hospital bed, wheelchair, bedside commode, shower chair. Wife reports not really using shower chair or bedside commode currently. Transportation is issue. Wife was able to use a [employer] wheelchair van once.    Current getting home health PT/OT/ST/SN. Per wife Howard from OT recommending IRF at [facility]. Wife would like referral sent their. Explained we will need our therapy team to al. Wife understands. Orders placed for PT/OT.    Currently using [facility] pharmacy but wife may go back to using [facility] on [address].    Advance Care Planning   Healthcare Decision Maker:    Primary Decision Maker:

## 2024-05-26 LAB
ANION GAP SERPL CALC-SCNC: 6 MMOL/L (ref 5–15)
BACTERIA SPEC CULT: NORMAL
BASOPHILS # BLD: 0 K/UL (ref 0–0.1)
BASOPHILS NFR BLD: 1 % (ref 0–1)
BUN SERPL-MCNC: 31 MG/DL (ref 6–20)
BUN/CREAT SERPL: 31 (ref 12–20)
CA-I BLD-MCNC: 9 MG/DL (ref 8.5–10.1)
CHLORIDE SERPL-SCNC: 116 MMOL/L (ref 97–108)
CO2 SERPL-SCNC: 24 MMOL/L (ref 21–32)
COLONY COUNT, CNT: NORMAL
CREAT SERPL-MCNC: 1.01 MG/DL (ref 0.7–1.3)
DIFFERENTIAL METHOD BLD: ABNORMAL
EOSINOPHIL # BLD: 0.2 K/UL (ref 0–0.4)
EOSINOPHIL NFR BLD: 3 % (ref 0–7)
ERYTHROCYTE [DISTWIDTH] IN BLOOD BY AUTOMATED COUNT: 15 % (ref 11.5–14.5)
GLUCOSE SERPL-MCNC: 173 MG/DL (ref 65–100)
HCT VFR BLD AUTO: 37.2 % (ref 36.6–50.3)
HGB BLD-MCNC: 12.1 G/DL (ref 12.1–17)
IMM GRANULOCYTES # BLD AUTO: 0 K/UL (ref 0–0.04)
IMM GRANULOCYTES NFR BLD AUTO: 1 % (ref 0–0.5)
LYMPHOCYTES # BLD: 0.9 K/UL (ref 0.8–3.5)
LYMPHOCYTES NFR BLD: 18 % (ref 12–49)
Lab: NORMAL
MCH RBC QN AUTO: 31.7 PG (ref 26–34)
MCHC RBC AUTO-ENTMCNC: 32.5 G/DL (ref 30–36.5)
MCV RBC AUTO: 97.4 FL (ref 80–99)
MONOCYTES # BLD: 0.4 K/UL (ref 0–1)
MONOCYTES NFR BLD: 7 % (ref 5–13)
NEUTS SEG # BLD: 3.7 K/UL (ref 1.8–8)
NEUTS SEG NFR BLD: 70 % (ref 32–75)
NRBC # BLD: 0 K/UL (ref 0–0.01)
NRBC BLD-RTO: 0 PER 100 WBC
PLATELET # BLD AUTO: 73 K/UL (ref 150–400)
PMV BLD AUTO: 11.2 FL (ref 8.9–12.9)
POTASSIUM SERPL-SCNC: 3.4 MMOL/L (ref 3.5–5.1)
RBC # BLD AUTO: 3.82 M/UL (ref 4.1–5.7)
SODIUM SERPL-SCNC: 146 MMOL/L (ref 136–145)
WBC # BLD AUTO: 5.2 K/UL (ref 4.1–11.1)

## 2024-05-26 PROCEDURE — 6370000000 HC RX 637 (ALT 250 FOR IP): Performed by: INTERNAL MEDICINE

## 2024-05-26 PROCEDURE — 85025 COMPLETE CBC W/AUTO DIFF WBC: CPT

## 2024-05-26 PROCEDURE — 6360000002 HC RX W HCPCS: Performed by: INTERNAL MEDICINE

## 2024-05-26 PROCEDURE — 97161 PT EVAL LOW COMPLEX 20 MIN: CPT

## 2024-05-26 PROCEDURE — 2580000003 HC RX 258: Performed by: INTERNAL MEDICINE

## 2024-05-26 PROCEDURE — 1100000000 HC RM PRIVATE

## 2024-05-26 PROCEDURE — 6370000000 HC RX 637 (ALT 250 FOR IP): Performed by: HOSPITALIST

## 2024-05-26 PROCEDURE — 36415 COLL VENOUS BLD VENIPUNCTURE: CPT

## 2024-05-26 PROCEDURE — 80048 BASIC METABOLIC PNL TOTAL CA: CPT

## 2024-05-26 PROCEDURE — 6370000000 HC RX 637 (ALT 250 FOR IP): Performed by: STUDENT IN AN ORGANIZED HEALTH CARE EDUCATION/TRAINING PROGRAM

## 2024-05-26 PROCEDURE — 97530 THERAPEUTIC ACTIVITIES: CPT

## 2024-05-26 PROCEDURE — 99222 1ST HOSP IP/OBS MODERATE 55: CPT | Performed by: STUDENT IN AN ORGANIZED HEALTH CARE EDUCATION/TRAINING PROGRAM

## 2024-05-26 RX ORDER — ENEMA 19; 7 G/133ML; G/133ML
1 ENEMA RECTAL
Status: COMPLETED | OUTPATIENT
Start: 2024-05-26 | End: 2024-05-26

## 2024-05-26 RX ADMIN — INSULIN GLARGINE 34 UNITS: 100 INJECTION, SOLUTION SUBCUTANEOUS at 21:19

## 2024-05-26 RX ADMIN — HYDRALAZINE HYDROCHLORIDE 10 MG: 20 INJECTION, SOLUTION INTRAMUSCULAR; INTRAVENOUS at 21:30

## 2024-05-26 RX ADMIN — SODIUM PHOSPHATE 1 ENEMA: 7; 19 ENEMA RECTAL at 22:37

## 2024-05-26 RX ADMIN — METOPROLOL TARTRATE 25 MG: 25 TABLET, FILM COATED ORAL at 21:19

## 2024-05-26 RX ADMIN — CEFTRIAXONE SODIUM 1000 MG: 1 INJECTION, POWDER, FOR SOLUTION INTRAMUSCULAR; INTRAVENOUS at 14:23

## 2024-05-26 RX ADMIN — METOPROLOL TARTRATE 25 MG: 25 TABLET, FILM COATED ORAL at 09:35

## 2024-05-26 RX ADMIN — DORZOLAMIDE HYDROCHLORIDE 1 DROP: 20 SOLUTION/ DROPS OPHTHALMIC at 09:39

## 2024-05-26 RX ADMIN — DORZOLAMIDE HYDROCHLORIDE 1 DROP: 20 SOLUTION/ DROPS OPHTHALMIC at 21:21

## 2024-05-26 RX ADMIN — SODIUM CHLORIDE, PRESERVATIVE FREE 10 ML: 5 INJECTION INTRAVENOUS at 21:21

## 2024-05-26 RX ADMIN — IBUPROFEN 200 MG: 100 SUSPENSION ORAL at 12:11

## 2024-05-26 RX ADMIN — FINASTERIDE 5 MG: 5 TABLET, FILM COATED ORAL at 09:36

## 2024-05-26 RX ADMIN — INSULIN LISPRO 2 UNITS: 100 INJECTION, SOLUTION INTRAVENOUS; SUBCUTANEOUS at 17:25

## 2024-05-26 RX ADMIN — ASPIRIN 81 MG 81 MG: 81 TABLET ORAL at 09:36

## 2024-05-26 RX ADMIN — TIMOLOL MALEATE 1 DROP: 5 SOLUTION OPHTHALMIC at 21:20

## 2024-05-26 RX ADMIN — LISINOPRIL 10 MG: 10 TABLET ORAL at 09:36

## 2024-05-26 RX ADMIN — DOCUSATE SODIUM 50MG AND SENNOSIDES 8.6MG 2 TABLET: 8.6; 5 TABLET, FILM COATED ORAL at 17:27

## 2024-05-26 RX ADMIN — SODIUM CHLORIDE: 9 INJECTION, SOLUTION INTRAVENOUS at 14:26

## 2024-05-26 RX ADMIN — TIMOLOL MALEATE 1 DROP: 5 SOLUTION OPHTHALMIC at 09:38

## 2024-05-26 RX ADMIN — BISACODYL 10 MG: 10 SUPPOSITORY RECTAL at 04:35

## 2024-05-26 RX ADMIN — LATANOPROST 1 DROP: 50 SOLUTION OPHTHALMIC at 21:20

## 2024-05-26 ASSESSMENT — PAIN DESCRIPTION - LOCATION: LOCATION: ABDOMEN

## 2024-05-26 ASSESSMENT — PAIN - FUNCTIONAL ASSESSMENT: PAIN_FUNCTIONAL_ASSESSMENT: PREVENTS OR INTERFERES WITH MANY ACTIVE NOT PASSIVE ACTIVITIES

## 2024-05-26 ASSESSMENT — PAIN DESCRIPTION - DESCRIPTORS: DESCRIPTORS: ACHING

## 2024-05-26 ASSESSMENT — PAIN SCALES - GENERAL
PAINLEVEL_OUTOF10: 0
PAINLEVEL_OUTOF10: 2
PAINLEVEL_OUTOF10: 6

## 2024-05-26 ASSESSMENT — PAIN DESCRIPTION - ORIENTATION: ORIENTATION: LOWER

## 2024-05-26 ASSESSMENT — PAIN SCALES - WONG BAKER
WONGBAKER_NUMERICALRESPONSE: NO HURT
WONGBAKER_NUMERICALRESPONSE: NO HURT

## 2024-05-27 LAB
ANION GAP SERPL CALC-SCNC: 5 MMOL/L (ref 5–15)
BASOPHILS # BLD: 0 K/UL (ref 0–0.1)
BASOPHILS NFR BLD: 1 % (ref 0–1)
BUN SERPL-MCNC: 27 MG/DL (ref 6–20)
BUN/CREAT SERPL: 36 (ref 12–20)
CA-I BLD-MCNC: 8.8 MG/DL (ref 8.5–10.1)
CHLORIDE SERPL-SCNC: 116 MMOL/L (ref 97–108)
CO2 SERPL-SCNC: 24 MMOL/L (ref 21–32)
CREAT SERPL-MCNC: 0.74 MG/DL (ref 0.7–1.3)
DIFFERENTIAL METHOD BLD: ABNORMAL
EOSINOPHIL # BLD: 0.2 K/UL (ref 0–0.4)
EOSINOPHIL NFR BLD: 5 % (ref 0–7)
ERYTHROCYTE [DISTWIDTH] IN BLOOD BY AUTOMATED COUNT: 14.5 % (ref 11.5–14.5)
GLUCOSE SERPL-MCNC: 135 MG/DL (ref 65–100)
HCT VFR BLD AUTO: 33.6 % (ref 36.6–50.3)
HGB BLD-MCNC: 11.3 G/DL (ref 12.1–17)
IMM GRANULOCYTES # BLD AUTO: 0 K/UL (ref 0–0.04)
IMM GRANULOCYTES NFR BLD AUTO: 0 % (ref 0–0.5)
LYMPHOCYTES # BLD: 1.2 K/UL (ref 0.8–3.5)
LYMPHOCYTES NFR BLD: 25 % (ref 12–49)
MCH RBC QN AUTO: 32.4 PG (ref 26–34)
MCHC RBC AUTO-ENTMCNC: 33.6 G/DL (ref 30–36.5)
MCV RBC AUTO: 96.3 FL (ref 80–99)
MONOCYTES # BLD: 0.4 K/UL (ref 0–1)
MONOCYTES NFR BLD: 8 % (ref 5–13)
NEUTS SEG # BLD: 2.9 K/UL (ref 1.8–8)
NEUTS SEG NFR BLD: 61 % (ref 32–75)
NRBC # BLD: 0 K/UL (ref 0–0.01)
NRBC BLD-RTO: 0 PER 100 WBC
PLATELET # BLD AUTO: 63 K/UL (ref 150–400)
PMV BLD AUTO: 10.4 FL (ref 8.9–12.9)
POTASSIUM SERPL-SCNC: 3.4 MMOL/L (ref 3.5–5.1)
RBC # BLD AUTO: 3.49 M/UL (ref 4.1–5.7)
SODIUM SERPL-SCNC: 145 MMOL/L (ref 136–145)
WBC # BLD AUTO: 4.8 K/UL (ref 4.1–11.1)

## 2024-05-27 PROCEDURE — 1100000000 HC RM PRIVATE

## 2024-05-27 PROCEDURE — 6370000000 HC RX 637 (ALT 250 FOR IP): Performed by: STUDENT IN AN ORGANIZED HEALTH CARE EDUCATION/TRAINING PROGRAM

## 2024-05-27 PROCEDURE — 6360000002 HC RX W HCPCS: Performed by: INTERNAL MEDICINE

## 2024-05-27 PROCEDURE — 6370000000 HC RX 637 (ALT 250 FOR IP): Performed by: INTERNAL MEDICINE

## 2024-05-27 PROCEDURE — 2580000003 HC RX 258: Performed by: INTERNAL MEDICINE

## 2024-05-27 PROCEDURE — 85025 COMPLETE CBC W/AUTO DIFF WBC: CPT

## 2024-05-27 PROCEDURE — 80048 BASIC METABOLIC PNL TOTAL CA: CPT

## 2024-05-27 PROCEDURE — 97530 THERAPEUTIC ACTIVITIES: CPT

## 2024-05-27 PROCEDURE — 36415 COLL VENOUS BLD VENIPUNCTURE: CPT

## 2024-05-27 RX ADMIN — FINASTERIDE 5 MG: 5 TABLET, FILM COATED ORAL at 09:14

## 2024-05-27 RX ADMIN — TIMOLOL MALEATE 1 DROP: 5 SOLUTION OPHTHALMIC at 09:15

## 2024-05-27 RX ADMIN — LATANOPROST 1 DROP: 50 SOLUTION OPHTHALMIC at 21:19

## 2024-05-27 RX ADMIN — TIMOLOL MALEATE 1 DROP: 5 SOLUTION OPHTHALMIC at 21:19

## 2024-05-27 RX ADMIN — DORZOLAMIDE HYDROCHLORIDE 1 DROP: 20 SOLUTION/ DROPS OPHTHALMIC at 09:15

## 2024-05-27 RX ADMIN — SODIUM CHLORIDE, PRESERVATIVE FREE 10 ML: 5 INJECTION INTRAVENOUS at 21:18

## 2024-05-27 RX ADMIN — SODIUM CHLORIDE, PRESERVATIVE FREE 10 ML: 5 INJECTION INTRAVENOUS at 09:05

## 2024-05-27 RX ADMIN — ASPIRIN 81 MG 81 MG: 81 TABLET ORAL at 09:15

## 2024-05-27 RX ADMIN — METOPROLOL TARTRATE 25 MG: 25 TABLET, FILM COATED ORAL at 09:14

## 2024-05-27 RX ADMIN — METOPROLOL TARTRATE 25 MG: 25 TABLET, FILM COATED ORAL at 21:18

## 2024-05-27 RX ADMIN — CEFTRIAXONE SODIUM 1000 MG: 1 INJECTION, POWDER, FOR SOLUTION INTRAMUSCULAR; INTRAVENOUS at 14:20

## 2024-05-27 RX ADMIN — LISINOPRIL 10 MG: 10 TABLET ORAL at 09:14

## 2024-05-27 RX ADMIN — DORZOLAMIDE HYDROCHLORIDE 1 DROP: 20 SOLUTION/ DROPS OPHTHALMIC at 21:19

## 2024-05-28 LAB
ALBUMIN SERPL-MCNC: 2.4 G/DL (ref 3.5–5)
ALBUMIN/GLOB SERPL: 0.6 (ref 1.1–2.2)
ALP SERPL-CCNC: 151 U/L (ref 45–117)
ALT SERPL-CCNC: 60 U/L (ref 12–78)
ANION GAP BLD CALC-SCNC: 15
ANION GAP SERPL CALC-SCNC: 6 MMOL/L (ref 5–15)
AST SERPL W P-5'-P-CCNC: 46 U/L (ref 15–37)
BASOPHILS # BLD: 0.1 K/UL (ref 0–0.1)
BASOPHILS NFR BLD: 1 % (ref 0–1)
BILIRUB SERPL-MCNC: 0.7 MG/DL (ref 0.2–1)
BUN SERPL-MCNC: 23 MG/DL (ref 6–20)
BUN/CREAT SERPL: 28 (ref 12–20)
CA-I BLD-MCNC: 1.13 MMOL/L (ref 1.12–1.32)
CA-I BLD-MCNC: 8.9 MG/DL (ref 8.5–10.1)
CHLORIDE BLD-SCNC: 108 MMOL/L (ref 98–107)
CHLORIDE SERPL-SCNC: 112 MMOL/L (ref 97–108)
CO2 BLD-SCNC: 19 MMOL/L
CO2 SERPL-SCNC: 26 MMOL/L (ref 21–32)
CREAT SERPL-MCNC: 0.83 MG/DL (ref 0.7–1.3)
CREAT UR-MCNC: 0.67 MG/DL (ref 0.6–1.3)
DIFFERENTIAL METHOD BLD: ABNORMAL
EOSINOPHIL # BLD: 0.4 K/UL (ref 0–0.4)
EOSINOPHIL NFR BLD: 7 % (ref 0–7)
ERYTHROCYTE [DISTWIDTH] IN BLOOD BY AUTOMATED COUNT: 14.3 % (ref 11.5–14.5)
GLOBULIN SER CALC-MCNC: 3.8 G/DL (ref 2–4)
GLUCOSE BLD STRIP.AUTO-MCNC: 345 MG/DL (ref 65–100)
GLUCOSE SERPL-MCNC: 115 MG/DL (ref 65–100)
HCT VFR BLD AUTO: 34 % (ref 36.6–50.3)
HGB BLD-MCNC: 11.2 G/DL (ref 12.1–17)
IMM GRANULOCYTES # BLD AUTO: 0 K/UL (ref 0–0.04)
IMM GRANULOCYTES NFR BLD AUTO: 1 % (ref 0–0.5)
LACTATE BLD-SCNC: 2.34 MMOL/L (ref 0.4–2)
LACTATE BLD-SCNC: 2.39 MMOL/L (ref 0.4–2)
LACTATE BLD-SCNC: 2.52 MMOL/L (ref 0.4–2)
LYMPHOCYTES # BLD: 1.1 K/UL (ref 0.8–3.5)
LYMPHOCYTES NFR BLD: 21 % (ref 12–49)
MAGNESIUM SERPL-MCNC: 2 MG/DL (ref 1.6–2.4)
MCH RBC QN AUTO: 31.8 PG (ref 26–34)
MCHC RBC AUTO-ENTMCNC: 32.9 G/DL (ref 30–36.5)
MCV RBC AUTO: 96.6 FL (ref 80–99)
MONOCYTES # BLD: 0.4 K/UL (ref 0–1)
MONOCYTES NFR BLD: 8 % (ref 5–13)
NEUTS SEG # BLD: 3.3 K/UL (ref 1.8–8)
NEUTS SEG NFR BLD: 62 % (ref 32–75)
NRBC # BLD: 0 K/UL (ref 0–0.01)
NRBC BLD-RTO: 0 PER 100 WBC
PERFORMED BY:: ABNORMAL
PLATELET # BLD AUTO: 65 K/UL (ref 150–400)
PMV BLD AUTO: 11.2 FL (ref 8.9–12.9)
POTASSIUM BLD-SCNC: 4.3 MMOL/L (ref 3.5–5.5)
POTASSIUM SERPL-SCNC: 3.2 MMOL/L (ref 3.5–5.1)
PROT SERPL-MCNC: 6.2 G/DL (ref 6.4–8.2)
RBC # BLD AUTO: 3.52 M/UL (ref 4.1–5.7)
SODIUM BLD-SCNC: 141 MMOL/L (ref 136–145)
SODIUM SERPL-SCNC: 144 MMOL/L (ref 136–145)
SPECIMEN SITE: ABNORMAL
WBC # BLD AUTO: 5.2 K/UL (ref 4.1–11.1)

## 2024-05-28 PROCEDURE — 6360000002 HC RX W HCPCS: Performed by: INTERNAL MEDICINE

## 2024-05-28 PROCEDURE — 36415 COLL VENOUS BLD VENIPUNCTURE: CPT

## 2024-05-28 PROCEDURE — 1100000000 HC RM PRIVATE

## 2024-05-28 PROCEDURE — 2580000003 HC RX 258: Performed by: INTERNAL MEDICINE

## 2024-05-28 PROCEDURE — 85025 COMPLETE CBC W/AUTO DIFF WBC: CPT

## 2024-05-28 PROCEDURE — 6370000000 HC RX 637 (ALT 250 FOR IP): Performed by: STUDENT IN AN ORGANIZED HEALTH CARE EDUCATION/TRAINING PROGRAM

## 2024-05-28 PROCEDURE — 97165 OT EVAL LOW COMPLEX 30 MIN: CPT

## 2024-05-28 PROCEDURE — 80053 COMPREHEN METABOLIC PANEL: CPT

## 2024-05-28 PROCEDURE — 83735 ASSAY OF MAGNESIUM: CPT

## 2024-05-28 PROCEDURE — 6370000000 HC RX 637 (ALT 250 FOR IP): Performed by: INTERNAL MEDICINE

## 2024-05-28 PROCEDURE — 97530 THERAPEUTIC ACTIVITIES: CPT

## 2024-05-28 PROCEDURE — 97110 THERAPEUTIC EXERCISES: CPT

## 2024-05-28 RX ADMIN — FINASTERIDE 5 MG: 5 TABLET, FILM COATED ORAL at 10:23

## 2024-05-28 RX ADMIN — LATANOPROST 1 DROP: 50 SOLUTION OPHTHALMIC at 21:47

## 2024-05-28 RX ADMIN — LISINOPRIL 10 MG: 10 TABLET ORAL at 10:22

## 2024-05-28 RX ADMIN — METOPROLOL TARTRATE 25 MG: 25 TABLET, FILM COATED ORAL at 10:23

## 2024-05-28 RX ADMIN — DORZOLAMIDE HYDROCHLORIDE 1 DROP: 20 SOLUTION/ DROPS OPHTHALMIC at 21:47

## 2024-05-28 RX ADMIN — INSULIN GLARGINE 34 UNITS: 100 INJECTION, SOLUTION SUBCUTANEOUS at 00:10

## 2024-05-28 RX ADMIN — DORZOLAMIDE HYDROCHLORIDE 1 DROP: 20 SOLUTION/ DROPS OPHTHALMIC at 10:22

## 2024-05-28 RX ADMIN — CEFTRIAXONE SODIUM 1000 MG: 1 INJECTION, POWDER, FOR SOLUTION INTRAMUSCULAR; INTRAVENOUS at 18:00

## 2024-05-28 RX ADMIN — ASPIRIN 81 MG 81 MG: 81 TABLET ORAL at 10:49

## 2024-05-28 RX ADMIN — SODIUM CHLORIDE, PRESERVATIVE FREE 10 ML: 5 INJECTION INTRAVENOUS at 10:49

## 2024-05-28 RX ADMIN — SODIUM CHLORIDE, PRESERVATIVE FREE 10 ML: 5 INJECTION INTRAVENOUS at 21:46

## 2024-05-28 RX ADMIN — TIMOLOL MALEATE 1 DROP: 5 SOLUTION OPHTHALMIC at 10:22

## 2024-05-28 RX ADMIN — INSULIN GLARGINE 34 UNITS: 100 INJECTION, SOLUTION SUBCUTANEOUS at 21:46

## 2024-05-28 RX ADMIN — TIMOLOL MALEATE 1 DROP: 5 SOLUTION OPHTHALMIC at 21:47

## 2024-05-28 RX ADMIN — METOPROLOL TARTRATE 25 MG: 25 TABLET, FILM COATED ORAL at 21:46

## 2024-05-28 ASSESSMENT — PAIN SCALES - GENERAL: PAINLEVEL_OUTOF10: 0

## 2024-05-28 ASSESSMENT — PAIN SCALES - WONG BAKER: WONGBAKER_NUMERICALRESPONSE: NO HURT

## 2024-05-28 NOTE — FLOWSHEET NOTE
Blood sugar 199 using the patients own glucometer,No insulin adm now as feeds are due at midnight,Will administer Luntus with the Mid night feeds.

## 2024-05-28 NOTE — CARE COORDINATION
CM reviewed chart.    [facility] is following patient. Will need insurance authorization.    CM will continue to follow

## 2024-05-29 LAB
ALBUMIN SERPL-MCNC: 2.4 G/DL (ref 3.5–5)
ALBUMIN/GLOB SERPL: 0.6 (ref 1.1–2.2)
ALP SERPL-CCNC: 183 U/L (ref 45–117)
ALT SERPL-CCNC: 74 U/L (ref 12–78)
ANION GAP SERPL CALC-SCNC: 7 MMOL/L (ref 5–15)
AST SERPL W P-5'-P-CCNC: 53 U/L (ref 15–37)
BASOPHILS # BLD: 0.1 K/UL (ref 0–0.1)
BASOPHILS NFR BLD: 1 % (ref 0–1)
BILIRUB SERPL-MCNC: 0.8 MG/DL (ref 0.2–1)
BUN SERPL-MCNC: 19 MG/DL (ref 6–20)
BUN/CREAT SERPL: 25 (ref 12–20)
CA-I BLD-MCNC: 8.7 MG/DL (ref 8.5–10.1)
CHLORIDE SERPL-SCNC: 113 MMOL/L (ref 97–108)
CO2 SERPL-SCNC: 23 MMOL/L (ref 21–32)
CREAT SERPL-MCNC: 0.75 MG/DL (ref 0.7–1.3)
DIFFERENTIAL METHOD BLD: ABNORMAL
EOSINOPHIL # BLD: 0.4 K/UL (ref 0–0.4)
EOSINOPHIL NFR BLD: 7 % (ref 0–7)
ERYTHROCYTE [DISTWIDTH] IN BLOOD BY AUTOMATED COUNT: 13.9 % (ref 11.5–14.5)
GLOBULIN SER CALC-MCNC: 4.1 G/DL (ref 2–4)
GLUCOSE BLD STRIP.AUTO-MCNC: 125 MG/DL (ref 65–100)
GLUCOSE SERPL-MCNC: 135 MG/DL (ref 65–100)
HCT VFR BLD AUTO: 35.6 % (ref 36.6–50.3)
HGB BLD-MCNC: 11.7 G/DL (ref 12.1–17)
IMM GRANULOCYTES # BLD AUTO: 0 K/UL (ref 0–0.04)
IMM GRANULOCYTES NFR BLD AUTO: 1 % (ref 0–0.5)
LYMPHOCYTES # BLD: 1.1 K/UL (ref 0.8–3.5)
LYMPHOCYTES NFR BLD: 22 % (ref 12–49)
MAGNESIUM SERPL-MCNC: 2.2 MG/DL (ref 1.6–2.4)
MCH RBC QN AUTO: 31.5 PG (ref 26–34)
MCHC RBC AUTO-ENTMCNC: 32.9 G/DL (ref 30–36.5)
MCV RBC AUTO: 96 FL (ref 80–99)
MONOCYTES # BLD: 0.4 K/UL (ref 0–1)
MONOCYTES NFR BLD: 7 % (ref 5–13)
NEUTS SEG # BLD: 3.1 K/UL (ref 1.8–8)
NEUTS SEG NFR BLD: 62 % (ref 32–75)
NRBC # BLD: 0 K/UL (ref 0–0.01)
NRBC BLD-RTO: 0 PER 100 WBC
PERFORMED BY:: ABNORMAL
PLATELET # BLD AUTO: 66 K/UL (ref 150–400)
PMV BLD AUTO: 11.2 FL (ref 8.9–12.9)
POTASSIUM SERPL-SCNC: 3.1 MMOL/L (ref 3.5–5.1)
PROT SERPL-MCNC: 6.5 G/DL (ref 6.4–8.2)
RBC # BLD AUTO: 3.71 M/UL (ref 4.1–5.7)
SODIUM SERPL-SCNC: 143 MMOL/L (ref 136–145)
WBC # BLD AUTO: 5 K/UL (ref 4.1–11.1)

## 2024-05-29 PROCEDURE — 36415 COLL VENOUS BLD VENIPUNCTURE: CPT

## 2024-05-29 PROCEDURE — 6370000000 HC RX 637 (ALT 250 FOR IP): Performed by: INTERNAL MEDICINE

## 2024-05-29 PROCEDURE — 80053 COMPREHEN METABOLIC PANEL: CPT

## 2024-05-29 PROCEDURE — 6370000000 HC RX 637 (ALT 250 FOR IP): Performed by: STUDENT IN AN ORGANIZED HEALTH CARE EDUCATION/TRAINING PROGRAM

## 2024-05-29 PROCEDURE — 1100000000 HC RM PRIVATE

## 2024-05-29 PROCEDURE — 2580000003 HC RX 258: Performed by: INTERNAL MEDICINE

## 2024-05-29 PROCEDURE — 83735 ASSAY OF MAGNESIUM: CPT

## 2024-05-29 PROCEDURE — 82962 GLUCOSE BLOOD TEST: CPT

## 2024-05-29 PROCEDURE — 85025 COMPLETE CBC W/AUTO DIFF WBC: CPT

## 2024-05-29 RX ORDER — TIMOLOL MALEATE 5 MG/ML
1 SOLUTION/ DROPS OPHTHALMIC DAILY
Status: DISCONTINUED | OUTPATIENT
Start: 2024-05-30 | End: 2024-06-06 | Stop reason: HOSPADM

## 2024-05-29 RX ORDER — DORZOLAMIDE HCL 20 MG/ML
1 SOLUTION/ DROPS OPHTHALMIC DAILY
Status: DISCONTINUED | OUTPATIENT
Start: 2024-05-30 | End: 2024-06-06 | Stop reason: HOSPADM

## 2024-05-29 RX ADMIN — METOPROLOL TARTRATE 25 MG: 25 TABLET, FILM COATED ORAL at 10:17

## 2024-05-29 RX ADMIN — DORZOLAMIDE HYDROCHLORIDE 1 DROP: 20 SOLUTION/ DROPS OPHTHALMIC at 10:19

## 2024-05-29 RX ADMIN — METOPROLOL TARTRATE 25 MG: 25 TABLET, FILM COATED ORAL at 21:10

## 2024-05-29 RX ADMIN — LATANOPROST 1 DROP: 50 SOLUTION OPHTHALMIC at 21:10

## 2024-05-29 RX ADMIN — FINASTERIDE 5 MG: 5 TABLET, FILM COATED ORAL at 10:17

## 2024-05-29 RX ADMIN — TIMOLOL MALEATE 1 DROP: 5 SOLUTION OPHTHALMIC at 10:20

## 2024-05-29 RX ADMIN — LISINOPRIL 10 MG: 10 TABLET ORAL at 10:17

## 2024-05-29 RX ADMIN — DORZOLAMIDE HYDROCHLORIDE 1 DROP: 20 SOLUTION/ DROPS OPHTHALMIC at 21:10

## 2024-05-29 RX ADMIN — INSULIN GLARGINE 34 UNITS: 100 INJECTION, SOLUTION SUBCUTANEOUS at 21:10

## 2024-05-29 RX ADMIN — TIMOLOL MALEATE 1 DROP: 5 SOLUTION OPHTHALMIC at 21:10

## 2024-05-29 RX ADMIN — ASPIRIN 81 MG 81 MG: 81 TABLET ORAL at 10:17

## 2024-05-29 RX ADMIN — INSULIN LISPRO 2 UNITS: 100 INJECTION, SOLUTION INTRAVENOUS; SUBCUTANEOUS at 13:29

## 2024-05-29 RX ADMIN — SODIUM CHLORIDE, PRESERVATIVE FREE 10 ML: 5 INJECTION INTRAVENOUS at 10:21

## 2024-05-29 RX ADMIN — SODIUM CHLORIDE, PRESERVATIVE FREE 10 ML: 5 INJECTION INTRAVENOUS at 21:11

## 2024-05-29 ASSESSMENT — PAIN SCALES - GENERAL: PAINLEVEL_OUTOF10: 0

## 2024-05-29 ASSESSMENT — PAIN SCALES - WONG BAKER: WONGBAKER_NUMERICALRESPONSE: NO HURT

## 2024-05-29 NOTE — CARE COORDINATION
CM reviewed chart.    DCP: [facility] ([address])    1346: CM spoke with CHRISTINE in Admissions to follow up on referral. Per CHRISTINE, he has all the information needed and will review further this afternoon. Patient will require insurance auth. CM advised that patient is cleared for discharge at this time.     CM will continue to follow

## 2024-05-29 NOTE — WOUND CARE
Wound Care Note:      Wound Care into see patient because of redness to perineum     Patient resting in bed with family at bedside, patient has been having frequent loose stools as well as some possible leaking from [place].     IAD with excoriation to perineum and scrotum, skin is intact at this time with blanchable erythema but is very red, tender and denuded. Area cleansed with barrier wipe and foaming cleanser due to small soft BM and zinc paste applied to perineum and stoma powder applied to scrotum and then zinc paste applied over that.     Triad paste delivered to patient room for incontinence care, when cleansing only remove top soiled layer and then reapply new layer of triad. Do not try to remove all paste as this will irritate skin more.     Wound Care Documentation:  Wound 05/24/24 Perineum Posterior excoriation (Active)   Wound Image    05/29/24 1107   Wound Etiology Other 05/29/24 1107   Dressing Status New dressing applied 05/29/24 1107   Wound Cleansed Other (Comment) 05/29/24 1107   Dressing/Treatment Zinc paste;Other (comment) 05/29/24 1107   Wound Assessment Pink/red;Denuded 05/29/24 1107   Drainage Amount None (dry) 05/29/24 1107   Odor None 05/29/24 1107   Meg-wound Assessment Excoriated;Fragile;Blanchable erythema 05/29/24 1107   Number of days: 4     Use foam body alignment wedge to turn patient q2h at approximately 30 degree angle to offload sacrum to prevent pressure related skin injury.  Do not position directly on greater trochanter.       If patient needs a break from boots or can not tolerate them; Float heels on pillow at all times while in bed, place pillow long ways under patient leg so knee is supported and heel is hanging off edge of pillow.    Boot are to be applied daily and kept on at all times while in bed for offloading and to prevent pressure injuries.        Please re-consult for any changes in skin condition.

## 2024-05-30 LAB
ALBUMIN SERPL-MCNC: 2.4 G/DL (ref 3.5–5)
ALBUMIN/GLOB SERPL: 0.6 (ref 1.1–2.2)
ALP SERPL-CCNC: 183 U/L (ref 45–117)
ALT SERPL-CCNC: 68 U/L (ref 12–78)
ANION GAP SERPL CALC-SCNC: 6 MMOL/L (ref 5–15)
ANION GAP SERPL CALC-SCNC: 7 MMOL/L (ref 5–15)
AST SERPL W P-5'-P-CCNC: 44 U/L (ref 15–37)
BASOPHILS # BLD: 0 K/UL (ref 0–0.1)
BASOPHILS NFR BLD: 1 % (ref 0–1)
BILIRUB SERPL-MCNC: 1.1 MG/DL (ref 0.2–1)
BUN SERPL-MCNC: 21 MG/DL (ref 6–20)
BUN SERPL-MCNC: 22 MG/DL (ref 6–20)
BUN/CREAT SERPL: 28 (ref 12–20)
BUN/CREAT SERPL: 29 (ref 12–20)
CA-I BLD-MCNC: 8.2 MG/DL (ref 8.5–10.1)
CA-I BLD-MCNC: 8.7 MG/DL (ref 8.5–10.1)
CHLORIDE SERPL-SCNC: 112 MMOL/L (ref 97–108)
CHLORIDE SERPL-SCNC: 112 MMOL/L (ref 97–108)
CO2 SERPL-SCNC: 23 MMOL/L (ref 21–32)
CO2 SERPL-SCNC: 23 MMOL/L (ref 21–32)
CREAT SERPL-MCNC: 0.73 MG/DL (ref 0.7–1.3)
CREAT SERPL-MCNC: 0.78 MG/DL (ref 0.7–1.3)
DIFFERENTIAL METHOD BLD: ABNORMAL
EOSINOPHIL # BLD: 0.3 K/UL (ref 0–0.4)
EOSINOPHIL NFR BLD: 6 % (ref 0–7)
ERYTHROCYTE [DISTWIDTH] IN BLOOD BY AUTOMATED COUNT: 13.9 % (ref 11.5–14.5)
GLOBULIN SER CALC-MCNC: 4 G/DL (ref 2–4)
GLUCOSE SERPL-MCNC: 128 MG/DL (ref 65–100)
GLUCOSE SERPL-MCNC: 193 MG/DL (ref 65–100)
HCT VFR BLD AUTO: 34.3 % (ref 36.6–50.3)
HGB BLD-MCNC: 11.6 G/DL (ref 12.1–17)
IMM GRANULOCYTES # BLD AUTO: 0 K/UL (ref 0–0.04)
IMM GRANULOCYTES NFR BLD AUTO: 1 % (ref 0–0.5)
LYMPHOCYTES # BLD: 1 K/UL (ref 0.8–3.5)
LYMPHOCYTES NFR BLD: 22 % (ref 12–49)
MAGNESIUM SERPL-MCNC: 2.2 MG/DL (ref 1.6–2.4)
MCH RBC QN AUTO: 31.6 PG (ref 26–34)
MCHC RBC AUTO-ENTMCNC: 33.8 G/DL (ref 30–36.5)
MCV RBC AUTO: 93.5 FL (ref 80–99)
MONOCYTES # BLD: 0.4 K/UL (ref 0–1)
MONOCYTES NFR BLD: 8 % (ref 5–13)
NEUTS SEG # BLD: 3 K/UL (ref 1.8–8)
NEUTS SEG NFR BLD: 62 % (ref 32–75)
NRBC # BLD: 0 K/UL (ref 0–0.01)
NRBC BLD-RTO: 0 PER 100 WBC
PLATELET # BLD AUTO: 60 K/UL (ref 150–400)
PMV BLD AUTO: 11.9 FL (ref 8.9–12.9)
POTASSIUM SERPL-SCNC: 2.8 MMOL/L (ref 3.5–5.1)
POTASSIUM SERPL-SCNC: 3.8 MMOL/L (ref 3.5–5.1)
PROT SERPL-MCNC: 6.4 G/DL (ref 6.4–8.2)
RBC # BLD AUTO: 3.67 M/UL (ref 4.1–5.7)
SODIUM SERPL-SCNC: 141 MMOL/L (ref 136–145)
SODIUM SERPL-SCNC: 142 MMOL/L (ref 136–145)
WBC # BLD AUTO: 4.7 K/UL (ref 4.1–11.1)

## 2024-05-30 PROCEDURE — 6370000000 HC RX 637 (ALT 250 FOR IP): Performed by: STUDENT IN AN ORGANIZED HEALTH CARE EDUCATION/TRAINING PROGRAM

## 2024-05-30 PROCEDURE — 36415 COLL VENOUS BLD VENIPUNCTURE: CPT

## 2024-05-30 PROCEDURE — 1100000000 HC RM PRIVATE

## 2024-05-30 PROCEDURE — 85025 COMPLETE CBC W/AUTO DIFF WBC: CPT

## 2024-05-30 PROCEDURE — 6360000002 HC RX W HCPCS: Performed by: STUDENT IN AN ORGANIZED HEALTH CARE EDUCATION/TRAINING PROGRAM

## 2024-05-30 PROCEDURE — 80048 BASIC METABOLIC PNL TOTAL CA: CPT

## 2024-05-30 PROCEDURE — 83735 ASSAY OF MAGNESIUM: CPT

## 2024-05-30 PROCEDURE — 80053 COMPREHEN METABOLIC PANEL: CPT

## 2024-05-30 PROCEDURE — 97530 THERAPEUTIC ACTIVITIES: CPT

## 2024-05-30 PROCEDURE — 2580000003 HC RX 258: Performed by: INTERNAL MEDICINE

## 2024-05-30 PROCEDURE — 6370000000 HC RX 637 (ALT 250 FOR IP): Performed by: INTERNAL MEDICINE

## 2024-05-30 RX ORDER — POTASSIUM CHLORIDE 7.45 MG/ML
10 INJECTION INTRAVENOUS
Status: DISCONTINUED | OUTPATIENT
Start: 2024-05-30 | End: 2024-05-30

## 2024-05-30 RX ORDER — TRAMADOL HYDROCHLORIDE 50 MG/1
50 TABLET ORAL 2 TIMES DAILY
Status: DISCONTINUED | OUTPATIENT
Start: 2024-05-30 | End: 2024-06-02

## 2024-05-30 RX ADMIN — METOPROLOL TARTRATE 25 MG: 25 TABLET, FILM COATED ORAL at 09:13

## 2024-05-30 RX ADMIN — SODIUM CHLORIDE, PRESERVATIVE FREE 10 ML: 5 INJECTION INTRAVENOUS at 20:46

## 2024-05-30 RX ADMIN — IBUPROFEN 200 MG: 100 SUSPENSION ORAL at 00:09

## 2024-05-30 RX ADMIN — LATANOPROST 1 DROP: 50 SOLUTION OPHTHALMIC at 20:40

## 2024-05-30 RX ADMIN — ASPIRIN 81 MG 81 MG: 81 TABLET ORAL at 09:14

## 2024-05-30 RX ADMIN — METOPROLOL TARTRATE 25 MG: 25 TABLET, FILM COATED ORAL at 20:38

## 2024-05-30 RX ADMIN — DORZOLAMIDE HYDROCHLORIDE 1 DROP: 20 SOLUTION/ DROPS OPHTHALMIC at 09:14

## 2024-05-30 RX ADMIN — TIMOLOL MALEATE 1 DROP: 5 SOLUTION OPHTHALMIC at 09:14

## 2024-05-30 RX ADMIN — POTASSIUM BICARBONATE 40 MEQ: 782 TABLET, EFFERVESCENT ORAL at 23:54

## 2024-05-30 RX ADMIN — TRAMADOL HYDROCHLORIDE 50 MG: 50 TABLET ORAL at 14:36

## 2024-05-30 RX ADMIN — LISINOPRIL 10 MG: 10 TABLET ORAL at 09:14

## 2024-05-30 RX ADMIN — POTASSIUM BICARBONATE 40 MEQ: 782 TABLET, EFFERVESCENT ORAL at 13:10

## 2024-05-30 RX ADMIN — INSULIN GLARGINE 34 UNITS: 100 INJECTION, SOLUTION SUBCUTANEOUS at 20:38

## 2024-05-30 RX ADMIN — POTASSIUM CHLORIDE 10 MEQ: 7.46 INJECTION, SOLUTION INTRAVENOUS at 13:10

## 2024-05-30 RX ADMIN — FINASTERIDE 5 MG: 5 TABLET, FILM COATED ORAL at 09:14

## 2024-05-30 RX ADMIN — TRAMADOL HYDROCHLORIDE 50 MG: 50 TABLET ORAL at 20:38

## 2024-05-30 ASSESSMENT — PAIN SCALES - WONG BAKER
WONGBAKER_NUMERICALRESPONSE: NO HURT
WONGBAKER_NUMERICALRESPONSE: NO HURT

## 2024-05-30 ASSESSMENT — PAIN DESCRIPTION - ORIENTATION
ORIENTATION: RIGHT
ORIENTATION: LEFT

## 2024-05-30 ASSESSMENT — PAIN DESCRIPTION - DESCRIPTORS
DESCRIPTORS: ACHING
DESCRIPTORS: ACHING
DESCRIPTORS: ACHING;DISCOMFORT

## 2024-05-30 ASSESSMENT — PAIN SCALES - GENERAL
PAINLEVEL_OUTOF10: 0
PAINLEVEL_OUTOF10: 0
PAINLEVEL_OUTOF10: 5
PAINLEVEL_OUTOF10: 5
PAINLEVEL_OUTOF10: 0
PAINLEVEL_OUTOF10: 0

## 2024-05-30 ASSESSMENT — PAIN DESCRIPTION - LOCATION
LOCATION: HEAD
LOCATION: SHOULDER
LOCATION: ABDOMEN

## 2024-05-30 ASSESSMENT — PAIN - FUNCTIONAL ASSESSMENT
PAIN_FUNCTIONAL_ASSESSMENT: PREVENTS OR INTERFERES SOME ACTIVE ACTIVITIES AND ADLS
PAIN_FUNCTIONAL_ASSESSMENT: ACTIVITIES ARE NOT PREVENTED

## 2024-05-30 NOTE — CARE COORDINATION
Cm placed call to [facility] Admissions dept to follow up on referral.     VM left for admissions director, Marlen Roca, requesting update on acceptance.     1315: CM spoke with Mrs. Dalal. Advised her of no response from [facility] and requested additional choices for SNF.  Mrs. Dalal stated she will work on additional choices and let this CM know soon. CM provided direct contact number for return call.     1425: Received return call from Mrs. Dalal. She is at a loss. As we were speaking, she received an inbound call from [facility], she will call me back. Call ended.    Received response in [facility] from [facility]. CM responded and asked again if able to accept patient (pending auth)    1440: Per Mrs. Dalal, after speaking with admissions at [facility], she has decided to resume HH services with [facility]. Referral sent.

## 2024-05-31 LAB
ALBUMIN SERPL-MCNC: 2.3 G/DL (ref 3.5–5)
ALBUMIN/GLOB SERPL: 0.6 (ref 1.1–2.2)
ALP SERPL-CCNC: 176 U/L (ref 45–117)
ALT SERPL-CCNC: 58 U/L (ref 12–78)
ANION GAP SERPL CALC-SCNC: 4 MMOL/L (ref 5–15)
AST SERPL W P-5'-P-CCNC: 39 U/L (ref 15–37)
BASOPHILS # BLD: 0.1 K/UL (ref 0–0.1)
BASOPHILS NFR BLD: 2 % (ref 0–1)
BILIRUB SERPL-MCNC: 0.8 MG/DL (ref 0.2–1)
BUN SERPL-MCNC: 20 MG/DL (ref 6–20)
BUN/CREAT SERPL: 26 (ref 12–20)
CA-I BLD-MCNC: 8.6 MG/DL (ref 8.5–10.1)
CHLORIDE SERPL-SCNC: 111 MMOL/L (ref 97–108)
CO2 SERPL-SCNC: 24 MMOL/L (ref 21–32)
CREAT SERPL-MCNC: 0.78 MG/DL (ref 0.7–1.3)
DIFFERENTIAL METHOD BLD: ABNORMAL
EOSINOPHIL # BLD: 0.3 K/UL (ref 0–0.4)
EOSINOPHIL NFR BLD: 7 % (ref 0–7)
ERYTHROCYTE [DISTWIDTH] IN BLOOD BY AUTOMATED COUNT: 14.1 % (ref 11.5–14.5)
GLOBULIN SER CALC-MCNC: 4.1 G/DL (ref 2–4)
GLUCOSE SERPL-MCNC: 136 MG/DL (ref 65–100)
HCT VFR BLD AUTO: 33.1 % (ref 36.6–50.3)
HGB BLD-MCNC: 11 G/DL (ref 12.1–17)
IMM GRANULOCYTES # BLD AUTO: 0 K/UL (ref 0–0.04)
IMM GRANULOCYTES NFR BLD AUTO: 1 % (ref 0–0.5)
LYMPHOCYTES # BLD: 1 K/UL (ref 0.8–3.5)
LYMPHOCYTES NFR BLD: 24 % (ref 12–49)
MAGNESIUM SERPL-MCNC: 2.1 MG/DL (ref 1.6–2.4)
MCH RBC QN AUTO: 31.8 PG (ref 26–34)
MCHC RBC AUTO-ENTMCNC: 33.2 G/DL (ref 30–36.5)
MCV RBC AUTO: 95.7 FL (ref 80–99)
MONOCYTES # BLD: 0.4 K/UL (ref 0–1)
MONOCYTES NFR BLD: 10 % (ref 5–13)
NEUTS SEG # BLD: 2.5 K/UL (ref 1.8–8)
NEUTS SEG NFR BLD: 56 % (ref 32–75)
NRBC # BLD: 0 K/UL (ref 0–0.01)
NRBC BLD-RTO: 0 PER 100 WBC
PLATELET # BLD AUTO: 63 K/UL (ref 150–400)
PMV BLD AUTO: 11.3 FL (ref 8.9–12.9)
POTASSIUM SERPL-SCNC: 3.7 MMOL/L (ref 3.5–5.1)
PROT SERPL-MCNC: 6.4 G/DL (ref 6.4–8.2)
RBC # BLD AUTO: 3.46 M/UL (ref 4.1–5.7)
SODIUM SERPL-SCNC: 139 MMOL/L (ref 136–145)
WBC # BLD AUTO: 4.3 K/UL (ref 4.1–11.1)

## 2024-05-31 PROCEDURE — 80053 COMPREHEN METABOLIC PANEL: CPT

## 2024-05-31 PROCEDURE — 1100000000 HC RM PRIVATE

## 2024-05-31 PROCEDURE — 85025 COMPLETE CBC W/AUTO DIFF WBC: CPT

## 2024-05-31 PROCEDURE — 6370000000 HC RX 637 (ALT 250 FOR IP): Performed by: STUDENT IN AN ORGANIZED HEALTH CARE EDUCATION/TRAINING PROGRAM

## 2024-05-31 PROCEDURE — 36415 COLL VENOUS BLD VENIPUNCTURE: CPT

## 2024-05-31 PROCEDURE — 83735 ASSAY OF MAGNESIUM: CPT

## 2024-05-31 PROCEDURE — 6370000000 HC RX 637 (ALT 250 FOR IP): Performed by: INTERNAL MEDICINE

## 2024-05-31 PROCEDURE — 2580000003 HC RX 258: Performed by: INTERNAL MEDICINE

## 2024-05-31 RX ADMIN — TRAMADOL HYDROCHLORIDE 50 MG: 50 TABLET ORAL at 18:11

## 2024-05-31 RX ADMIN — LISINOPRIL 10 MG: 10 TABLET ORAL at 09:04

## 2024-05-31 RX ADMIN — ASPIRIN 81 MG 81 MG: 81 TABLET ORAL at 09:04

## 2024-05-31 RX ADMIN — INSULIN GLARGINE 34 UNITS: 100 INJECTION, SOLUTION SUBCUTANEOUS at 22:09

## 2024-05-31 RX ADMIN — SODIUM CHLORIDE, PRESERVATIVE FREE 10 ML: 5 INJECTION INTRAVENOUS at 22:14

## 2024-05-31 RX ADMIN — IBUPROFEN 200 MG: 100 SUSPENSION ORAL at 04:54

## 2024-05-31 RX ADMIN — METOPROLOL TARTRATE 25 MG: 25 TABLET, FILM COATED ORAL at 09:05

## 2024-05-31 RX ADMIN — TIMOLOL MALEATE 1 DROP: 5 SOLUTION OPHTHALMIC at 09:05

## 2024-05-31 RX ADMIN — DORZOLAMIDE HYDROCHLORIDE 1 DROP: 20 SOLUTION/ DROPS OPHTHALMIC at 09:05

## 2024-05-31 RX ADMIN — INSULIN LISPRO 2 UNITS: 100 INJECTION, SOLUTION INTRAVENOUS; SUBCUTANEOUS at 13:00

## 2024-05-31 RX ADMIN — LATANOPROST 1 DROP: 50 SOLUTION OPHTHALMIC at 23:17

## 2024-05-31 RX ADMIN — METOPROLOL TARTRATE 25 MG: 25 TABLET, FILM COATED ORAL at 22:13

## 2024-05-31 RX ADMIN — FINASTERIDE 5 MG: 5 TABLET, FILM COATED ORAL at 09:05

## 2024-05-31 ASSESSMENT — PAIN SCALES - WONG BAKER: WONGBAKER_NUMERICALRESPONSE: NO HURT

## 2024-05-31 ASSESSMENT — PAIN DESCRIPTION - DESCRIPTORS
DESCRIPTORS: ACHING
DESCRIPTORS: DISCOMFORT;ACHING

## 2024-05-31 ASSESSMENT — PAIN SCALES - GENERAL
PAINLEVEL_OUTOF10: 4
PAINLEVEL_OUTOF10: 6
PAINLEVEL_OUTOF10: 0

## 2024-05-31 ASSESSMENT — PAIN DESCRIPTION - LOCATION
LOCATION: FOOT
LOCATION: SHOULDER

## 2024-05-31 ASSESSMENT — PAIN DESCRIPTION - ORIENTATION: ORIENTATION: LEFT

## 2024-05-31 NOTE — CARE COORDINATION
1000: RENAE spoke with Mrs. Dalal this morning. Current plan is to pursue [facility]. She was referred to [facility] by home health therapist Bharti. She has spoken with Howard who stated he will speak with [facility] about patient and rehab needs.    This CM has requested update on acceptance and insurance auth initiation (messaged in CarePort and VM left for CHRISTINE in Admissions)    [facility] accepted for resumption of care.--Anticipated Start of Care 06-      1315: [facility] is able to accept patient for skilled care. Auth will be started today.  RENAE spoke with Mrs. Dalal and provided update on acceptance. We touched base on LTC as well. She is not at that point. Requesting skilled care at this time.

## 2024-05-31 NOTE — CONSULTS
Comprehensive Nutrition Assessment    Type and Reason for Visit:  Consult    Nutrition Recommendations/Plan:   Continue with TF and Prosource  Monitor TF tolerance, weight and labs.      Malnutrition Assessment:  Malnutrition Status:  No malnutrition (05/25/24 1850)    Context:  Chronic Illness     Findings of the 6 clinical characteristics of malnutrition:  Energy Intake:  No significant decrease in energy intake  Weight Loss:  No significant weight loss     Body Fat Loss:  Unable to assess     Muscle Mass Loss:  Unable to assess    Fluid Accumulation:  No significant fluid accumulation     Strength:  Not Performed    Nutrition Assessment:    Pt admitted w/ abdominal pain +C, pt w/ chronic PEG 2/2 dysphagia. Pt w/ previous poor appetite after CVA that resulted in PEG placement. Pt getting bolus feeds at home with protein supplement. RD to adjust regimen ordered to better meet needs and prevent over feeding. Will evaluate home reg. at f/u. Meds and labs reviewed, will follow per protocol. 05/29/24 Follow up: Continues on Bolus feeding, tolerates, no residuals, H20 flush icreased to 400 ml every 6 hours, TF meets >80% of caloric need. Review meds and labs. No new weight.      05/31/24 MD Consult: Met with patient and wife, wife had questions related to protein modular, Prosource, stated that she had been supplementing the patients TF at home with an OTC high protein shake, stated that it caused the constipation and was concerned that the Prosource would do the same. Discussed at length the diffences between the modular and the protein shake, also reinforced the need to monitor patients BMs to avoid constipation in the future. Wife was enged in the conversation and verbalized understanding the need for extra protein. Wife is requesting that Prosouce be prescribed to patient at discharge. RD stated that the MD would be made aware of the request.    Nutrition Related Findings:    NFPE deferred, +PEG 2/2 dysphagia.

## 2024-06-01 LAB
ALBUMIN SERPL-MCNC: 2.3 G/DL (ref 3.5–5)
ALBUMIN/GLOB SERPL: 0.6 (ref 1.1–2.2)
ALP SERPL-CCNC: 185 U/L (ref 45–117)
ALT SERPL-CCNC: 58 U/L (ref 12–78)
ANION GAP SERPL CALC-SCNC: 6 MMOL/L (ref 5–15)
AST SERPL W P-5'-P-CCNC: 38 U/L (ref 15–37)
BASOPHILS # BLD: 0.1 K/UL (ref 0–0.1)
BASOPHILS NFR BLD: 1 % (ref 0–1)
BILIRUB SERPL-MCNC: 0.8 MG/DL (ref 0.2–1)
BUN SERPL-MCNC: 19 MG/DL (ref 6–20)
BUN/CREAT SERPL: 28 (ref 12–20)
CA-I BLD-MCNC: 8.8 MG/DL (ref 8.5–10.1)
CHLORIDE SERPL-SCNC: 108 MMOL/L (ref 97–108)
CO2 SERPL-SCNC: 24 MMOL/L (ref 21–32)
CREAT SERPL-MCNC: 0.68 MG/DL (ref 0.7–1.3)
DIFFERENTIAL METHOD BLD: ABNORMAL
EOSINOPHIL # BLD: 0.3 K/UL (ref 0–0.4)
EOSINOPHIL NFR BLD: 7 % (ref 0–7)
ERYTHROCYTE [DISTWIDTH] IN BLOOD BY AUTOMATED COUNT: 14 % (ref 11.5–14.5)
GLOBULIN SER CALC-MCNC: 4.1 G/DL (ref 2–4)
GLUCOSE SERPL-MCNC: 109 MG/DL (ref 65–100)
HCT VFR BLD AUTO: 33.7 % (ref 36.6–50.3)
HGB BLD-MCNC: 11.2 G/DL (ref 12.1–17)
IMM GRANULOCYTES # BLD AUTO: 0 K/UL (ref 0–0.04)
IMM GRANULOCYTES NFR BLD AUTO: 1 % (ref 0–0.5)
LYMPHOCYTES # BLD: 1.1 K/UL (ref 0.8–3.5)
LYMPHOCYTES NFR BLD: 26 % (ref 12–49)
MAGNESIUM SERPL-MCNC: 2.1 MG/DL (ref 1.6–2.4)
MCH RBC QN AUTO: 31.6 PG (ref 26–34)
MCHC RBC AUTO-ENTMCNC: 33.2 G/DL (ref 30–36.5)
MCV RBC AUTO: 95.2 FL (ref 80–99)
MONOCYTES # BLD: 0.4 K/UL (ref 0–1)
MONOCYTES NFR BLD: 10 % (ref 5–13)
NEUTS SEG # BLD: 2.4 K/UL (ref 1.8–8)
NEUTS SEG NFR BLD: 55 % (ref 32–75)
NRBC # BLD: 0 K/UL (ref 0–0.01)
NRBC BLD-RTO: 0 PER 100 WBC
PLATELET # BLD AUTO: 60 K/UL (ref 150–400)
PMV BLD AUTO: 12.6 FL (ref 8.9–12.9)
POTASSIUM SERPL-SCNC: 3.6 MMOL/L (ref 3.5–5.1)
PROT SERPL-MCNC: 6.4 G/DL (ref 6.4–8.2)
RBC # BLD AUTO: 3.54 M/UL (ref 4.1–5.7)
SODIUM SERPL-SCNC: 138 MMOL/L (ref 136–145)
WBC # BLD AUTO: 4.3 K/UL (ref 4.1–11.1)

## 2024-06-01 PROCEDURE — 85025 COMPLETE CBC W/AUTO DIFF WBC: CPT

## 2024-06-01 PROCEDURE — 6370000000 HC RX 637 (ALT 250 FOR IP): Performed by: STUDENT IN AN ORGANIZED HEALTH CARE EDUCATION/TRAINING PROGRAM

## 2024-06-01 PROCEDURE — 83735 ASSAY OF MAGNESIUM: CPT

## 2024-06-01 PROCEDURE — 6360000002 HC RX W HCPCS: Performed by: INTERNAL MEDICINE

## 2024-06-01 PROCEDURE — 6370000000 HC RX 637 (ALT 250 FOR IP): Performed by: INTERNAL MEDICINE

## 2024-06-01 PROCEDURE — 2580000003 HC RX 258: Performed by: INTERNAL MEDICINE

## 2024-06-01 PROCEDURE — 1100000000 HC RM PRIVATE

## 2024-06-01 PROCEDURE — 36415 COLL VENOUS BLD VENIPUNCTURE: CPT

## 2024-06-01 PROCEDURE — 80053 COMPREHEN METABOLIC PANEL: CPT

## 2024-06-01 RX ADMIN — TRAMADOL HYDROCHLORIDE 50 MG: 50 TABLET ORAL at 18:06

## 2024-06-01 RX ADMIN — TRAMADOL HYDROCHLORIDE 50 MG: 50 TABLET ORAL at 21:53

## 2024-06-01 RX ADMIN — LISINOPRIL 10 MG: 10 TABLET ORAL at 08:58

## 2024-06-01 RX ADMIN — LATANOPROST 1 DROP: 50 SOLUTION OPHTHALMIC at 21:52

## 2024-06-01 RX ADMIN — METOPROLOL TARTRATE 25 MG: 25 TABLET, FILM COATED ORAL at 08:58

## 2024-06-01 RX ADMIN — ASPIRIN 81 MG 81 MG: 81 TABLET ORAL at 08:58

## 2024-06-01 RX ADMIN — ACETAMINOPHEN 650 MG: 325 TABLET ORAL at 11:09

## 2024-06-01 RX ADMIN — TRAMADOL HYDROCHLORIDE 50 MG: 50 TABLET ORAL at 00:10

## 2024-06-01 RX ADMIN — METOPROLOL TARTRATE 25 MG: 25 TABLET, FILM COATED ORAL at 21:53

## 2024-06-01 RX ADMIN — FINASTERIDE 5 MG: 5 TABLET, FILM COATED ORAL at 08:58

## 2024-06-01 RX ADMIN — SODIUM CHLORIDE, PRESERVATIVE FREE 10 ML: 5 INJECTION INTRAVENOUS at 21:53

## 2024-06-01 RX ADMIN — TIMOLOL MALEATE 1 DROP: 5 SOLUTION OPHTHALMIC at 08:59

## 2024-06-01 RX ADMIN — ENOXAPARIN SODIUM 30 MG: 100 INJECTION SUBCUTANEOUS at 21:55

## 2024-06-01 RX ADMIN — INSULIN GLARGINE 34 UNITS: 100 INJECTION, SOLUTION SUBCUTANEOUS at 21:55

## 2024-06-01 RX ADMIN — DORZOLAMIDE HYDROCHLORIDE 1 DROP: 20 SOLUTION/ DROPS OPHTHALMIC at 08:59

## 2024-06-01 RX ADMIN — SODIUM CHLORIDE, PRESERVATIVE FREE 10 ML: 5 INJECTION INTRAVENOUS at 08:59

## 2024-06-01 ASSESSMENT — PAIN DESCRIPTION - LOCATION
LOCATION: ABDOMEN
LOCATION: SHOULDER

## 2024-06-01 ASSESSMENT — PAIN DESCRIPTION - DESCRIPTORS
DESCRIPTORS: ACHING
DESCRIPTORS: ACHING

## 2024-06-01 ASSESSMENT — PAIN SCALES - GENERAL
PAINLEVEL_OUTOF10: 3
PAINLEVEL_OUTOF10: 4
PAINLEVEL_OUTOF10: 3
PAINLEVEL_OUTOF10: 4

## 2024-06-01 ASSESSMENT — PAIN DESCRIPTION - ORIENTATION
ORIENTATION: ANTERIOR
ORIENTATION: LEFT

## 2024-06-02 PROCEDURE — 6370000000 HC RX 637 (ALT 250 FOR IP): Performed by: INTERNAL MEDICINE

## 2024-06-02 PROCEDURE — 2580000003 HC RX 258: Performed by: INTERNAL MEDICINE

## 2024-06-02 PROCEDURE — 6370000000 HC RX 637 (ALT 250 FOR IP): Performed by: STUDENT IN AN ORGANIZED HEALTH CARE EDUCATION/TRAINING PROGRAM

## 2024-06-02 PROCEDURE — 1100000000 HC RM PRIVATE

## 2024-06-02 RX ORDER — TRAMADOL HYDROCHLORIDE 50 MG/1
50 TABLET ORAL EVERY 6 HOURS PRN
Status: DISCONTINUED | OUTPATIENT
Start: 2024-06-02 | End: 2024-06-06 | Stop reason: HOSPADM

## 2024-06-02 RX ADMIN — TRAMADOL HYDROCHLORIDE 50 MG: 50 TABLET ORAL at 17:59

## 2024-06-02 RX ADMIN — SODIUM CHLORIDE, PRESERVATIVE FREE 10 ML: 5 INJECTION INTRAVENOUS at 21:27

## 2024-06-02 RX ADMIN — DORZOLAMIDE HYDROCHLORIDE 1 DROP: 20 SOLUTION/ DROPS OPHTHALMIC at 09:17

## 2024-06-02 RX ADMIN — METOPROLOL TARTRATE 25 MG: 25 TABLET, FILM COATED ORAL at 09:17

## 2024-06-02 RX ADMIN — INSULIN GLARGINE 34 UNITS: 100 INJECTION, SOLUTION SUBCUTANEOUS at 21:24

## 2024-06-02 RX ADMIN — METOPROLOL TARTRATE 25 MG: 25 TABLET, FILM COATED ORAL at 21:26

## 2024-06-02 RX ADMIN — FINASTERIDE 5 MG: 5 TABLET, FILM COATED ORAL at 09:17

## 2024-06-02 RX ADMIN — ASPIRIN 81 MG 81 MG: 81 TABLET ORAL at 09:17

## 2024-06-02 RX ADMIN — TIMOLOL MALEATE 1 DROP: 5 SOLUTION OPHTHALMIC at 09:17

## 2024-06-02 RX ADMIN — LATANOPROST 1 DROP: 50 SOLUTION OPHTHALMIC at 21:31

## 2024-06-02 RX ADMIN — LISINOPRIL 10 MG: 10 TABLET ORAL at 09:17

## 2024-06-03 PROCEDURE — 6370000000 HC RX 637 (ALT 250 FOR IP): Performed by: STUDENT IN AN ORGANIZED HEALTH CARE EDUCATION/TRAINING PROGRAM

## 2024-06-03 PROCEDURE — 1100000000 HC RM PRIVATE

## 2024-06-03 PROCEDURE — 97535 SELF CARE MNGMENT TRAINING: CPT

## 2024-06-03 PROCEDURE — 2580000003 HC RX 258: Performed by: INTERNAL MEDICINE

## 2024-06-03 PROCEDURE — 97530 THERAPEUTIC ACTIVITIES: CPT

## 2024-06-03 PROCEDURE — 6370000000 HC RX 637 (ALT 250 FOR IP): Performed by: INTERNAL MEDICINE

## 2024-06-03 RX ORDER — LANOLIN ALCOHOL/MO/W.PET/CERES
9 CREAM (GRAM) TOPICAL NIGHTLY PRN
Qty: 90 TABLET | Refills: 0 | Status: SHIPPED | OUTPATIENT
Start: 2024-06-03

## 2024-06-03 RX ORDER — FINASTERIDE 5 MG/1
5 TABLET, FILM COATED ORAL DAILY
Qty: 30 TABLET | Refills: 3 | Status: SHIPPED | OUTPATIENT
Start: 2024-06-04

## 2024-06-03 RX ADMIN — METOPROLOL TARTRATE 25 MG: 25 TABLET, FILM COATED ORAL at 10:12

## 2024-06-03 RX ADMIN — ASPIRIN 81 MG 81 MG: 81 TABLET ORAL at 10:12

## 2024-06-03 RX ADMIN — LATANOPROST 1 DROP: 50 SOLUTION OPHTHALMIC at 22:28

## 2024-06-03 RX ADMIN — TRAMADOL HYDROCHLORIDE 50 MG: 50 TABLET ORAL at 00:10

## 2024-06-03 RX ADMIN — SODIUM CHLORIDE, PRESERVATIVE FREE 10 ML: 5 INJECTION INTRAVENOUS at 22:28

## 2024-06-03 RX ADMIN — FINASTERIDE 5 MG: 5 TABLET, FILM COATED ORAL at 10:12

## 2024-06-03 RX ADMIN — SODIUM CHLORIDE, PRESERVATIVE FREE 10 ML: 5 INJECTION INTRAVENOUS at 10:17

## 2024-06-03 RX ADMIN — DORZOLAMIDE HYDROCHLORIDE 1 DROP: 20 SOLUTION/ DROPS OPHTHALMIC at 10:16

## 2024-06-03 RX ADMIN — INSULIN GLARGINE 34 UNITS: 100 INJECTION, SOLUTION SUBCUTANEOUS at 22:15

## 2024-06-03 RX ADMIN — METOPROLOL TARTRATE 25 MG: 25 TABLET, FILM COATED ORAL at 20:37

## 2024-06-03 RX ADMIN — LISINOPRIL 10 MG: 10 TABLET ORAL at 10:12

## 2024-06-03 RX ADMIN — TIMOLOL MALEATE 1 DROP: 5 SOLUTION OPHTHALMIC at 10:18

## 2024-06-03 RX ADMIN — TRAMADOL HYDROCHLORIDE 50 MG: 50 TABLET ORAL at 22:15

## 2024-06-03 ASSESSMENT — PAIN - FUNCTIONAL ASSESSMENT
PAIN_FUNCTIONAL_ASSESSMENT: ACTIVITIES ARE NOT PREVENTED
PAIN_FUNCTIONAL_ASSESSMENT: PREVENTS OR INTERFERES SOME ACTIVE ACTIVITIES AND ADLS

## 2024-06-03 ASSESSMENT — PAIN SCALES - GENERAL
PAINLEVEL_OUTOF10: 6
PAINLEVEL_OUTOF10: 3
PAINLEVEL_OUTOF10: 5
PAINLEVEL_OUTOF10: 0
PAINLEVEL_OUTOF10: 3

## 2024-06-03 ASSESSMENT — PAIN DESCRIPTION - LOCATION
LOCATION: ABDOMEN
LOCATION: ABDOMEN

## 2024-06-03 ASSESSMENT — PAIN DESCRIPTION - ORIENTATION
ORIENTATION: ANTERIOR
ORIENTATION: ANTERIOR

## 2024-06-03 ASSESSMENT — PAIN DESCRIPTION - DESCRIPTORS
DESCRIPTORS: ACHING
DESCRIPTORS: ACHING

## 2024-06-03 ASSESSMENT — PAIN SCALES - WONG BAKER: WONGBAKER_NUMERICALRESPONSE: NO HURT

## 2024-06-03 NOTE — CARE COORDINATION
CM reviewed chart    DCP: [facility]. Auth started Friday 5/31.     [facility] liaison requested updated PT/OT notes for auth. Therapy team notified.

## 2024-06-04 PROCEDURE — 6370000000 HC RX 637 (ALT 250 FOR IP): Performed by: STUDENT IN AN ORGANIZED HEALTH CARE EDUCATION/TRAINING PROGRAM

## 2024-06-04 PROCEDURE — 6370000000 HC RX 637 (ALT 250 FOR IP): Performed by: INTERNAL MEDICINE

## 2024-06-04 PROCEDURE — 2580000003 HC RX 258: Performed by: INTERNAL MEDICINE

## 2024-06-04 PROCEDURE — 1100000000 HC RM PRIVATE

## 2024-06-04 RX ADMIN — ASPIRIN 81 MG 81 MG: 81 TABLET ORAL at 09:34

## 2024-06-04 RX ADMIN — DORZOLAMIDE HYDROCHLORIDE 1 DROP: 20 SOLUTION/ DROPS OPHTHALMIC at 09:44

## 2024-06-04 RX ADMIN — SODIUM CHLORIDE, PRESERVATIVE FREE 10 ML: 5 INJECTION INTRAVENOUS at 21:26

## 2024-06-04 RX ADMIN — METOPROLOL TARTRATE 25 MG: 25 TABLET, FILM COATED ORAL at 21:26

## 2024-06-04 RX ADMIN — LISINOPRIL 10 MG: 10 TABLET ORAL at 09:34

## 2024-06-04 RX ADMIN — TIMOLOL MALEATE 1 DROP: 5 SOLUTION OPHTHALMIC at 09:45

## 2024-06-04 RX ADMIN — TRAMADOL HYDROCHLORIDE 50 MG: 50 TABLET ORAL at 21:37

## 2024-06-04 RX ADMIN — FINASTERIDE 5 MG: 5 TABLET, FILM COATED ORAL at 09:34

## 2024-06-04 RX ADMIN — INSULIN GLARGINE 34 UNITS: 100 INJECTION, SOLUTION SUBCUTANEOUS at 21:26

## 2024-06-04 RX ADMIN — METOPROLOL TARTRATE 25 MG: 25 TABLET, FILM COATED ORAL at 09:34

## 2024-06-04 RX ADMIN — SODIUM CHLORIDE, PRESERVATIVE FREE 10 ML: 5 INJECTION INTRAVENOUS at 09:47

## 2024-06-04 RX ADMIN — TRAMADOL HYDROCHLORIDE 50 MG: 50 TABLET ORAL at 03:21

## 2024-06-04 RX ADMIN — TRAMADOL HYDROCHLORIDE 50 MG: 50 TABLET ORAL at 15:02

## 2024-06-04 RX ADMIN — LATANOPROST 1 DROP: 50 SOLUTION OPHTHALMIC at 21:45

## 2024-06-04 ASSESSMENT — PAIN SCALES - GENERAL
PAINLEVEL_OUTOF10: 5
PAINLEVEL_OUTOF10: 7
PAINLEVEL_OUTOF10: 0
PAINLEVEL_OUTOF10: 3
PAINLEVEL_OUTOF10: 0

## 2024-06-04 ASSESSMENT — PAIN DESCRIPTION - DESCRIPTORS
DESCRIPTORS: BURNING
DESCRIPTORS: ACHING
DESCRIPTORS: DISCOMFORT

## 2024-06-04 ASSESSMENT — PAIN SCALES - WONG BAKER
WONGBAKER_NUMERICALRESPONSE: NO HURT
WONGBAKER_NUMERICALRESPONSE: NO HURT

## 2024-06-04 ASSESSMENT — PAIN - FUNCTIONAL ASSESSMENT: PAIN_FUNCTIONAL_ASSESSMENT: ACTIVITIES ARE NOT PREVENTED

## 2024-06-04 ASSESSMENT — PAIN DESCRIPTION - LOCATION
LOCATION: ABDOMEN
LOCATION: LEG;BACK
LOCATION: NECK

## 2024-06-04 ASSESSMENT — PAIN DESCRIPTION - ORIENTATION
ORIENTATION: LEFT
ORIENTATION: ANTERIOR

## 2024-06-04 NOTE — CARE COORDINATION
CM received call from Shira on behalf of [facility] requesting updated PT/OT notes be faxed to her at  [phone]. Request completed.

## 2024-06-05 LAB
APPEARANCE UR: ABNORMAL
BACTERIA URNS QL MICRO: NEGATIVE /HPF
BILIRUB UR QL: NEGATIVE
COLOR UR: ABNORMAL
EPITH CASTS URNS QL MICRO: ABNORMAL /LPF
GLUCOSE UR STRIP.AUTO-MCNC: NEGATIVE MG/DL
HGB UR QL STRIP: ABNORMAL
KETONES UR QL STRIP.AUTO: NEGATIVE MG/DL
LEUKOCYTE ESTERASE UR QL STRIP.AUTO: ABNORMAL
NITRITE UR QL STRIP.AUTO: NEGATIVE
PH UR STRIP: 7 (ref 5–8)
PROT UR STRIP-MCNC: NEGATIVE MG/DL
RBC #/AREA URNS HPF: ABNORMAL /HPF (ref 0–5)
SP GR UR REFRACTOMETRY: 1.01 (ref 1–1.03)
URINE CULTURE IF INDICATED: ABNORMAL
UROBILINOGEN UR QL STRIP.AUTO: 4 EU/DL (ref 0.1–1)
WBC URNS QL MICRO: ABNORMAL /HPF (ref 0–4)

## 2024-06-05 PROCEDURE — 2580000003 HC RX 258: Performed by: INTERNAL MEDICINE

## 2024-06-05 PROCEDURE — 6370000000 HC RX 637 (ALT 250 FOR IP): Performed by: STUDENT IN AN ORGANIZED HEALTH CARE EDUCATION/TRAINING PROGRAM

## 2024-06-05 PROCEDURE — 87086 URINE CULTURE/COLONY COUNT: CPT

## 2024-06-05 PROCEDURE — 97530 THERAPEUTIC ACTIVITIES: CPT

## 2024-06-05 PROCEDURE — 81001 URINALYSIS AUTO W/SCOPE: CPT

## 2024-06-05 PROCEDURE — 6370000000 HC RX 637 (ALT 250 FOR IP): Performed by: INTERNAL MEDICINE

## 2024-06-05 PROCEDURE — 1100000000 HC RM PRIVATE

## 2024-06-05 RX ADMIN — DORZOLAMIDE HYDROCHLORIDE 1 DROP: 20 SOLUTION/ DROPS OPHTHALMIC at 09:29

## 2024-06-05 RX ADMIN — SODIUM CHLORIDE, PRESERVATIVE FREE 10 ML: 5 INJECTION INTRAVENOUS at 21:22

## 2024-06-05 RX ADMIN — INSULIN GLARGINE 34 UNITS: 100 INJECTION, SOLUTION SUBCUTANEOUS at 21:20

## 2024-06-05 RX ADMIN — LATANOPROST 1 DROP: 50 SOLUTION OPHTHALMIC at 21:24

## 2024-06-05 RX ADMIN — TRAMADOL HYDROCHLORIDE 50 MG: 50 TABLET ORAL at 15:08

## 2024-06-05 RX ADMIN — TRAMADOL HYDROCHLORIDE 50 MG: 50 TABLET ORAL at 22:00

## 2024-06-05 RX ADMIN — LISINOPRIL 10 MG: 10 TABLET ORAL at 09:28

## 2024-06-05 RX ADMIN — TIMOLOL MALEATE 1 DROP: 5 SOLUTION OPHTHALMIC at 09:34

## 2024-06-05 RX ADMIN — FINASTERIDE 5 MG: 5 TABLET, FILM COATED ORAL at 09:28

## 2024-06-05 RX ADMIN — ASPIRIN 81 MG 81 MG: 81 TABLET ORAL at 09:28

## 2024-06-05 RX ADMIN — SODIUM CHLORIDE, PRESERVATIVE FREE 10 ML: 5 INJECTION INTRAVENOUS at 12:42

## 2024-06-05 RX ADMIN — METOPROLOL TARTRATE 25 MG: 25 TABLET, FILM COATED ORAL at 21:21

## 2024-06-05 RX ADMIN — METOPROLOL TARTRATE 25 MG: 25 TABLET, FILM COATED ORAL at 09:28

## 2024-06-05 RX ADMIN — INSULIN LISPRO 2 UNITS: 100 INJECTION, SOLUTION INTRAVENOUS; SUBCUTANEOUS at 12:47

## 2024-06-05 ASSESSMENT — PAIN DESCRIPTION - LOCATION
LOCATION: BACK;LEG
LOCATION: BACK

## 2024-06-05 ASSESSMENT — PAIN SCALES - WONG BAKER
WONGBAKER_NUMERICALRESPONSE: NO HURT

## 2024-06-05 ASSESSMENT — PAIN SCALES - GENERAL
PAINLEVEL_OUTOF10: 1
PAINLEVEL_OUTOF10: 7
PAINLEVEL_OUTOF10: 0

## 2024-06-05 ASSESSMENT — PAIN - FUNCTIONAL ASSESSMENT: PAIN_FUNCTIONAL_ASSESSMENT: PREVENTS OR INTERFERES SOME ACTIVE ACTIVITIES AND ADLS

## 2024-06-05 ASSESSMENT — PAIN DESCRIPTION - ORIENTATION: ORIENTATION: RIGHT

## 2024-06-05 ASSESSMENT — PAIN DESCRIPTION - DESCRIPTORS: DESCRIPTORS: ACHING

## 2024-06-05 NOTE — CARE COORDINATION
CM reviewed chart     DCP: [facility]. Auth started Friday 5/31. Remains pending.    CM reached out to facility requesting update    CM will continue to follow patient and recs of medical team    0836: Received update: Peer to Peer offered \"[facility]\" called offering. Deadline June 5, 2024 (today) 12 noon....phone [phone] option 5.     Provider notified via PS    1200: P2P completed. Insurance requested therapy notes from [facility] admission and recent home health therapy with [facility].     Spoke with [facility] liaison (Mahesh) and [facility] (Ivon) and requested therapy notes for insurance auth.     Will send notes to [facility] (CHRISTINE) for submission to insurance company.    1415: Update provided to Mrs. Dalal.     1510: Requested therapy notes received from [facility]. Sent to [facility] via [facility].

## 2024-06-06 VITALS
HEIGHT: 72 IN | SYSTOLIC BLOOD PRESSURE: 97 MMHG | WEIGHT: 260 LBS | HEART RATE: 82 BPM | BODY MASS INDEX: 35.21 KG/M2 | RESPIRATION RATE: 18 BRPM | TEMPERATURE: 97.3 F | DIASTOLIC BLOOD PRESSURE: 65 MMHG | OXYGEN SATURATION: 93 %

## 2024-06-06 PROCEDURE — 2580000003 HC RX 258: Performed by: INTERNAL MEDICINE

## 2024-06-06 PROCEDURE — 6370000000 HC RX 637 (ALT 250 FOR IP): Performed by: STUDENT IN AN ORGANIZED HEALTH CARE EDUCATION/TRAINING PROGRAM

## 2024-06-06 PROCEDURE — 6370000000 HC RX 637 (ALT 250 FOR IP): Performed by: INTERNAL MEDICINE

## 2024-06-06 RX ORDER — CIPROFLOXACIN 500 MG/1
500 TABLET, FILM COATED ORAL 2 TIMES DAILY
Qty: 20 TABLET | Refills: 0 | Status: SHIPPED | OUTPATIENT
Start: 2024-06-06 | End: 2024-06-16

## 2024-06-06 RX ADMIN — TIMOLOL MALEATE 1 DROP: 5 SOLUTION OPHTHALMIC at 09:41

## 2024-06-06 RX ADMIN — SODIUM CHLORIDE, PRESERVATIVE FREE 10 ML: 5 INJECTION INTRAVENOUS at 10:57

## 2024-06-06 RX ADMIN — FINASTERIDE 5 MG: 5 TABLET, FILM COATED ORAL at 09:40

## 2024-06-06 RX ADMIN — ASPIRIN 81 MG 81 MG: 81 TABLET ORAL at 09:40

## 2024-06-06 RX ADMIN — DORZOLAMIDE HYDROCHLORIDE 1 DROP: 20 SOLUTION/ DROPS OPHTHALMIC at 09:41

## 2024-06-06 RX ADMIN — METOPROLOL TARTRATE 25 MG: 25 TABLET, FILM COATED ORAL at 09:40

## 2024-06-06 NOTE — DISCHARGE INSTR - COC
Manager/ signature: {Esignature:[id number]}    PHYSICIAN SECTION    Prognosis: {Prognosis:1100160351}    Condition at Discharge: { Patient Condition:862307211}    Rehab Potential (if transferring to Rehab): {Prognosis:0842049054}    Recommended Labs or Other Treatments After Discharge: ***    Physician Certification: I certify the above information and transfer of Florentino Dalal  is necessary for the continuing treatment of the diagnosis listed and that he requires {Admit to Appropriate Level of Care:33422} for {GREATER/LESS:584383121} 30 days.     Update Admission H&P: {CHP DME Changes in HandP:988352200}    PHYSICIAN SIGNATURE:  {Esignature:[id number]}

## 2024-06-06 NOTE — CARE COORDINATION
CM received the following update on P2P:.     Physicians conducted Peer to Peer and determined he does not meet criteria for skilled, this request is denied....the member does have the right to appeal. Phone number is [phone] and appeals fax is [phone] per voicemail. Case number [id number].     Information will be provided to pts spouse asap    DCP: Home w/[facility] home hospice    1000: Update provided to Mrs. Dalal in writing. She is not planning to appeal. She is requesting home hospice consult.  Referral sent to [facility]    1017: [facility] hospice accepted patient. Anticipated Start of Care 06-. Liaison will reach out to Mrs. Dalal.     [facility] transport set for 1 pm.  Mrs. Dalal notified at bedside.     Transition of Care Plan:    RUR: 15%  Prior Level of Functioning: Dependent  Disposition: Home hospice  If SNF or IPR: Date FOC offered:   Date FOC received:   Accepting facility:   Date authorization started with reference number:   Date authorization received and expires:   Follow up appointments: Discharge summary  DME needed: Already owns needed DME  Transportation at discharge: [facility]  /[facility] Medicare/ letter given: N/A  Is patient a  and connected with VA? N/A   If yes, was  transfer form completed and VA notified?   Caregiver Contact: Spouse at bedside  Discharge Caregiver contacted prior to discharge? Spouse at bedside  Care Conference needed? N/A  Barriers to discharge: N/A

## 2024-06-06 NOTE — PLAN OF CARE
PHYSICAL THERAPY TREATMENT     Patient: Florentino Dalal (68 y.o. male)  Date: 5/27/2024  Diagnosis: UTI (urinary tract infection) [N39.0]  Metabolic acidosis [E87.20]  SIRS (systemic inflammatory response syndrome) (HCC) [R65.10]  Acute cystitis without hematuria [N30.00] UTI (urinary tract infection)      Precautions: Modified Diet, Skin, Fall Risk, General Precautions, Bed Alarm                      Recommendations for nursing mobility: Frequent repositioning to prevent skin breakdown, Use of bed/chair alarm for safety, Maxi Move for bed to chair transfers, and Assist x2    In place during session: Saunders Catheter  Chart, physical therapy assessment, plan of care and goals were reviewed.  ASSESSMENT  Patient continues with skilled PT services and is progressing towards goals. Pt semi-supine on R side upon PT arrival, agreeable to session. Pt A&O x 1. (See below for objective details and assist levels).     Overall pt tolerated session fairly well today with complaints of low back pain during transfer only. He was able to follow one-step commands with increased time in order to roll to the L (needed sequencing and planning for R UE and R LE; notable L side neglect). Still Max A x2 for rolling to R. Performed supine to sit at EOB with 1 min in sitting; heavy L lateral lean noted. Pt needed cues for to push L with R UE. Max A required to prevent L lateral loss of trunk balance. Max x2 for return to supine and repositioning in bed. Pt returned to supine with nurse present in room. Will continue to benefit from skilled PT services, and will continue to progress as tolerated. Potential barriers for safe discharge: pt has poor safety awareness, pt has impaired cognition, pt is a high fall risk, and pt is not safe to be alone. Current PT DC recommendation Skilled Nursing Facility once medically appropriate.    GOALS:    Problem: Physical Therapy - Adult  Goal: By Discharge: Performs mobility at highest level of 
         PHYSICAL THERAPY TREATMENT     Patient: Florentino Dalal (68 y.o. male)  Date: 5/28/2024  Diagnosis: UTI (urinary tract infection) [N39.0]  Metabolic acidosis [E87.20]  SIRS (systemic inflammatory response syndrome) (HCC) [R65.10]  Acute cystitis without hematuria [N30.00] UTI (urinary tract infection)      Precautions: Modified Diet, Skin, Fall Risk, General Precautions, Bed Alarm                      Recommendations for nursing mobility: Encourage HEP in prep for ADLs/mobility; see handout for details, Frequent repositioning to prevent skin breakdown, Use of bed/chair alarm for safety, and Maxi Move for bed to chair transfers    In place during session: EKG/telemetry   Chart, physical therapy assessment, plan of care and goals were reviewed.  ASSESSMENT  Patient continues with skilled PT services and is progressing towards goals. Pt received semi supine  upon PT/OT arrival, agreeable to session. Pt A&O x 2. (See below for objective details and assist levels). Pt. Limited with mobility today due to frequency of loose stools throughout session. Pt. Required Max A X 2 for rolling side to side. Pt. Unable to get to side of the bed today due to scrotal pain and frequency of loose stools. Pt. received PROM to mitra Kaufman. Educated wife on UE and LE ROM exercises. Discussed proper positioning as well. Pt. Left on R side with pillows.    Overall pt tolerated session poor today with bed mobility.  Will continue to benefit from skilled PT services, and will continue to progress as tolerated. Potential barriers for safe discharge: pts current support system is unable to meet their requirements for physical assistance, pt has impaired cognition, pt is a high fall risk, pt is not safe to be alone, and concern for pt safely navigating or managing the home environment. Current PT DC recommendation Skilled Nursing Facility once medically appropriate.     Start of Session End of Session   SPO2 (%) 99 98   Heart Rate (BPM) 
         PHYSICAL THERAPY TREATMENT     Patient: Florentino Dalal (68 y.o. male)  Date: 6/5/2024  Diagnosis: UTI (urinary tract infection) [N39.0]  Metabolic acidosis [E87.20]  SIRS (systemic inflammatory response syndrome) (HCC) [R65.10]  Acute cystitis without hematuria [N30.00] UTI (urinary tract infection)      Precautions: Modified Diet, Skin, Fall Risk, General Precautions, Bed Alarm                      Recommendations for nursing mobility: Encourage HEP in prep for ADLs/mobility; see handout for details, Frequent repositioning to prevent skin breakdown, LE elevation for management of edema, Maxi Move for bed to chair transfers, and Assist x2    In place during session: Saunders Catheter and PEG Tube  Chart, physical therapy assessment, plan of care and goals were reviewed.  ASSESSMENT  Patient continues with skilled PT services and is slowly progressing towards goals. Pt in bed upon PT arrival, agreeable to session. Pt A&O x 3. (See below for objective details and assist levels).     Overall pt tolerated session fair today, with some sleepiness noted but motivated for mobility. Patient demos improvement with LLE movement and still PROM for continued reps of therex but able to move L knee and ankle more with mobility. Patient req tactile/verbal cueing for LLE. Patient occasionally nodding off to sleep during mobility. Pt was then mod A x1 to roll left, max a sidelying to sitting EOB with constant support. Patient leaning back to the left with fatigue and discomfort but balance improved when pt sitting all the way up and using RUE support on bed rail. Patient sat up approx 5 min total. RIVERO present while pt at EOB and assisted with returning to bed. Pt completed rolling R and L but max a x2 for rolling to the right side due to inability to use L side to assist. Pt completed rolling several times for bed pan placement and removal.  Will continue to benefit from skilled PT services, and will continue to progress as 
  Problem: Discharge Planning  Goal: Discharge to home or other facility with appropriate resources  5/26/2024 0910 by Christiane Willson RN  Outcome: Progressing  5/25/2024 2059 by Rhonda Henao, RN  Outcome: Progressing  Flowsheets (Taken 5/25/2024 1940)  Discharge to home or other facility with appropriate resources: Identify barriers to discharge with patient and caregiver     Problem: Pain  Goal: Verbalizes/displays adequate comfort level or baseline comfort level  5/26/2024 0910 by Christiane Willson RN  Outcome: Progressing  5/25/2024 2059 by Rhonda Henao, RN  Outcome: Progressing     Problem: Skin/Tissue Integrity  Goal: Absence of new skin breakdown  Description: 1.  Monitor for areas of redness and/or skin breakdown  2.  Assess vascular access sites hourly  3.  Every 4-6 hours minimum:  Change oxygen saturation probe site  4.  Every 4-6 hours:  If on nasal continuous positive airway pressure, respiratory therapy assess nares and determine need for appliance change or resting period.  5/26/2024 0910 by Christiane Willson RN  Outcome: Progressing  5/25/2024 2059 by Rhonda Henao, RN  Outcome: Progressing     Problem: Safety - Adult  Goal: Free from fall injury  5/25/2024 2059 by Rhonda Henao, RN  Outcome: Progressing     
  Problem: Discharge Planning  Goal: Discharge to home or other facility with appropriate resources  5/31/2024 0050 by Fahrat Peck RN  Outcome: Progressing     Problem: Skin/Tissue Integrity  Goal: Absence of new skin breakdown  Description: 1.  Monitor for areas of redness and/or skin breakdown  2.  Assess vascular access sites hourly  3.  Every 4-6 hours minimum:  Change oxygen saturation probe site  4.  Every 4-6 hours:  If on nasal continuous positive airway pressure, respiratory therapy assess nares and determine need for appliance change or resting period.  5/31/2024 1056 by Mel Jara RN  Outcome: Progressing  5/31/2024 0050 by Farhat Peck RN  Outcome: Progressing     Problem: Pain  Goal: Verbalizes/displays adequate comfort level or baseline comfort level  5/31/2024 0050 by Farhat Peck RN  Outcome: Progressing     Problem: Safety - Adult  Goal: Free from fall injury  5/31/2024 1056 by Mel Jara RN  Outcome: Progressing  Flowsheets (Taken 5/31/2024 1056)  Free From Fall Injury:   Instruct family/caregiver on patient safety   Based on caregiver fall risk screen, instruct family/caregiver to ask for assistance with transferring infant if caregiver noted to have fall risk factors  5/31/2024 0050 by Farhat Peck RN  Outcome: Progressing     
  Problem: Discharge Planning  Goal: Discharge to home or other facility with appropriate resources  6/2/2024 0151 by Sarah Lutz RN  Outcome: Progressing     Problem: Pain  Goal: Verbalizes/displays adequate comfort level or baseline comfort level  6/2/2024 0151 by Sarah Lutz RN  Outcome: Progressing     Problem: Skin/Tissue Integrity  Goal: Absence of new skin breakdown  Description: 1.  Monitor for areas of redness and/or skin breakdown  2.  Assess vascular access sites hourly  3.  Every 4-6 hours minimum:  Change oxygen saturation probe site  4.  Every 4-6 hours:  If on nasal continuous positive airway pressure, respiratory therapy assess nares and determine need for appliance change or resting period.  6/2/2024 1545 by Mel Jara RN  Outcome: Progressing  6/2/2024 0151 by Sarah Lutz RN  Outcome: Progressing     Problem: Safety - Adult  Goal: Free from fall injury  6/2/2024 1545 by Mel Jara RN  Outcome: Progressing  Flowsheets (Taken 6/2/2024 1545)  Free From Fall Injury:   Instruct family/caregiver on patient safety   Based on caregiver fall risk screen, instruct family/caregiver to ask for assistance with transferring infant if caregiver noted to have fall risk factors  6/2/2024 0151 by Sarah Lutz RN  Outcome: Progressing     
  Problem: Discharge Planning  Goal: Discharge to home or other facility with appropriate resources  6/5/2024 0136 by July Zamora RN  Outcome: Progressing     Problem: Pain  Goal: Verbalizes/displays adequate comfort level or baseline comfort level  6/5/2024 1122 by Luzma Thorpe RN  Outcome: Progressing  6/5/2024 0136 by July Zamora RN  Outcome: Progressing     Problem: Skin/Tissue Integrity  Goal: Absence of new skin breakdown  Description: 1.  Monitor for areas of redness and/or skin breakdown  2.  Assess vascular access sites hourly  3.  Every 4-6 hours minimum:  Change oxygen saturation probe site  4.  Every 4-6 hours:  If on nasal continuous positive airway pressure, respiratory therapy assess nares and determine need for appliance change or resting period.  6/5/2024 1122 by Luzma Thorpe RN  Outcome: Progressing  6/5/2024 0136 by July Zamora RN  Outcome: Progressing     Problem: Safety - Adult  Goal: Free from fall injury  6/5/2024 1122 by Luzma Thorpe RN  Outcome: Progressing  6/5/2024 0136 by July Zamora RN  Outcome: Progressing     Problem: Physical Therapy - Adult  Goal: By Discharge: Performs mobility at highest level of function for planned discharge setting.  See evaluation for individualized goals.  Description: FUNCTIONAL STATUS PRIOR TO ADMISSION: The patient  required maximum assistance for basic and instrumental ADLs. and The patient was functional at the wheelchair level and required total assistance or used a mechanical device for transfers to the chair.    HOME SUPPORT PRIOR TO ADMISSION: The patient lived with wife, Zhane, and required total assistance or used a mechanical device for transfers to manual wheelchair.    Physical Therapy Goals  Initiated 5/26/2024  Pt stated goal: I want to get up to use the bathroom.  Pt will be I with LE HEP in 7 days.  Pt will perform bed mobility with Maximal Assist and Assist x1 in 7 days.  Pt will perform transfers with Maximal 
  Problem: Discharge Planning  Goal: Discharge to home or other facility with appropriate resources  Outcome: Progressing     Problem: Pain  Goal: Verbalizes/displays adequate comfort level or baseline comfort level  5/27/2024 2012 by Reshma Grimm RN  Outcome: Progressing  Flowsheets (Taken 5/27/2024 1932)  Verbalizes/displays adequate comfort level or baseline comfort level:   Encourage patient to monitor pain and request assistance   Assess pain using appropriate pain scale   Administer analgesics based on type and severity of pain and evaluate response  5/27/2024 0802 by Criselda Mueller LPN  Outcome: Progressing     Problem: Skin/Tissue Integrity  Goal: Absence of new skin breakdown  Description: 1.  Monitor for areas of redness and/or skin breakdown  2.  Assess vascular access sites hourly  3.  Every 4-6 hours minimum:  Change oxygen saturation probe site  4.  Every 4-6 hours:  If on nasal continuous positive airway pressure, respiratory therapy assess nares and determine need for appliance change or resting period.  5/27/2024 2012 by Reshma Grimm RN  Outcome: Progressing  5/27/2024 0802 by Criselda Mueller LPN  Outcome: Progressing     Problem: Safety - Adult  Goal: Free from fall injury  5/27/2024 2012 by Reshma Grimm RN  Outcome: Progressing  5/27/2024 0802 by Criselda Mueller LPN  Outcome: Progressing     Problem: Physical Therapy - Adult  Goal: By Discharge: Performs mobility at highest level of function for planned discharge setting.  See evaluation for individualized goals.  Description: FUNCTIONAL STATUS PRIOR TO ADMISSION: The patient  required maximum assistance for basic and instrumental ADLs. and The patient was functional at the wheelchair level and required total assistance or used a mechanical device for transfers to the chair.    HOME SUPPORT PRIOR TO ADMISSION: The patient lived with wife, Zhane, and required total assistance or used a mechanical device for transfers to manual 
  Problem: Discharge Planning  Goal: Discharge to home or other facility with appropriate resources  Outcome: Progressing     Problem: Pain  Goal: Verbalizes/displays adequate comfort level or baseline comfort level  5/29/2024 2015 by July Zamora RN  Outcome: Progressing  5/29/2024 0755 by Criselda Mueller LPN  Outcome: Progressing     Problem: Skin/Tissue Integrity  Goal: Absence of new skin breakdown  Description: 1.  Monitor for areas of redness and/or skin breakdown  2.  Assess vascular access sites hourly  3.  Every 4-6 hours minimum:  Change oxygen saturation probe site  4.  Every 4-6 hours:  If on nasal continuous positive airway pressure, respiratory therapy assess nares and determine need for appliance change or resting period.  5/29/2024 2015 by July Zamora RN  Outcome: Progressing  5/29/2024 0755 by Criselda Mueller LPN  Outcome: Progressing     Problem: Safety - Adult  Goal: Free from fall injury  5/29/2024 2015 by July Zamora RN  Outcome: Progressing  5/29/2024 0755 by Criselda Mueller LPN  Outcome: Progressing     Problem: Nutrition Deficit:  Goal: Optimize nutritional status  5/29/2024 1235 by Cherelle Torre, VANIA  Outcome: Progressing  Flowsheets (Taken 5/29/2024 1235)  Nutrient intake appropriate for improving, restoring, or maintaining nutritional needs:   Assess nutritional status and recommend course of action   Monitor oral intake, labs, and treatment plans   Recommend, monitor, and adjust tube feedings and TPN/PPN based on assessed needs     
  Problem: Discharge Planning  Goal: Discharge to home or other facility with appropriate resources  Outcome: Progressing     Problem: Pain  Goal: Verbalizes/displays adequate comfort level or baseline comfort level  6/2/2024 0151 by Sarah Lutz, RN  Outcome: Progressing  6/1/2024 1517 by Mel Jara, RN  Outcome: Progressing  Flowsheets (Taken 6/1/2024 1517)  Verbalizes/displays adequate comfort level or baseline comfort level:   Encourage patient to monitor pain and request assistance   Assess pain using appropriate pain scale   Administer analgesics based on type and severity of pain and evaluate response   Implement non-pharmacological measures as appropriate and evaluate response   Consider cultural and social influences on pain and pain management     Problem: Skin/Tissue Integrity  Goal: Absence of new skin breakdown  Description: 1.  Monitor for areas of redness and/or skin breakdown  2.  Assess vascular access sites hourly  3.  Every 4-6 hours minimum:  Change oxygen saturation probe site  4.  Every 4-6 hours:  If on nasal continuous positive airway pressure, respiratory therapy assess nares and determine need for appliance change or resting period.  Outcome: Progressing     Problem: Safety - Adult  Goal: Free from fall injury  Outcome: Progressing     Problem: Chronic Conditions and Co-morbidities  Goal: Patient's chronic conditions and co-morbidity symptoms are monitored and maintained or improved  Outcome: Progressing     Problem: Nutrition Deficit:  Goal: Optimize nutritional status  Outcome: Progressing     
  Problem: Discharge Planning  Goal: Discharge to home or other facility with appropriate resources  Outcome: Progressing     Problem: Pain  Goal: Verbalizes/displays adequate comfort level or baseline comfort level  6/4/2024 0007 by Sarah Lutz RN  Outcome: Progressing  6/3/2024 1708 by Luzma Thorpe RN  Outcome: Progressing     Problem: Skin/Tissue Integrity  Goal: Absence of new skin breakdown  Description: 1.  Monitor for areas of redness and/or skin breakdown  2.  Assess vascular access sites hourly  3.  Every 4-6 hours minimum:  Change oxygen saturation probe site  4.  Every 4-6 hours:  If on nasal continuous positive airway pressure, respiratory therapy assess nares and determine need for appliance change or resting period.  6/4/2024 0007 by Sarah Lutz RN  Outcome: Progressing  6/3/2024 1708 by Luzma Thorpe RN  Outcome: Progressing     Problem: Safety - Adult  Goal: Free from fall injury  6/4/2024 0007 by Sarah Lutz RN  Outcome: Progressing  6/3/2024 1708 by Luzma Thorpe RN  Outcome: Progressing     Problem: Physical Therapy - Adult  Goal: By Discharge: Performs mobility at highest level of function for planned discharge setting.  See evaluation for individualized goals.  Description: FUNCTIONAL STATUS PRIOR TO ADMISSION: The patient  required maximum assistance for basic and instrumental ADLs. and The patient was functional at the wheelchair level and required total assistance or used a mechanical device for transfers to the chair.    HOME SUPPORT PRIOR TO ADMISSION: The patient lived with wife, Zhane, and required total assistance or used a mechanical device for transfers to manual wheelchair.    Physical Therapy Goals  Initiated 5/26/2024  Pt stated goal: I want to get up to use the bathroom.  Pt will be I with LE HEP in 7 days.  Pt will perform bed mobility with Maximal Assist and Assist x1 in 7 days.  Pt will perform transfers with 
  Problem: Discharge Planning  Goal: Discharge to home or other facility with appropriate resources  Outcome: Progressing     Problem: Pain  Goal: Verbalizes/displays adequate comfort level or baseline comfort level  6/5/2024 0136 by July Zamora RN  Outcome: Progressing  6/4/2024 1453 by Luzma Thorpe RN  Outcome: Progressing     Problem: Skin/Tissue Integrity  Goal: Absence of new skin breakdown  Description: 1.  Monitor for areas of redness and/or skin breakdown  2.  Assess vascular access sites hourly  3.  Every 4-6 hours minimum:  Change oxygen saturation probe site  4.  Every 4-6 hours:  If on nasal continuous positive airway pressure, respiratory therapy assess nares and determine need for appliance change or resting period.  6/5/2024 0136 by July Zamora, RN  Outcome: Progressing  6/4/2024 1453 by Luzma Thorpe RN  Outcome: Progressing     Problem: Safety - Adult  Goal: Free from fall injury  6/5/2024 0136 by July Zamora, RN  Outcome: Progressing  6/4/2024 1453 by Luzma Thorpe RN  Outcome: Progressing     
  Problem: Discharge Planning  Goal: Discharge to home or other facility with appropriate resources  Outcome: Progressing     Problem: Pain  Goal: Verbalizes/displays adequate comfort level or baseline comfort level  Outcome: Progressing     Problem: Skin/Tissue Integrity  Goal: Absence of new skin breakdown  Description: 1.  Monitor for areas of redness and/or skin breakdown  2.  Assess vascular access sites hourly  3.  Every 4-6 hours minimum:  Change oxygen saturation probe site  4.  Every 4-6 hours:  If on nasal continuous positive airway pressure, respiratory therapy assess nares and determine need for appliance change or resting period.  6/3/2024 0252 by Sarah Lutz, RN  Outcome: Progressing  6/2/2024 1545 by Mel Jara RN  Outcome: Progressing     Problem: Safety - Adult  Goal: Free from fall injury  6/3/2024 0252 by Sarah Lutz, RN  Outcome: Progressing  6/2/2024 1545 by Mel Jara RN  Outcome: Progressing  Flowsheets (Taken 6/2/2024 1545)  Free From Fall Injury:   Instruct family/caregiver on patient safety   Based on caregiver fall risk screen, instruct family/caregiver to ask for assistance with transferring infant if caregiver noted to have fall risk factors     Problem: Chronic Conditions and Co-morbidities  Goal: Patient's chronic conditions and co-morbidity symptoms are monitored and maintained or improved  Outcome: Progressing     Problem: Nutrition Deficit:  Goal: Optimize nutritional status  Outcome: Progressing     
  Problem: Discharge Planning  Goal: Discharge to home or other facility with appropriate resources  Outcome: Progressing     Problem: Pain  Goal: Verbalizes/displays adequate comfort level or baseline comfort level  Outcome: Progressing     Problem: Skin/Tissue Integrity  Goal: Absence of new skin breakdown  Description: 1.  Monitor for areas of redness and/or skin breakdown  2.  Assess vascular access sites hourly  3.  Every 4-6 hours minimum:  Change oxygen saturation probe site  4.  Every 4-6 hours:  If on nasal continuous positive airway pressure, respiratory therapy assess nares and determine need for appliance change or resting period.  Outcome: Progressing     Problem: Safety - Adult  Goal: Free from fall injury  Outcome: Progressing     Problem: Chronic Conditions and Co-morbidities  Goal: Patient's chronic conditions and co-morbidity symptoms are monitored and maintained or improved  Outcome: Progressing     Problem: Nutrition Deficit:  Goal: Optimize nutritional status  Outcome: Progressing     
  Problem: Discharge Planning  Goal: Discharge to home or other facility with appropriate resources  Outcome: Progressing     Problem: Pain  Goal: Verbalizes/displays adequate comfort level or baseline comfort level  Outcome: Progressing     Problem: Skin/Tissue Integrity  Goal: Absence of new skin breakdown  Description: 1.  Monitor for areas of redness and/or skin breakdown  2.  Assess vascular access sites hourly  3.  Every 4-6 hours minimum:  Change oxygen saturation probe site  4.  Every 4-6 hours:  If on nasal continuous positive airway pressure, respiratory therapy assess nares and determine need for appliance change or resting period.  Outcome: Progressing     Problem: Safety - Adult  Goal: Free from fall injury  Outcome: Progressing     Problem: Physical Therapy - Adult  Goal: By Discharge: Performs mobility at highest level of function for planned discharge setting.  See evaluation for individualized goals.  Description: FUNCTIONAL STATUS PRIOR TO ADMISSION: The patient  required maximum assistance for basic and instrumental ADLs. and The patient was functional at the wheelchair level and required total assistance or used a mechanical device for transfers to the chair.    HOME SUPPORT PRIOR TO ADMISSION: The patient lived with wife, Zhane, and required total assistance or used a mechanical device for transfers to manual wheelchair.    Physical Therapy Goals  Initiated 5/26/2024  Pt stated goal: I want to get up to use the bathroom.  Pt will be I with LE HEP in 7 days.  Pt will perform bed mobility with Maximal Assist and Assist x1 in 7 days.  Pt will perform transfers with Maximal Assist and Assist x1 in 7 days.   Pt will demonstrate improvement in static sitting balance from poor to fair in 7 days.   More PT goals will be added as patient progressed with physical therapy.  5/28/2024 1659 by Chasity Davis PTA  Outcome: Progressing     Problem: Chronic Conditions and Co-morbidities  Goal: Patient's 
  Problem: Nutrition Deficit:  Goal: Optimize nutritional status  Outcome: Progressing  Flowsheets (Taken 5/29/2024 1235)  Nutrient intake appropriate for improving, restoring, or maintaining nutritional needs:   Assess nutritional status and recommend course of action   Monitor oral intake, labs, and treatment plans   Recommend, monitor, and adjust tube feedings and TPN/PPN based on assessed needs     
  Problem: Nutrition Deficit:  Goal: Optimize nutritional status  Outcome: Progressing  Flowsheets (Taken 6/5/2024 1322)  Nutrient intake appropriate for improving, restoring, or maintaining nutritional needs:   Assess nutritional status and recommend course of action   Monitor oral intake, labs, and treatment plans   Recommend, monitor, and adjust tube feedings and TPN/PPN based on assessed needs     
  Problem: Pain  Goal: Verbalizes/displays adequate comfort level or baseline comfort level  5/27/2024 0802 by Criselda Mueller LPN  Outcome: Progressing  5/26/2024 2353 by Rhonda Henao, RN  Outcome: Progressing     Problem: Safety - Adult  Goal: Free from fall injury  5/27/2024 0802 by Criselda Mueller LPN  Outcome: Progressing  5/26/2024 2353 by Rhonda Henao, RN  Outcome: Progressing     Problem: Skin/Tissue Integrity  Goal: Absence of new skin breakdown  Description: 1.  Monitor for areas of redness and/or skin breakdown  2.  Assess vascular access sites hourly  3.  Every 4-6 hours minimum:  Change oxygen saturation probe site  4.  Every 4-6 hours:  If on nasal continuous positive airway pressure, respiratory therapy assess nares and determine need for appliance change or resting period.  5/27/2024 0802 by Criselda Mueller LPN  Outcome: Progressing  5/26/2024 2353 by Rhonda Henao, RN  Outcome: Progressing     
  Problem: Pain  Goal: Verbalizes/displays adequate comfort level or baseline comfort level  5/28/2024 0800 by Criselda Mueller LPN  Outcome: Progressing  5/27/2024 2012 by Reshma Grimm RN  Outcome: Progressing  Flowsheets (Taken 5/27/2024 1932)  Verbalizes/displays adequate comfort level or baseline comfort level:   Encourage patient to monitor pain and request assistance   Assess pain using appropriate pain scale   Administer analgesics based on type and severity of pain and evaluate response     Problem: Safety - Adult  Goal: Free from fall injury  5/28/2024 0800 by Criselda Mueller LPN  Outcome: Progressing  5/27/2024 2012 by Reshma Grimm, RN  Outcome: Progressing     
  Problem: Pain  Goal: Verbalizes/displays adequate comfort level or baseline comfort level  6/1/2024 1517 by Mel Jara, RN  Outcome: Progressing  Flowsheets (Taken 6/1/2024 1517)  Verbalizes/displays adequate comfort level or baseline comfort level:   Encourage patient to monitor pain and request assistance   Assess pain using appropriate pain scale   Administer analgesics based on type and severity of pain and evaluate response   Implement non-pharmacological measures as appropriate and evaluate response   Consider cultural and social influences on pain and pain management  6/1/2024 0506 by Cheryl Shi, RN  Outcome: Progressing     
  Problem: Pain  Goal: Verbalizes/displays adequate comfort level or baseline comfort level  6/6/2024 0811 by Criselda Mueller LPN  Outcome: Progressing  6/5/2024 2022 by July Zamora RN  Outcome: Progressing     Problem: Safety - Adult  Goal: Free from fall injury  6/6/2024 0811 by Criselda Mueller LPN  Outcome: Progressing  6/5/2024 2022 by July Zamora, RN  Outcome: Progressing     
  Problem: Pain  Goal: Verbalizes/displays adequate comfort level or baseline comfort level  Outcome: Progressing     Problem: Skin/Tissue Integrity  Goal: Absence of new skin breakdown  Description: 1.  Monitor for areas of redness and/or skin breakdown  2.  Assess vascular access sites hourly  3.  Every 4-6 hours minimum:  Change oxygen saturation probe site  4.  Every 4-6 hours:  If on nasal continuous positive airway pressure, respiratory therapy assess nares and determine need for appliance change or resting period.  Outcome: Progressing     Problem: Safety - Adult  Goal: Free from fall injury  Outcome: Progressing     
  Problem: Pain  Goal: Verbalizes/displays adequate comfort level or baseline comfort level  Outcome: Progressing     Problem: Skin/Tissue Integrity  Goal: Absence of new skin breakdown  Description: 1.  Monitor for areas of redness and/or skin breakdown  2.  Assess vascular access sites hourly  3.  Every 4-6 hours minimum:  Change oxygen saturation probe site  4.  Every 4-6 hours:  If on nasal continuous positive airway pressure, respiratory therapy assess nares and determine need for appliance change or resting period.  Outcome: Progressing     Problem: Safety - Adult  Goal: Free from fall injury  Outcome: Progressing     Problem: Discharge Planning  Goal: Discharge to home or other facility with appropriate resources  Outcome: Progressing     
  Problem: Pain  Goal: Verbalizes/displays adequate comfort level or baseline comfort level  Outcome: Progressing     Problem: Skin/Tissue Integrity  Goal: Absence of new skin breakdown  Description: 1.  Monitor for areas of redness and/or skin breakdown  2.  Assess vascular access sites hourly  3.  Every 4-6 hours minimum:  Change oxygen saturation probe site  4.  Every 4-6 hours:  If on nasal continuous positive airway pressure, respiratory therapy assess nares and determine need for appliance change or resting period.  Outcome: Progressing     Problem: Safety - Adult  Goal: Free from fall injury  Outcome: Progressing     Problem: Physical Therapy - Adult  Goal: By Discharge: Performs mobility at highest level of function for planned discharge setting.  See evaluation for individualized goals.  Description: FUNCTIONAL STATUS PRIOR TO ADMISSION: The patient  required maximum assistance for basic and instrumental ADLs. and The patient was functional at the wheelchair level and required total assistance or used a mechanical device for transfers to the chair.    HOME SUPPORT PRIOR TO ADMISSION: The patient lived with wife, Zhane, and required total assistance or used a mechanical device for transfers to manual wheelchair.    Physical Therapy Goals  Initiated 5/26/2024  Pt stated goal: I want to get up to use the bathroom.  Pt will be I with LE HEP in 7 days.  Pt will perform bed mobility with Maximal Assist and Assist x1 in 7 days.  Pt will perform transfers with Maximal Assist and Assist x1 in 7 days.   Pt will demonstrate improvement in static sitting balance from poor to fair in 7 days.   More PT goals will be added as patient progressed with physical therapy.  6/3/2024 1517 by Mary Dumont, PT  Outcome: Progressing     Problem: Occupational Therapy - Adult  Goal: By Discharge: Performs self-care activities at highest level of function for planned discharge setting.  See evaluation for individualized 
  Problem: Skin/Tissue Integrity  Goal: Absence of new skin breakdown  Description: 1.  Monitor for areas of redness and/or skin breakdown  2.  Assess vascular access sites hourly  3.  Every 4-6 hours minimum:  Change oxygen saturation probe site  4.  Every 4-6 hours:  If on nasal continuous positive airway pressure, respiratory therapy assess nares and determine need for appliance change or resting period.  5/29/2024 0755 by Criselda Mueller LPN  Outcome: Progressing  5/28/2024 2212 by July Zamora, RN  Outcome: Progressing     Problem: Safety - Adult  Goal: Free from fall injury  5/29/2024 0755 by Criselda Mueller LPN  Outcome: Progressing  5/28/2024 2212 by July Zamora, RN  Outcome: Progressing     
  Problem: Skin/Tissue Integrity  Goal: Absence of new skin breakdown  Description: 1.  Monitor for areas of redness and/or skin breakdown  2.  Assess vascular access sites hourly  3.  Every 4-6 hours minimum:  Change oxygen saturation probe site  4.  Every 4-6 hours:  If on nasal continuous positive airway pressure, respiratory therapy assess nares and determine need for appliance change or resting period.  Outcome: Progressing     Problem: Safety - Adult  Goal: Free from fall injury  Outcome: Progressing     
  Problem: Skin/Tissue Integrity  Goal: Absence of new skin breakdown  Description: 1.  Monitor for areas of redness and/or skin breakdown  2.  Assess vascular access sites hourly  3.  Every 4-6 hours minimum:  Change oxygen saturation probe site  4.  Every 4-6 hours:  If on nasal continuous positive airway pressure, respiratory therapy assess nares and determine need for appliance change or resting period.  Outcome: Progressing     Problem: Safety - Adult  Goal: Free from fall injury  Outcome: Progressing     
  Problem: Skin/Tissue Integrity  Goal: Absence of new skin breakdown  Description: 1.  Monitor for areas of redness and/or skin breakdown  2.  Assess vascular access sites hourly  3.  Every 4-6 hours minimum:  Change oxygen saturation probe site  4.  Every 4-6 hours:  If on nasal continuous positive airway pressure, respiratory therapy assess nares and determine need for appliance change or resting period.  Outcome: Progressing     Problem: Safety - Adult  Goal: Free from fall injury  Outcome: Progressing     Problem: Pain  Goal: Verbalizes/displays adequate comfort level or baseline comfort level  Outcome: Progressing     
OCCUPATIONAL THERAPY TREATMENT  Patient: Florentino Dalal (68 y.o. male)  Date: 6/5/2024  Primary Diagnosis: UTI (urinary tract infection) [N39.0]  Metabolic acidosis [E87.20]  SIRS (systemic inflammatory response syndrome) (HCC) [R65.10]  Acute cystitis without hematuria [N30.00]       Precautions: Modified Diet, Skin, Fall Risk, General Precautions, Bed Alarm                Recommendations for nursing mobility: Encourage HEP in prep for ADLs/mobility; see handout for details, Frequent repositioning to prevent skin breakdown, Use of bed/chair alarm for safety, and Assist x2    In place during session: Saunders Catheter and PEG Tube  Chart, occupational therapy assessment, plan of care, and goals were reviewed.  ASSESSMENT  Patient continues with skilled OT services and is slowly progressing towards goals. Pt received sitting EOB with PTA upon RIVERO arrival, agreeable to session. Pt A&O x 3. Pt demonstrated improvement with sitting tolerance EOB. Pt max A x2 to return supine and required A to roll L<> for bedpan placement.  Pt unable to have a BM.  Pt attempted to reach back with R UE for posterior hygiene but was unable to complete without losing side lying balance.  Pt returned supine and assisted with pulling himself up in bed (continues to require max A x2).  Pt instructed in arianna technique dressing to [facility] gown.  Pt demonstrated understanding with increase time, demonstration and max A.  Pt was left semi supine with all needs met.  Pt's spouse in room during session with pt's approval. (See below for objective details and assist levels).     Overall pt tolerated session fair today with increase activity tolerance. Potential barriers for safe discharge: pts current support system is unable to meet their requirements for physical assistance, pt has poor safety awareness, pt has impaired cognition, pt is a high fall risk, pt is not safe to be alone, and concern for pt safely navigating or managing the 
PHYSICAL THERAPY EVALUATION  Patient: Florentino Dalal (68 y.o. male)  Date: 5/26/2024  Primary Diagnosis: UTI (urinary tract infection) [N39.0]  Metabolic acidosis [E87.20]  SIRS (systemic inflammatory response syndrome) (HCC) [R65.10]  Acute cystitis without hematuria [N30.00]       Precautions: Modified Diet, Skin, Fall Risk, General Precautions, Bed Alarm                      Recommendations for nursing mobility: Encourage HEP in prep for ADLs/mobility; see handout for details, Frequent repositioning to prevent skin breakdown, Use of bed/chair alarm for safety, Maxi Move for bed to chair transfers, and Assist x2    In place during session: Peripheral IV, External Catheter, and EKG/telemetry     ASSESSMENT  Pt is a 68 y.o. 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 Pt supine in bed upon PT arrival, agreeable to evaluation. Pt A&O x 1 to self only.  Patient's wife, Zhane, present and actively participated in history and mobility during evaluation.    Based on the objective data described below, the patient currently presents with impaired functional mobility, decreased independence in ADLs, impaired ability to perform high-level IADLs, decreased ROM, impaired strength, poor body mechanics, decreased activity tolerance, poor safety awareness, impaired cognition, decreased command following, poor attention/concentration, decreased coordination, impaired balance, decreased proprioception, decreased fine-motor control, and increased pain levels. (See below for objective details and assist levels).     Overall pt tolerated session fairly today with c/o back pain that increased during bed mobility. Pt was also lethargic, but responded well to verbal cues to maintain attendance. Pt required Max A x 1 for rolling to the left needing verbal cues to sequence R UE on 
will demonstrate improvement in static sitting balance from poor to fair in 7 days.   More PT goals will be added as patient progressed with physical therapy.  6/5/2024 1051 by Nicolle Smith PTA  Outcome: Progressing     Problem: Chronic Conditions and Co-morbidities  Goal: Patient's chronic conditions and co-morbidity symptoms are monitored and maintained or improved  Outcome: Progressing     Problem: Occupational Therapy - Adult  Goal: By Discharge: Performs self-care activities at highest level of function for planned discharge setting.  See evaluation for individualized goals.  Description: FUNCTIONAL STATUS PRIOR TO ADMISSION:  Pt requires assist with all ADLs from wife; ray lift/hospital bed; has HHOT/PT.     HOME SUPPORT: Pt lives at home with wife.    Occupational Therapy Goals:  Initiated 5/28/2024  Patient/Family stated goal: I want to be able to move without hurting.  1.  Patient will perform grooming seated EOB with Set-up within 7 day(s).  2.  Patient will perform upper body dressing with Moderate Assist within 7 day(s).  3.  Patient will perform lower body dressing with Maximal Assist within 7 day(s).  4.  Patient will perform toilet transfers with Maximal Assist and Assist x2  within 7 day(s).  5.  Patient will perform all aspects of toileting with Maximal Assist and Assist x2 within 7 day(s).  6.  Patient will participate in upper extremity therapeutic exercise/activities with Minimal Assist within 7 day(s).    7.  Patient will utilize energy conservation techniques during functional activities with verbal cues within 7 day(s).  6/5/2024 1104 by Lucila Bahena OTA  Outcome: Progressing     Problem: Nutrition Deficit:  Goal: Optimize nutritional status  6/5/2024 2022 by July Zamora, RN  Outcome: Progressing  6/5/2024 1329 by Cherelle Torre RD  Outcome: Progressing  Flowsheets (Taken 6/5/2024 1322)  Nutrient intake appropriate for improving, restoring, or maintaining nutritional needs:   
to redirect  Memory: Impaired  Safety Judgement: Impaired  Problem Solving: Assistance required to identify errors made;Assistance required to generate solutions;Assistance required to implement solutions;Assistance required to correct errors made;Decreased awareness of errors  Insights: Decreased awareness of deficits  Initiation: Requires cues for some  Sequencing: Requires cues for some    Functional Mobility Training:  Bed Mobility:  Bed Mobility Training  Bed Mobility Training: Yes  Overall Level of Assistance: Moderate assistance;Assist X1;Assist X2;Additional time  Rolling: Maximum assistance;Moderate assistance;Assist X1  Supine to Sit: Additional time;Moderate assistance;Assist X1;Maximum assistance  Sit to Supine: Maximum assistance;Assist X2  Scooting: Maximum assistance;Assist X2;Additional time  Transfers:     Balance:  Balance  Sitting: Impaired  Sitting - Static: Poor (constant support);Fair (occasional)  Sitting - Dynamic: Poor (constant support)  Wheelchair Mobility:     Ambulation/Gait Training:                                                      Therapeutic Exercises:       EXERCISE   Sets   Reps   Active Active Assist   Passive Self ROM   Comments   Ankle Pumps   [] [] [] []    Quad Sets/Glut Sets   [] [] [] []    Hamstring Sets   [] [] [] []    Short Arc Quads   [] [] [] []    Heel Slides   [] [] [] []    Straight Leg Raises   [] [] [] []    Hip abd/add   [] [] [] []    Long Arc Quads   [] [] [] []    Marching   [] [] [] []    Seated HR/TR   [] [] [] []       [] [] [] []       Pain Ratin/10 feet due to neuropathies-wife reported patient is not on any meds for neuropathies.  Pain Intervention(s):       Activity Tolerance:   Fair     After treatment patient left in no apparent distress:   Bed locked and returned to lowest position, Patient left in no apparent distress in bed, Call bell within reach, Bed/ chair alarm activated, Caregiver / family present, and Heels elevated for pressure 
Family education for ROM exericses/bed mobility, d/c recs  Education Method: Verbal;Demonstration;Teach Back  Barriers to Learning: Hearing  Education Outcome: Verbalized understanding;Continued education needed        Nicolle Smith, CHAVA  Minutes: 48           
and Registered nurse    Patient Education  Education Given To: Patient;Family  Education Provided: Role of Therapy;Plan of Care;Mobility Training  Education Provided Comments: Pt and wife edu on pressure relief positioning pt on side; also edu on importance of skin hygiene after BM to prevent further breakdown.  Education Method: Demonstration;Verbal  Barriers to Learning: Cognition  Education Outcome: Verbalized understanding;Demonstrated understanding    The supervising occupational therapist and treating occupational therapist assistant have met to review this patient’s progress and plan of care.    This patient’s plan of care is appropriate for delegation to JUANITO.     Pt is functioning close to baseline; ray lift and hospital bed at home.     Thank you for this referral,  Toan Toledo, OT  Minutes: 38

## 2024-06-06 NOTE — DISCHARGE SUMMARY
Discharge Summary    Name: Florentino Dalal  [id number]  YOB: 1955 (Age: 68 y.o.)   Date of Admission: 5/24/2024  Date of Discharge: 6/3/2024  Attending Physician: Linus Springer MD    Discharge Diagnosis:     Consultations:  IP CONSULT TO UROLOGY  IP CONSULT TO DIETITIAN  IP CONSULT TO SPIRITUAL SERVICES  IP WOUND CARE NURSE CONSULT TO EVAL  IP CONSULT TO DIETITIAN      Brief Admission History/Reason for Admission Per Jose Frye MD:     5/24 - Admission H&P  68 y.o. 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  Presents to the ED accompanied by wife and son with reports of lower abdominal pain.  No nausea or vomiting.  Had constipation for several days prior to presenting.  He has had limited oral intake secondary to poor appetite.  This resulted after last hemorrhagic stroke.  A PEG tube was placed and he gets PEG feeds at home fortified with protein additives.  Evaluation in the ED revealed heart rate in the 120s, normal BP and urine analysis positive for nitrates and leukocyte esterase.  Will be admitted for IV antibiotics and IV fluids.     6/3  Evaluated at bedside with wife present.  Patient is alert and oriented x 2 and continues to have mild abdominal discomfort however no nao pain.  Has had BM x 3 today.  Continues to have Saunders which he will be discharged with to SNF.    No overnight fever/chills, chest pain, shortness of breath noted.  Continues to have severe weakness pending SNF placement per CM.    ===========================================================  Brief Hospital Course by Main Problems:     Urinary tract infection  Finished a course of antibiotics (Ciprofloxacin then Ceftriaxone)     Urinary retention  Possibly due to BPH and exacerbated by constipation.   Followed by urology.   Recommending to keep indwelling 
                                  Discharge Summary    Name: Florentino Dalal  [id number]  YOB: 1955 (Age: 68 y.o.)   Date of Admission: 5/24/2024  Date of Discharge: 6/6/2024  Attending Physician: Linus Springer MD    Discharge Diagnosis:     Consultations:  IP CONSULT TO UROLOGY  IP CONSULT TO DIETITIAN  IP CONSULT TO SPIRITUAL SERVICES  IP WOUND CARE NURSE CONSULT TO EVAL  IP CONSULT TO DIETITIAN  IP CONSULT TO CASE MANAGEMENT      Brief Admission History/Reason for Admission Per Jose Frye MD:     5/24 - Admission H&P  68 y.o. 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  Presents to the ED accompanied by wife and son with reports of lower abdominal pain.  No nausea or vomiting.  Had constipation for several days prior to presenting.  He has had limited oral intake secondary to poor appetite.  This resulted after last hemorrhagic stroke.  A PEG tube was placed and he gets PEG feeds at home fortified with protein additives.  Evaluation in the ED revealed heart rate in the 120s, normal BP and urine analysis positive for nitrates and leukocyte esterase.  Will be admitted for IV antibiotics and IV fluids.     6/3  Evaluated at bedside with wife present.  Patient is alert and oriented x 2 and continues to have mild abdominal discomfort however no noa pain.  Has had BM x 3 today.  Continues to have Saunders which he will be discharged with to SNF.      6/4  Again evaluated at bedside with wife present.  Patient is alert and oriented x 2 with improved abdominal pain noted.  Overnight BM noted.  Continues to have Saunders which he will be discharged with to SNF.    No overnight fever/chills, chest pain, shortness of breath noted.  Continues to have severe weakness pending SNF placement per CM.      6/5  Again evaluated at bedside with wife present.  Patient is alert and oriented x 
[facility] [place]  [facility] [place] [address]  [phone]    Follow up in 2 month(s)        Total time in minutes spent coordinating this discharge (includes going over instructions, follow-up, prescriptions, and preparing report for sign off to her PCP) :  35 minutes   
packet  Commonly known as: CEPHULAC     latanoprost 0.005 % ophthalmic solution  Commonly known as: XALATAN     lidocaine 5 %  Commonly known as: LIDODERM     LORazepam 1 MG tablet  Commonly known as: ATIVAN     traMADol 50 MG tablet  Commonly known as: ULTRAM               Where to Get Your Medications        These medications were sent to [facility] - [place], [place] - [address] - P [phone] - F [phone] [address], [facility] [address]      Phone: [phone]   finasteride 5 MG tablet  melatonin 3 MG Tabs tablet  metoprolol tartrate 25 MG tablet           DISPOSITION:    Home with Family:       Home with HH/PT/OT/RN:    SNF/LTC: xx   JARON:    OTHER:        Code status:   Recommended diet:  Tube feeds  Recommended activity: activity as tolerated    Follow up with:   PCP : Larry Johnson Jr., MD Phipps, William J Jr., MD  [address]  [facility] [address] [phone]    Follow up in 2 week(s)      Carloz Vasques MD  [address] [place] [id number] [phone]    Follow up in 2 month(s)        Total time in minutes spent coordinating this discharge (includes going over instructions, follow-up, prescriptions, and preparing report for sign off to her PCP) :  35 minutes

## 2024-06-06 NOTE — PROGRESS NOTES
Hospital Medicine Progress Note      Date of Admission: 5/24/2024  Hospital Day: 10    Chief Admission Complaint: Urinary tract infection     Subjective:     Patient seen and examined Resting in bed comfortably.  No acute events overnight.  Wife at bedside.     Assessment/Plan:        Urinary tract infection  Finished a course of antibiotics (Ciprofloxacin then Ceftriaxone)    Urinary retention  Possibly due to BPH and exacerbated by constipation.   Followed by urology.   Recommending to keep indwelling catheter at this     Constipation  Improved after enema     Feeding disorder  S/p PEG tube  Appreciate input from nutrition     Hypokalemia  Corrected with replacement     History of hemorrhagic stroke  Left-sided residual deficit    PT/OT  Plan for rehab placement    Right ICA occlusion with underlying dissection  Continue aspirin  Follows at [facility]     Hypertension  continue lisinopril     Type 2 diabetes mellitus  Good control with Glargine 34 units qhs and SSI       Physical Exam Performed:      General appearance:  No apparent distress  Respiratory:  Normal respiratory effort. CTA  Cardiovascular:  Regular rate and rhythm.  Abdomen:  Soft, non-tender, non-distended. PEG in place   Extremities: No edema  SKIN: No rash  Neurologic:  Non-focal, residual left-sided weakness  Psychiatric:  Alert and oriented    /71   Pulse 71   Temp 97.7 °F (36.5 °C) (Oral)   Resp 20   Ht 1.829 m (6')   Wt 117.9 kg (260 lb)   SpO2 96%   BMI 35.26 kg/m²     Diet: ADULT TUBE FEEDING; PEG; Diabetic; Bolus; 4 Times Daily; 300; 400; Q 6 hours; Protein; 1 Dose; BID  DVT Prophylaxis: [x]PPx LMWH  []SQ Heparin  []IPC/SCDs  []Eliquis  []Xarelto  []Coumadin  []Other -      Code status: DNR  PT/OT Eval Status:   []NOT yet ordered  []Ordered and Pending   [x]Seen with Recommendations for:  []Home independently  []Home w/ assist  []HHC  [x]SNF  []Acute Rehab    Anticipated Discharge Day/Date: 24-48    Anticipated Discharge Location: 
  Hospital Medicine Progress Note      Date of Admission: 5/24/2024  Hospital Day: 4    Chief Admission Complaint: Urinary tract infection     Subjective: Patient seen and examined this morning.  No acute events overnight.  Resting comfortably in bed.        Assessment/Plan:    68-year-old gentleman with multiple comorbidities including CVA with left-sided residual deficit, history of right ICA occlusion with underlying dissection admitted May 24 for constipation. Had 2 small bowel movements overnight. Repeat CTA of the neck and head did not show dissection involving the right internal carotid artery extending from the level of C2 into the skull base and terminates in the petrous segment. Family is aware of this diagnosis and follows up with [facility] vascular surgeon. Family declined any vascular surgical consultation during this admission.       #Urinary tract infection  Cultures 5/24 possible Pseudomonas.  Patient had received ciprofloxacin initially.  Currently on IV Rocephin.  Will finish a course tomorrow.  No signs symptoms of UTI thus we will not switch to Pseudomonas covering antibacterial    #Urinary retention  Followed by urology  Recommending to keep Saunders      #Feeding disorder  S/p PEG tube  Appears dehydrated  Increase free water flushes      #History of hemorrhagic stroke  Left-sided residual deficit  Continue aspirin    #Right ICA occlusion with underlying dissection    #Constipation  S/p enema had multiple bowel movement from last night till today      # Hypertension-continue metoprolol/lisinopril/    # Type 2 diabetes mellitus  Continue basal bolus regimen  Continue monitor blood sugars    Discussed case with wife at bedside.  Medically stable for discharge pending placement    Physical Exam Performed:      General appearance:  No apparent distress  Respiratory:  Normal respiratory effort. CTA  Cardiovascular:  Regular rate and rhythm.  Abdomen:  Soft, non-tender, non-distended.  Extremities: No 
  Hospital Medicine Progress Note      Date of Admission: 5/24/2024  Hospital Day: 5    Chief Admission Complaint: Urinary tract infection     Subjective: Patient seen examined this morning.  No acute events overnight.  Resting in bed comfortably.  Had further conversations with wife at bedside.        Assessment/Plan:    68-year-old gentleman with multiple comorbidities including CVA with left-sided residual deficit, history of right ICA occlusion with underlying dissection admitted May 24 for constipation. Had 2 small bowel movements overnight. Repeat CTA of the neck and head did not show dissection involving the right internal carotid artery extending from the level of C2 into the skull base and terminates in the petrous segment. Family is aware of this diagnosis and follows up with U vascular surgeon. Family declined any vascular surgical consultation during this admission.       #Urinary tract infection  Cultures 5/24 possible Pseudomonas.  Patient had received ciprofloxacin initially.  Currently on IV Rocephin.  Will finish a course tomorrow.  No signs symptoms of UTI thus we will not switch to Pseudomonas covering antibacterial    #Urinary retention  Followed by urology  Recommending to keep Saunders  Wife had concerns as to how to take care of the Saunders once patient gets home.    #Feeding disorder  S/p PEG tube  Appears dehydrated  Free water flush increased to 400 every 6 hours      #History of hemorrhagic stroke  Left-sided residual deficit  Continue aspirin    #Right ICA occlusion with underlying dissection    #Constipation  S/p enema had multiple bowel movement from last night till today      # Hypertension-continue metoprolol/lisinopril/    # Type 2 diabetes mellitus  Continue basal bolus regimen  Continue monitor blood sugars    Patient is medically stable for discharge.  Patient doing insurance authorization for placement.    Physical Exam Performed:      General appearance:  No apparent 
  Hospital Medicine Progress Note      Date of Admission: 5/24/2024  Hospital Day: 7    Chief Admission Complaint: Urinary tract infection     Subjective: Patient seen and examined this morning.  I did speak to wife at bedside.  No acute events overnight.  She did state he usually takes tramadol and pain as been on and off.  Wife complaining of physical therapy not seeing him as well as she did not get a chance to speak to dietitian.  Otherwise patient diarrhea has improved.  He is resting in bed comfortably.        Assessment/Plan:    68-year-old gentleman with multiple comorbidities including CVA with left-sided residual deficit, history of right ICA occlusion with underlying dissection admitted May 24 for constipation. Had 2 small bowel movements overnight. Repeat CTA of the neck and head did not show dissection involving the right internal carotid artery extending from the level of C2 into the skull base and terminates in the petrous segment. Family is aware of this diagnosis and follows up with U vascular surgeon. Family declined any vascular surgical consultation during this admission.       #Hypokalemia-will replete with 40 mEq through the PEG and 40 IV.  Recheck potassium later this afternoon    #Urinary tract infection-finished a course of antibiotics    #Urinary retention  Followed by urology  Recommending to keep Saunders  Wife had concerns as to how to take care of the Saunders once patient gets home.    #Feeding disorder  S/p PEG tube  Free water flush increased to 400 every 6 hours  Will ask nutrition to speak to wife regarding their recommendations.    #History of hemorrhagic stroke  Left-sided residual deficit  Continue aspirin  PT/OT  Plan for rehab placement    #Right ICA occlusion with underlying dissection    #Constipation  Has been having diarrhea since enema was given.  Likely overflow related.  I will hold off of antidiarrheal medication for now    # Hypertension-continue 
  Hospital Medicine Progress Note      Date of Admission: 5/24/2024  Hospital Day: 8    Chief Admission Complaint: Urinary tract infection     Subjective: Patient seen and examined this morning.  Resting in bed comfortably.  No acute events overnight.  Wife at bedside.  Discussed plan of care.  Insurance authorization pending        Assessment/Plan:    68-year-old gentleman with multiple comorbidities including CVA with left-sided residual deficit, history of right ICA occlusion with underlying dissection admitted May 24 for constipation. Had 2 small bowel movements overnight. Repeat CTA of the neck and head did not show dissection involving the right internal carotid artery extending from the level of C2 into the skull base and terminates in the petrous segment. Family is aware of this diagnosis and follows up with U vascular surgeon. Family declined any vascular surgical consultation during this admission.       Pending insurance authorization for discharge    #Hypokalemia-will replete with 40 mEq through the PEG and 40 IV.  Recheck potassium later this afternoon    #Urinary tract infection-finished a course of antibiotics    #Urinary retention  Followed by urology  Recommending to keep Saunders  Wife had concerns as to how to take care of the Saunders once patient gets home.    #Feeding disorder  S/p PEG tube  Free water flush increased to 400 every 6 hours  Will ask nutrition to speak to wife regarding their recommendations.    #History of hemorrhagic stroke  Left-sided residual deficit  Continue aspirin  PT/OT  Plan for rehab placement    #Right ICA occlusion with underlying dissection    #Constipation  Has been having diarrhea since enema was given.  Likely overflow related.  I will hold off of antidiarrheal medication for now    # Hypertension-continue metoprolol/lisinopril    # Type 2 diabetes mellitus  Continue basal bolus regimen  Continue monitor blood sugars        Pending insurance authorization otherwise 
  Physician Progress Note      PATIENT:               AMY HAWK  CSN #:                  [id number]  :                       1955  ADMIT DATE:       2024 8:05 AM  DISCH DATE:  RESPONDING  PROVIDER #:        Jalen Andres MD          QUERY TEXT:    Patient admitted with constipation. Noted documentation of Urinary tract   infection in IM PN -. In order to support the diagnosis of Urinary tract   infection, please include additional clinical indicators in your   documentation.  Or please document if the diagnosis of Urinary tract infection   has been ruled out after further study.    The medical record reflects the following:  Risk Factors: Age, Urinary retention  Clinical Indicators: IM PN -Urinary tract infection-Cultures  possible   Pseudomonas. No signs symptoms of UTI thus we will not switch to Pseudomonas   covering antibacterial, Patient had received ciprofloxacin initially.    Currently on IV Rocephin.  Will finish a course tomorrow.  Treatment: IV Rocephin, Urine Culture        Thank you,  Apryl Rodriguez, [facility] CDS  Options provided:  -- Urinary tract infection present as evidenced by, Please document evidence.  -- Urinary tract infection was ruled out  -- Other - I will add my own diagnosis  -- Disagree - Not applicable / Not valid  -- Disagree - Clinically unable to determine / Unknown  -- Refer to Clinical Documentation Reviewer    PROVIDER RESPONSE TEXT:    Urinary tract infection is present as evidenced by    Query created by: Apryl Rodriguez on 2024 12:53 AM      Electronically signed by:  Jalen Andres MD 2024 12:20 PM          
  Pt 2100  per pt's freestyle reader.        
..4 Eyes Skin Assessment     NAME:  Florentino Dalal  YOB: 1955  MEDICAL RECORD NUMBER:  [id number]    The patient is being assessed for  Weekly skin assessment    I agree that at least one RN has performed a thorough Head to Toe Skin Assessment on the patient. ALL assessment sites listed below have been assessed.      Areas assessed by both nurses: sacral and scrotal redness    Head, Face, Ears, Shoulders, Back, Chest, Arms, Elbows, Hands, Sacrum. Buttock, Coccyx, Ischium, and Legs. Feet and Heels        Does the Patient have a Wound? Yes        [place] Prevention initiated by RN: Yes  Wound Care Orders initiated by RN: Yes    Pressure Injury (Stage 3,4, Unstageable, DTI, NWPT, and Complex wounds) if present, place Wound referral order by RN under : No    New Ostomies, if present place, Ostomy referral order under : No     Nurse 1 eSignature: Electronically signed by PEDRO MCALLISTER LPN on 5/29/24 at 6:52 PM EDT    **SHARE this note so that the co-signing nurse can place an eSignature**    Nurse 2 eSignature: Electronically signed by Julio Bhardwaj RN on 5/29/24 at 6:54 PM EDT   
..Discharge Summary    Date: 6/6/2024  Patient Name: Florentino Dalal    YOB: 1955     Age: 68 y.o.    Admit Date: 5/24/2024  Discharge Date:  Discharge Condition:    Admission Diagnosis  UTI (urinary tract infection) [N39.0];Metabolic acidosis [E87.20];SIRS (systemic inflammatory response syndrome) (HCC) [R65.10];Acute cystitis without hematuria [N30.00]      Discharge Diagnosis  Principal Problem:    UTI (urinary tract infection)  Resolved Problems:    * No resolved hospital problems. *      Hospital Stay  Narrative of Hospital Course:      Consultants:  IP CONSULT TO UROLOGY  IP CONSULT TO DIETITIAN  IP CONSULT TO SPIRITUAL SERVICES  IP WOUND CARE NURSE CONSULT TO EVAL  IP CONSULT TO DIETITIAN  IP CONSULT TO CASE MANAGEMENT    Surgeries/procedures Performed:      Treatments:            Discharge Plan/Disposition:  Home    Hospital/Incidental Findings Requiring Follow Up:    Patient Instructions:    Diet:    Activity:  For number of days (if applicable):      Other Instructions:    Provider Follow-Up:   No follow-ups on file.     Significant Diagnostic Studies:    Recent Labs:  Admission on 05/24/2024  No results displayed because visit has over 200 results.    ------------    Radiology last 7 days:  No results found.     Pending Labs     Order Current Status    Culture, Urine In process        Discharge Medications    Current Discharge Medication List    START taking these medications    ciprofloxacin (CIPRO) 500 MG tablet  Take 1 tablet by mouth 2 times daily for 10 days  Qty: 20 tablet Refills: 0    finasteride (PROSCAR) 5 MG tablet  1 tablet by PEG Tube route daily  Qty: 30 tablet Refills: 3    metoprolol tartrate (LOPRESSOR) 25 MG tablet  Take 1 tablet by mouth 2 times daily  Qty: 60 tablet Refills: 3          Current Discharge Medication List    CONTINUE these medications which have CHANGED    melatonin 3 MG TABS tablet  3 tablets by Per G Tube route nightly as needed (insomnia)  Qty: 90 
1200 - burnett inserted by Dr. Marx due to urinary retention.  Bladder scan showed 1300 in bladder. Unable to straight cath successfully due to patient anatomy.  1600 of cloudy urine drained.    
2000H Pt refused Lovenox shot and wife specifically instructed this nurse not to do accuchecks on his  with the fingerstick since he does have the self monitoring device on his bedside.     0730H Endorsed to incoming nurse about wife's requests.      
4 Eyes Skin Assessment     NAME:  Florentino Dalal  YOB: 1955  MEDICAL RECORD NUMBER:  [id number]    The patient is being assessed for  Admission    I agree that at least one RN has performed a thorough Head to Toe Skin Assessment on the patient. ALL assessment sites listed below have been assessed.      Areas assessed by both nurses:    Head, Face, Ears, Shoulders, Back, Chest, Arms, Elbows, Hands, Sacrum. Buttock, Coccyx, Ischium, Legs. Feet and Heels, and Under Medical Devices         Does the Patient have a Wound? Yes wound(s) were present on assessment. LDA wound assessment was Initiated and completed by RN       Clovis Prevention initiated by RN: Yes  Wound Care Orders initiated by RN: No    Pressure Injury (Stage 3,4, Unstageable, DTI, NWPT, and Complex wounds) if present, place Wound referral order by RN under : No    New Ostomies, if present place, Ostomy referral order under : No     Nurse 1 eSignature: Electronically signed by FORREST SINGH RN on 5/25/24 at 12:07 AM EDT    **SHARE this note so that the co-signing nurse can place an eSignature**    Nurse 2 eSignature: Electronically signed by Ryanne Shaver RN on 5/25/24 at 1:53 AM EDT   
4 Eyes Skin Assessment     NAME:  Florentino Dalal  YOB: 1955  MEDICAL RECORD NUMBER:  [id number]    The patient is being assessed for  Other Weekly Assessmnet    I agree that at least one RN has performed a thorough Head to Toe Skin Assessment on the patient. ALL assessment sites listed below have been assessed.      Areas assessed by both nurses:    Head, Face, Ears, Shoulders, Back, Chest, Arms, Elbows, Hands, Sacrum. Buttock, Coccyx, Ischium, Legs. Feet and Heels, and Under Medical Devices         Does the Patient have a Wound? No noted wound(s)       Clovis Prevention initiated by RN: Yes  Wound Care Orders initiated by RN: No    Pressure Injury (Stage 3,4, Unstageable, DTI, NWPT, and Complex wounds) if present, place Wound referral order by RN under : No    New Ostomies, if present place, Ostomy referral order under : No     Nurse 1 eSignature: Electronically signed by Luzma Thorpe RN on 6/5/24 at 2:57 PM EDT    **SHARE this note so that the co-signing nurse can place an eSignature**    Nurse 2 eSignature: Electronically signed by Julio Bhardwaj RN on 6/5/24 at 4:45 PM EDT   
Blood sugar on his monitor is 85 mg/dl and he is fed DM diet via PEG  
Blood sugar on patient's monitor is reading 185 mg/dl  
Call returned by Dr. Andres and reported patient with complaints of  KCL. Orders that he will discontinue doses, and reorder another route. Rounding on patient at this time and per wife K+ results has improved at this time. Tolerating feeding well, no nausea and vomiting noted. Wife requesting to see therapy today and PT in room working with patient. Wife at bedside and support. She assist with feeding patient via G-Tube and knowledgeable on how to perform TF. Lungs clear, no signs and symptoms of aspirations. Keeping HOB elevated at 45 degrees, for good lung expansion and aspiration prevention after feeds. Sacral area remain red after many loose stools. Applying preventive cream to sacral area, and keeping patient turned and repositioned. Will continue with plan of care. CM in contact with wife today as to discharge plans to rehab soon.   
Comprehensive Nutrition Assessment    Type and Reason for Visit:  Reassess    Nutrition Recommendations/Plan:   Continue to monitor TF tolerance, weights and labs     Malnutrition Assessment:  Malnutrition Status:  No malnutrition (05/25/24 1850)    Context:  Chronic Illness     Findings of the 6 clinical characteristics of malnutrition:  Energy Intake:  No significant decrease in energy intake  Weight Loss:  No significant weight loss     Body Fat Loss:  Unable to assess     Muscle Mass Loss:  Unable to assess    Fluid Accumulation:  No significant fluid accumulation     Strength:  Not Performed    Nutrition Assessment:    Pt admitted w/ abdominal pain +C, pt w/ chronic PEG 2/2 dysphagia. Pt w/ previous poor appetite after CVA that resulted in PEG placement. Pt getting bolus feeds at home with protein supplement. RD to adjust regimen ordered to better meet needs and prevent over feeding. Will evaluate home reg. at f/u. Meds and labs reviewed, will follow per protocol. 05/29/24 Follow up: Continues on Bolus feeding, tolerates, no residuals, H20 flush icreased to 400 ml every 6 hours, TF meets >80% of caloric need. Review meds and labs. No new weight.    Nutrition Related Findings:    NFPE deferred, +PEG 2/2 dysphagia. No N/V, pt w/ abdominal tenderness and hypoactive bowel sounds. LBM 5/25. No edema. Wound Type: None       Current Nutrition Intake & Therapies:    Average Meal Intake: NPO  Average Supplements Intake: NPO  Current Tube Feeding (TF) Orders:  Feeding Route: PEG  Formula: Diabetic  Feeding Regimen: Glucerna 1.5 @ 300 ml every 6 hours with 400 ml H20 flush  Current TF & Flush Orders Provides: 1800 kcals, 99 gr prot, 2500 ml H20    Anthropometric Measures:  Height: 182.9 cm (6')  Ideal Body Weight (IBW): 178 lbs (81 kg)       Current Body Weight: 117.9 kg (260 lb),   IBW. Weight Source: Not Specified  Current BMI (kg/m2): 35.3        Weight Adjustment For: No Adjustment                 BMI Categories: 
Comprehensive Nutrition Assessment    Type and Reason for Visit:  Reassess    Nutrition Recommendations/Plan:   Monitor TF tolerance, weight and labs     Malnutrition Assessment:  Malnutrition Status:  No malnutrition (05/25/24 1850)    Context:  Chronic Illness     Findings of the 6 clinical characteristics of malnutrition:  Energy Intake:  No significant decrease in energy intake  Weight Loss:  No significant weight loss     Body Fat Loss:  Unable to assess     Muscle Mass Loss:  Unable to assess    Fluid Accumulation:  No significant fluid accumulation     Strength:  Not Performed    Nutrition Assessment:    Pt admitted w/ abdominal pain +C, pt w/ chronic PEG 2/2 dysphagia. Pt w/ previous poor appetite after CVA that resulted in PEG placement. Pt getting bolus feeds at home with protein supplement. RD to adjust regimen ordered to better meet needs and prevent over feeding. Will evaluate home reg. at f/u. Meds and labs reviewed, will follow per protocol. 05/29/24 Follow up: Continues on Bolus feeding, tolerates, no residuals, H20 flush icreased to 400 ml every 6 hours, TF meets >80% of caloric need. Review meds and labs. No new weight. 05/31/24 MD Consult: Met with patient and wife, wife had questions related to protein modular, Prosource, stated that she had been supplementing the patients TF at home with an OTC high protein shake, stated that it caused the constipation and was concerned that the Prosource would do the same. Discussed at length the diffences between the modular and the protein shake, also reinforced the need to monitor patients BMs to avoid constipation in the future. Wife was enged in the conversation and verbalized understanding the need for extra protein. Wife is requesting that Prosouce be prescribed to patient at discharge. RD stated that the MD would be made aware of the request. 06/05/24 Follow up: Continues on TF with Prosource, tolerates, no documented residuals.No new weight, meds 
OCCUPATIONAL THERAPY TREATMENT  Patient: Florentino Dalal (68 y.o. male)  Date: 6/3/2024  Primary Diagnosis: UTI (urinary tract infection) [N39.0]  Metabolic acidosis [E87.20]  SIRS (systemic inflammatory response syndrome) (HCC) [R65.10]  Acute cystitis without hematuria [N30.00]       Precautions: Modified Diet, Skin, Fall Risk, General Precautions, Bed Alarm                Recommendations for nursing mobility: Encourage HEP in prep for ADLs/mobility; see handout for details, Frequent repositioning to prevent skin breakdown, Use of bed/chair alarm for safety, and Assist x2    In place during session: Saunders Catheter  Chart, occupational therapy assessment, plan of care, and goals were reviewed.  ASSESSMENT  Patient continues with skilled OT services and is slowly progressing towards goals. Pt semi supine upon [facility] arrival, agreeable to session. Pt A&O x 2. Pt reported he needed to use the bedpan.  Pt was dependent for placement/removal and posterior hygiene. Pt was max A x1 to roll to his L side. (Bedpan x2 during session).  Pt completed grooming while sitting up in bed: brushing teeth and using a shampoo cap with max A.  Pt was able to comb hair with mod A.  Pt left semi supine in bed with spouse in the room, all needs met. (See below for objective details and assist levels).     Overall pt tolerated session fair today with grooming sitting up in bed. Potential barriers for safe discharge: pts current support system is unable to meet their requirements for physical assistance, pt has poor safety awareness, pt has impaired cognition, pt is a high fall risk, pt is not safe to be alone, and concern for pt safely navigating or managing the home environment. Current OT recommendations for discharge Skilled Nursing Facility. Will continue to benefit from skilled OT services, and will continue to progress as tolerated.       GOALS:    Problem: Occupational Therapy - Adult  Goal: By Discharge: Performs self-care 
Patient had blood sugar of 185mg/dl on his monitor read at 2100;   At Midnight it was 123 mg/dl and he was fed via PEG.  At 0400 it was 154mg/dl  
Patient with complaints KCL 10 MEQ IVPB burning. Call placed to MD to report above still awaiting call. IV decreased to 25 cc hr.   
Patient's blood sugar on his monitor is 124 mg/dl. He is due for a feed, but refused. Will recheck to feed in 1 hour. He is stable and sister in law in his company. Nil bowel movement for the night thus far  
Per Wives request wants to increase the amount of water given before and after feeding to 200 ml.  
Pt 0700  per pt freestyle reader.   
RBS at 2030 on patient's monitor was 99mg/dl  At 2220 it was 195mg/dl and due insulin Lantus of 34units  
Spiritual Care Assessment/Progress Note  [facility]    Name: Florentino Dalal MRN: [id number]    Age: 68 y.o.     Sex: male   Language: English     Date: 5/25/2024            Total Time Calculated: 34 min              Spiritual Assessment begun in [facility] SURGICAL  Service Provided For: Patient and family together  Referral/Consult From: Nurse  Encounter Overview/Reason: Initial Encounter    Spiritual beliefs:      [x] Involved in a kendal tradition/spiritual practice: [employer]ology     [x] Supported by a kendal community: [place] [employer]     [] Claims no spiritual orientation:      [] Seeking spiritual identity:           [] Adheres to an individual form of spirituality:      [] Not able to assess:                Identified resources for coping and support system:   Support System: Spouse, Children, Mandaen/kendal community       [x] Prayer                  [x] Devotional reading               [x] Music                  [] Guided Imagery     [] Pet visits                                        [] Other: (COMMENT)     Specific area/focus of visit   Encounter:    Crisis:    Spiritual/Emotional needs: Type: Spiritual Support  Ritual, Rites and Sacraments: Type: Blessings  Grief, Loss, and Adjustments: Type: Life Adjustments  Ethics/Mediation:    Behavioral Health:    Palliative Care:    Advance Care Planning:      Plan/Referrals: Other (Comment) ( is available upon request)    Narrative:  visited pt in response to spiritual consult. Pt/spouse are present. Pt shares he is not feeling well and falls asleep while his wife and the  stand bedside. Spouse shares pt's health narrative and her frustrations regarding the pt's treatments. She is hopeful that there will be a positive outcome in response to her concerns. She engages in life review/joyful storytelling. She reports that she and her  are people of strong kendal, prayer and Congregation attendance are regular practices. 
Spoken to POA wife Zhane, she said at home patient is on glucerna 1.5 4x/day 8oz then 8 oz of water for flushing, Protein Premier 1pkt 3x/day and at home patient could take thin fluids- puree orally.  
Writer re-inserted burnett and colllected urine specimen to be sent to lab.   
hi! This patient is with PEG, there is a dietitian consult but only comes at daytime tomorrow. Can I clarify if his NPO or not, the family said patient is on glucerna 1.5 4x/day via PEG at home as well as taking fluids orally. thanks! Need Callback: NO CALLBACK REQ 5 [facility]  Read 10:41 PM   5/24/24 10:41 PM  Resume home peg feeds    5/24/24 11:01 PM   So this patient has implanted Kamilah 3 to monitor his sugars. Pt. wife doesn't like for us doing sugarsticks. Can we use the patient own kamilah 3 for his sliding scale? Thanks!  Read 11:02 PM   5/24/24 11:02 PM  Yes  
              BMI Categories: Obese Class 2 (BMI 35.0 -39.9)    Estimated Daily Nutrient Needs:  Energy Requirements Based On: Kcal/kg  Weight Used for Energy Requirements: Ideal  Energy (kcal/day): ~2025 kcal (25 kcal/kg IBW)  Weight Used for Protein Requirements: Ideal  Protein (g/day): 97- 122g (1.2- 1.5g pro/kg IBW)  Method Used for Fluid Requirements: 1 ml/kcal  Fluid (ml/day): 1500ml adult min    Nutrition Diagnosis:   Inadequate oral intake related to swallowing difficulty as evidenced by nutrition support - enteral nutrition, NPO or clear liquid status due to medical condition    Nutrition Interventions:   Food and/or Nutrient Delivery: Continue NPO, Modify Tube Feeding  Nutrition Education/Counseling: No recommendation at this time  Coordination of Nutrition Care: Continue to monitor while inpatient       Goals:     Goals: Meet at least 75% of estimated needs, Tolerate nutrition support at goal rate, by next RD assessment       Nutrition Monitoring and Evaluation:   Behavioral-Environmental Outcomes: None Identified  Food/Nutrient Intake Outcomes: Enteral Nutrition Intake/Tolerance  Physical Signs/Symptoms Outcomes: Biochemical Data, GI Status, Weight    Discharge Planning:    Enteral Nutrition     Santa Cavanaugh RD  Contact: [phone]     
edema  SKIN: No rash  Neurologic:  Non-focal, residual left-sided weakness  Psychiatric:  Alert and oriented    /82   Pulse 89   Temp 98.2 °F (36.8 °C) (Oral)   Resp 18   Ht 1.829 m (6')   Wt 117.9 kg (260 lb)   SpO2 99%   BMI 35.26 kg/m²     Diet: ADULT TUBE FEEDING; PEG; Diabetic; Bolus; 4 Times Daily; 300; 400; Q 6 hours; Protein; 1 Dose; BID  DVT Prophylaxis: [x]PPx LMWH  []SQ Heparin  []IPC/SCDs  []Eliquis  []Xarelto  []Coumadin  []Other -      Code status: DNR  PT/OT Eval Status:   []NOT yet ordered  [x]Ordered and Pending   []Seen with Recommendations for:  []Home independently  []Home w/ assist  []HHC  []SNF  []Acute Rehab    Anticipated Discharge Day/Date: 24-48    Anticipated Discharge Location: []Home  []HHC  [x]SNF  []Acute Rehab  []ECF  []LTAC  []Hospice  []Other -      Consults:      IP CONSULT TO UROLOGY  IP CONSULT TO DIETITIAN  IP CONSULT TO SPIRITUAL SERVICES  IP WOUND CARE NURSE CONSULT TO EVAL        ------------------------------------------------------------------------------------------------------------------------------------------------------------------------    MDM      [] High (any 2)    A. Problems (any 1)  [] Acute/Chronic Illness/injury posing threat to life or bodily function:    [] Severe exacerbation of chronic illness:    ---------------------------------------------------------------------  B. Risk of Treatment (any 1)   [x] Drugs/treatments that require intensive monitoring for toxicity include:    [] Change in code status:    [] Decision to escalate care:    [] Major surgery/procedure with associated risk factors:    ----------------------------------------------------------------------  C. Data (any 2)  [] Discussed current management and discharge planning options with Case Management.  [] Discussed management of the case with:    [] Telemetry personally reviewed and interpreted as documented above    [] Imaging personally reviewed and interpreted, includes:  
(active and SROM)    Recommendation for discharge: (in order for the patient to meet his/her long term goals): Skilled Nursing Facility    IF patient discharges home will need the following DME: continuing to assess with progress     SUBJECTIVE:   Patient stated “I need to lay back down.”      OBJECTIVE DATA SUMMARY:   Cognitive/Behavioral Status:  Orientation  Overall Orientation Status: Impaired  Orientation Level: Oriented to time;Oriented to person;Disoriented to situation  Cognition  Following Commands: Follows one step commands with increased time;Follows one step commands with repetition  Attention Span: Attends with cues to redirect  Memory: Impaired  Safety Judgement: Impaired  Problem Solving: Assistance required to identify errors made;Assistance required to generate solutions;Assistance required to implement solutions;Assistance required to correct errors made;Decreased awareness of errors  Insights: Decreased awareness of deficits  Initiation: Requires cues for some  Sequencing: Requires cues for some    Functional Mobility and Transfers for ADLs:  Bed Mobility:  Bed Mobility Training  Rolling: Maximum assistance;Assist X2  Supine to Sit: Maximum assistance;Assist X2  Sit to Supine: Maximum assistance;Assist X2  Scooting: Maximum assistance;Assist X2;Additional time      Balance:  Balance  Sitting: Impaired  Sitting - Static: Poor (constant support)  Sitting - Dynamic: Poor (constant support)      ADL Intervention:        Toileting Skilled Clinical Factors: Max Ax2 bed level    Therapeutic exercise:  Completed in bed  Exercise Sets Reps AROM AAROM PROM Self PROM Comments   Shoulder flex/ext 1 10 [x] [] [] []    Elbow flex/ext   [] [] [] []    Wrist flex/ext   [] [] [] []       [] [] [] []      Activity Tolerance:   Fair  and requires rest breaks    After treatment patient left in no apparent distress:   Bed locked and returned to lowest position, Patient left in no apparent distress in bed, Call bell 
is no distension.      Palpations: Abdomen is soft. There is no mass.      Tenderness: There is no abdominal tenderness. There is no guarding or rebound.   Genitourinary:     Comments: Saunders catheter in place draining clear yellow urine.  Mid penile shaft hypospadias  Musculoskeletal:         General: No deformity or signs of injury.      Cervical back: No rigidity.   Skin:     General: Skin is warm and dry.          Vitals:    05/26/24 0842   BP: (!) 163/89   Pulse: 100   Resp:    Temp:    SpO2:          Data Review:       Recent Days:  Sodium   Date/Time Value Ref Range Status   05/26/2024 10:11  (H) 136 - 145 mmol/L Final     Potassium   Date/Time Value Ref Range Status   05/26/2024 10:11 AM 3.4 (L) 3.5 - 5.1 mmol/L Final     Chloride   Date/Time Value Ref Range Status   05/26/2024 10:11  (H) 97 - 108 mmol/L Final     CO2   Date/Time Value Ref Range Status   05/26/2024 10:11 AM 24 21 - 32 mmol/L Final     Hemoglobin   Date/Time Value Ref Range Status   05/26/2024 10:11 AM 12.1 12.1 - 17.0 g/dL Final     Hematocrit   Date/Time Value Ref Range Status   05/26/2024 10:11 AM 37.2 36.6 - 50.3 % Final     Platelets   Date/Time Value Ref Range Status   05/26/2024 10:11 AM 73 (L) 150 - 400 K/uL Final      BUN   Date/Time Value Ref Range Status   05/26/2024 10:11 AM 31 (H) 6 - 20 mg/dL Final     Creatinine   Date/Time Value Ref Range Status   05/26/2024 10:11 AM 1.01 0.70 - 1.30 mg/dL Final             Assessment/     Patient Active Problem List   Diagnosis    Altered mental status    Pneumonia    UTI (urinary tract infection)     Urinary tract infection - urine culture resulted with greater than 2 organisms.  Likely secondary to a contaminated collection.  Patient will need empiric treatment of his urinary tract infection.    Urinary retention - likely secondary to neurogenic bladder and BPH.  I discussed this with the patient's wife in great detail and recommended that the patient pursue a chronic indwelling 
reviewed and interpreted for clinical significance   [x] Appropriate follow-up labs were ordered    Medications:  Personally reviewed in detail in conjunction w/ labs as documented for evidence of drug toxicity.     Infusion Medications    dextrose      sodium chloride       Scheduled Medications    dorzolamide  1 drop Both Eyes Daily    And    timolol  1 drop Both Eyes Daily    insulin glargine  34 Units SubCUTAneous Nightly    aspirin  81 mg PEG Tube Daily    latanoprost  1 drop Both Eyes Nightly    lisinopril  10 mg Oral Daily    sodium chloride flush  5-40 mL IntraVENous 2 times per day    finasteride  5 mg PEG Tube Daily    insulin lispro  0-8 Units SubCUTAneous TID WC    insulin lispro  0-4 Units SubCUTAneous Nightly    metoprolol tartrate  25 mg Oral BID     PRN Meds: traMADol, glucose, dextrose bolus **OR** dextrose bolus, glucagon (rDNA), dextrose, bisacodyl, hydrOXYzine HCl, ibuprofen, melatonin, sodium chloride flush, sodium chloride, magnesium sulfate, acetaminophen **OR** acetaminophen, ondansetron **OR** ondansetron, sennosides-docusate sodium     Labs:  Personally reviewed and interpreted for clinical significance.     Recent Labs     05/31/24 0930 06/01/24  0735   WBC 4.3 4.3   HGB 11.0* 11.2*   HCT 33.1* 33.7*   PLT 63* 60*     Recent Labs     05/31/24 0930 06/01/24  0735    138   K 3.7 3.6   * 108   CO2 24 24   BUN 20 19   CREATININE 0.78 0.68*   CALCIUM 8.6 8.8   MG 2.1 2.1     No results for input(s): \"PROBNP\", \"TROPHS\" in the last 72 hours.  No results for input(s): \"LABA1C\" in the last 72 hours.  Recent Labs     05/31/24 0930 06/01/24  0735   AST 39* 38*   ALT 58 58   BILITOT 0.8 0.8   ALKPHOS 176* 185*     No results for input(s): \"INR\", \"LACTA\", \"TSH\" in the last 72 hours.    Urine Cultures: No results found for: \"LABURIN\"  Blood Cultures: No results found for: \"BC\"  No results found for: \"BLOODCULT2\"  Organism: No results found for: \"ORG\"      Debi Mercer MD  IP CONSULT TO 
(H) 65 - 100 mg/dL    Performed by: TAJ AGUDELO    Basic Metabolic Panel w/ Reflex to MG    Collection Time: 05/25/24  5:32 AM   Result Value Ref Range    Sodium 137 136 - 145 mmol/L    Potassium 3.8 3.5 - 5.1 mmol/L    Chloride 111 (H) 97 - 108 mmol/L    CO2 18 (L) 21 - 32 mmol/L    Anion Gap 8 5 - 15 mmol/L    Glucose 318 (H) 65 - 100 mg/dL    BUN 31 (H) 6 - 20 mg/dL    Creatinine 1.16 0.70 - 1.30 mg/dL    BUN/Creatinine Ratio 27 (H) 12 - 20      Est, Glom Filt Rate 68 >60 ml/min/1.73m2    Calcium 9.1 8.5 - 10.1 mg/dL   CBC with Auto Differential    Collection Time: 05/25/24  5:32 AM   Result Value Ref Range    WBC 13.0 (H) 4.1 - 11.1 K/uL    RBC 3.94 (L) 4.10 - 5.70 M/uL    Hemoglobin 12.6 12.1 - 17.0 g/dL    Hematocrit 36.2 (L) 36.6 - 50.3 %    MCV 91.9 80.0 - 99.0 FL    MCH 32.0 26.0 - 34.0 PG    MCHC 34.8 30.0 - 36.5 g/dL    RDW 14.6 (H) 11.5 - 14.5 %    Platelets 110 (L) 150 - 400 K/uL    MPV 10.8 8.9 - 12.9 FL    Nucleated RBCs 0.0 0.0  WBC    nRBC 0.00 0.00 - 0.01 K/uL    Neutrophils % 83 (H) 32 - 75 %    Lymphocytes % 9 (L) 12 - 49 %    Monocytes % 7 5 - 13 %    Eosinophils % 0 0 - 7 %    Basophils % 0 0 - 1 %    Immature Granulocytes % 1 (H) 0 - 0.5 %    Neutrophils Absolute 10.8 (H) 1.8 - 8.0 K/UL    Lymphocytes Absolute 1.2 0.8 - 3.5 K/UL    Monocytes Absolute 0.9 0.0 - 1.0 K/UL    Eosinophils Absolute 0.0 0.0 - 0.4 K/UL    Basophils Absolute 0.1 0.0 - 0.1 K/UL    Immature Granulocytes Absolute 0.1 (H) 0.00 - 0.04 K/UL    Differential Type AUTOMATED         CTA HEAD NECK W CONTRAST   Final Result   1. 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 No prior studies are   available to assess for evolution. There is no intraluminal thrombus.   2. Other than the right internal carotid artery, there is no acute intracranial   vascular abnormality.   3. Large right MCA territory late subacute to chronic infarct.    
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    --Review of CTA 4/9 from [facility]   Redemonstrated dissection flap extending from the right cervical internal carotid artery into the petrous segment of the intracranial internal carotid artery with contrast opacifying the false lumen, grossly unchanged from prior.     Plan:  Continue supportive care  Monitor urine output  Continue fleets enema  Clinical updates provided to wife at bedside      Code status: Full code    Social determinants of health: none      Estimated discharge date//time frame/disposition:    Barriers to discharge: Clinical improvement    Anticipated discharge to skilled care facility next Tuesday    Total time spent: 40 minutes          Jose Frye MD

## 2024-06-07 LAB
BACTERIA SPEC CULT: ABNORMAL
COLONY COUNT, CNT: ABNORMAL
COLONY COUNT, CNT: ABNORMAL
Lab: ABNORMAL